# Patient Record
Sex: MALE | Race: WHITE | NOT HISPANIC OR LATINO | Employment: OTHER | URBAN - METROPOLITAN AREA
[De-identification: names, ages, dates, MRNs, and addresses within clinical notes are randomized per-mention and may not be internally consistent; named-entity substitution may affect disease eponyms.]

---

## 2022-07-24 PROBLEM — U07.1 COVID-19: Status: ACTIVE | Noted: 2022-07-24

## 2022-08-09 PROBLEM — C85.90 RECURRENT LYMPHOMA (HCC): Status: ACTIVE | Noted: 2022-08-09

## 2022-08-20 PROBLEM — I35.0 MILD AORTIC STENOSIS: Status: ACTIVE | Noted: 2017-07-26

## 2022-08-20 PROBLEM — D36.9 ADENOMATOUS POLYP: Status: ACTIVE | Noted: 2017-09-26

## 2022-08-20 PROBLEM — K58.0 IRRITABLE BOWEL SYNDROME WITH DIARRHEA: Status: ACTIVE | Noted: 2017-06-16

## 2022-08-20 PROBLEM — C82.00 FOLLICULAR LYMPHOMA GRADE I (HCC): Status: ACTIVE | Noted: 2022-06-08

## 2022-08-20 PROBLEM — C82.90 FOLLICULAR LYMPHOMA (HCC): Status: ACTIVE | Noted: 2018-12-14

## 2022-09-13 PROBLEM — N17.9 AKI (ACUTE KIDNEY INJURY) (HCC): Status: ACTIVE | Noted: 2022-09-13

## 2022-09-13 PROBLEM — J18.9 PNEUMONIA: Status: ACTIVE | Noted: 2022-09-13

## 2022-09-13 PROBLEM — R53.1 WEAKNESS: Status: ACTIVE | Noted: 2022-09-13

## 2022-09-15 PROBLEM — N17.9 AKI (ACUTE KIDNEY INJURY) (HCC): Status: RESOLVED | Noted: 2022-09-13 | Resolved: 2022-09-15

## 2022-09-15 PROBLEM — U07.1 LAB TEST POSITIVE FOR DETECTION OF COVID-19 VIRUS: Status: RESOLVED | Noted: 2022-07-24 | Resolved: 2022-09-15

## 2022-09-15 PROBLEM — J18.9 PNEUMONIA: Status: RESOLVED | Noted: 2022-09-13 | Resolved: 2022-09-15

## 2022-09-15 PROBLEM — R53.1 WEAKNESS: Status: RESOLVED | Noted: 2022-09-13 | Resolved: 2022-09-15

## 2022-09-21 ENCOUNTER — HOSPITAL ENCOUNTER (INPATIENT)
Facility: MEDICAL CENTER | Age: 72
LOS: 21 days | DRG: 177 | End: 2022-10-12
Attending: HOSPITALIST | Admitting: HOSPITALIST
Payer: MEDICARE

## 2022-09-21 ENCOUNTER — APPOINTMENT (OUTPATIENT)
Dept: RADIOLOGY | Facility: MEDICAL CENTER | Age: 72
DRG: 177 | End: 2022-09-21
Attending: STUDENT IN AN ORGANIZED HEALTH CARE EDUCATION/TRAINING PROGRAM
Payer: MEDICARE

## 2022-09-21 DIAGNOSIS — I47.10 SVT (SUPRAVENTRICULAR TACHYCARDIA) (HCC): ICD-10-CM

## 2022-09-21 DIAGNOSIS — U07.1 COVID: ICD-10-CM

## 2022-09-21 DIAGNOSIS — I35.0 AORTIC VALVE STENOSIS, ETIOLOGY OF CARDIAC VALVE DISEASE UNSPECIFIED: ICD-10-CM

## 2022-09-21 DIAGNOSIS — J96.01 ACUTE RESPIRATORY FAILURE WITH HYPOXIA (HCC): ICD-10-CM

## 2022-09-21 DIAGNOSIS — I50.20 HEART FAILURE WITH REDUCED EJECTION FRACTION (HCC): ICD-10-CM

## 2022-09-21 PROBLEM — D61.818 PANCYTOPENIA (HCC): Status: ACTIVE | Noted: 2022-09-21

## 2022-09-21 PROBLEM — R74.8 ELEVATED LIVER ENZYMES: Status: ACTIVE | Noted: 2022-09-21

## 2022-09-21 PROBLEM — I21.4 NSTEMI (NON-ST ELEVATED MYOCARDIAL INFARCTION) (HCC): Status: ACTIVE | Noted: 2022-09-21

## 2022-09-21 PROBLEM — N18.9 CKD (CHRONIC KIDNEY DISEASE): Status: ACTIVE | Noted: 2022-09-21

## 2022-09-21 PROBLEM — E87.8 LOW BICARBONATE: Status: ACTIVE | Noted: 2022-09-21

## 2022-09-21 PROBLEM — Z71.89 ADVANCE CARE PLANNING: Status: ACTIVE | Noted: 2022-09-21

## 2022-09-21 PROBLEM — E87.6 HYPOKALEMIA: Status: ACTIVE | Noted: 2022-09-21

## 2022-09-21 PROBLEM — R79.89 ELEVATED BRAIN NATRIURETIC PEPTIDE (BNP) LEVEL: Status: ACTIVE | Noted: 2022-09-21

## 2022-09-21 LAB
APTT PPP: 34.4 SEC (ref 24.7–36)
EKG IMPRESSION: NORMAL
INR PPP: 1.43 (ref 0.87–1.13)
LACTATE SERPL-SCNC: 1.2 MMOL/L (ref 0.5–2)
LACTATE SERPL-SCNC: 1.6 MMOL/L (ref 0.5–2)
PROCALCITONIN SERPL-MCNC: 0.43 NG/ML
PROTHROMBIN TIME: 17.2 SEC (ref 12–14.6)
TROPONIN T SERPL-MCNC: 579 NG/L (ref 6–19)
TROPONIN T SERPL-MCNC: 667 NG/L (ref 6–19)
UFH PPP CHRO-ACNC: <0.1 IU/ML

## 2022-09-21 PROCEDURE — 8E0ZXY6 ISOLATION: ICD-10-PCS | Performed by: STUDENT IN AN ORGANIZED HEALTH CARE EDUCATION/TRAINING PROGRAM

## 2022-09-21 PROCEDURE — 85730 THROMBOPLASTIN TIME PARTIAL: CPT

## 2022-09-21 PROCEDURE — 80074 ACUTE HEPATITIS PANEL: CPT

## 2022-09-21 PROCEDURE — 84484 ASSAY OF TROPONIN QUANT: CPT | Mod: 91

## 2022-09-21 PROCEDURE — 700102 HCHG RX REV CODE 250 W/ 637 OVERRIDE(OP): Performed by: STUDENT IN AN ORGANIZED HEALTH CARE EDUCATION/TRAINING PROGRAM

## 2022-09-21 PROCEDURE — 84145 PROCALCITONIN (PCT): CPT

## 2022-09-21 PROCEDURE — 93010 ELECTROCARDIOGRAM REPORT: CPT | Performed by: INTERNAL MEDICINE

## 2022-09-21 PROCEDURE — 83605 ASSAY OF LACTIC ACID: CPT | Mod: 91

## 2022-09-21 PROCEDURE — 99223 1ST HOSP IP/OBS HIGH 75: CPT | Mod: AI | Performed by: STUDENT IN AN ORGANIZED HEALTH CARE EDUCATION/TRAINING PROGRAM

## 2022-09-21 PROCEDURE — 85610 PROTHROMBIN TIME: CPT

## 2022-09-21 PROCEDURE — 93005 ELECTROCARDIOGRAM TRACING: CPT | Performed by: STUDENT IN AN ORGANIZED HEALTH CARE EDUCATION/TRAINING PROGRAM

## 2022-09-21 PROCEDURE — 770020 HCHG ROOM/CARE - TELE (206)

## 2022-09-21 PROCEDURE — A9270 NON-COVERED ITEM OR SERVICE: HCPCS | Performed by: STUDENT IN AN ORGANIZED HEALTH CARE EDUCATION/TRAINING PROGRAM

## 2022-09-21 PROCEDURE — 99223 1ST HOSP IP/OBS HIGH 75: CPT | Performed by: INTERNAL MEDICINE

## 2022-09-21 PROCEDURE — 85520 HEPARIN ASSAY: CPT

## 2022-09-21 RX ORDER — HEPARIN SODIUM 5000 [USP'U]/100ML
0-30 INJECTION, SOLUTION INTRAVENOUS CONTINUOUS
Status: DISCONTINUED | OUTPATIENT
Start: 2022-09-21 | End: 2022-09-21

## 2022-09-21 RX ORDER — ACETAMINOPHEN 325 MG/1
325 TABLET ORAL
COMMUNITY

## 2022-09-21 RX ORDER — ASPIRIN 300 MG/1
300 SUPPOSITORY RECTAL DAILY
Status: DISCONTINUED | OUTPATIENT
Start: 2022-09-22 | End: 2022-09-22

## 2022-09-21 RX ORDER — HEPARIN SODIUM 1000 [USP'U]/ML
80 INJECTION, SOLUTION INTRAVENOUS; SUBCUTANEOUS ONCE
Status: DISCONTINUED | OUTPATIENT
Start: 2022-09-21 | End: 2022-09-21

## 2022-09-21 RX ORDER — DEXAMETHASONE 6 MG/1
6 TABLET ORAL DAILY
Status: COMPLETED | OUTPATIENT
Start: 2022-09-22 | End: 2022-10-01

## 2022-09-21 RX ORDER — SODIUM BICARBONATE 650 MG/1
650 TABLET ORAL 2 TIMES DAILY
Status: COMPLETED | OUTPATIENT
Start: 2022-09-21 | End: 2022-09-23

## 2022-09-21 RX ORDER — HEPARIN SODIUM 1000 [USP'U]/ML
2000 INJECTION, SOLUTION INTRAVENOUS; SUBCUTANEOUS PRN
Status: DISCONTINUED | OUTPATIENT
Start: 2022-09-21 | End: 2022-09-21

## 2022-09-21 RX ORDER — HEPARIN SODIUM 1000 [USP'U]/ML
4000 INJECTION, SOLUTION INTRAVENOUS; SUBCUTANEOUS ONCE
Status: DISCONTINUED | OUTPATIENT
Start: 2022-09-21 | End: 2022-09-21

## 2022-09-21 RX ORDER — ASPIRIN 325 MG
325 TABLET ORAL DAILY
Status: DISCONTINUED | OUTPATIENT
Start: 2022-09-22 | End: 2022-09-22

## 2022-09-21 RX ORDER — DOXYCYCLINE 100 MG/1
100 TABLET ORAL EVERY 12 HOURS
Status: COMPLETED | OUTPATIENT
Start: 2022-09-21 | End: 2022-09-28

## 2022-09-21 RX ORDER — ASPIRIN 81 MG/1
324 TABLET, CHEWABLE ORAL DAILY
Status: DISCONTINUED | OUTPATIENT
Start: 2022-09-22 | End: 2022-09-22

## 2022-09-21 RX ORDER — HEPARIN SODIUM 1000 [USP'U]/ML
40 INJECTION, SOLUTION INTRAVENOUS; SUBCUTANEOUS PRN
Status: DISCONTINUED | OUTPATIENT
Start: 2022-09-21 | End: 2022-09-21

## 2022-09-21 RX ADMIN — DOXYCYCLINE 100 MG: 100 TABLET, FILM COATED ORAL at 18:39

## 2022-09-21 RX ADMIN — SODIUM BICARBONATE 650 MG: 650 TABLET ORAL at 18:41

## 2022-09-21 ASSESSMENT — COGNITIVE AND FUNCTIONAL STATUS - GENERAL
SUGGESTED CMS G CODE MODIFIER DAILY ACTIVITY: CH
DAILY ACTIVITIY SCORE: 24
SUGGESTED CMS G CODE MODIFIER MOBILITY: CH
MOBILITY SCORE: 24

## 2022-09-21 ASSESSMENT — FIBROSIS 4 INDEX: FIB4 SCORE: 18.77

## 2022-09-21 ASSESSMENT — ENCOUNTER SYMPTOMS
VOMITING: 0
NAUSEA: 0
COUGH: 1
BLURRED VISION: 0
SHORTNESS OF BREATH: 0
DIZZINESS: 0
MYALGIAS: 0
FEVER: 0
BRUISES/BLEEDS EASILY: 0

## 2022-09-21 ASSESSMENT — LIFESTYLE VARIABLES
HOW MANY TIMES IN THE PAST YEAR HAVE YOU HAD 5 OR MORE DRINKS IN A DAY: 0
HAVE PEOPLE ANNOYED YOU BY CRITICIZING YOUR DRINKING: NO
TOTAL SCORE: 0
EVER FELT BAD OR GUILTY ABOUT YOUR DRINKING: NO
TOTAL SCORE: 0
ON A TYPICAL DAY WHEN YOU DRINK ALCOHOL HOW MANY DRINKS DO YOU HAVE: 0
HAVE YOU EVER FELT YOU SHOULD CUT DOWN ON YOUR DRINKING: NO
TOTAL SCORE: 0
DOES PATIENT WANT TO STOP DRINKING: NO
AVERAGE NUMBER OF DAYS PER WEEK YOU HAVE A DRINK CONTAINING ALCOHOL: 0
EVER HAD A DRINK FIRST THING IN THE MORNING TO STEADY YOUR NERVES TO GET RID OF A HANGOVER: NO
ALCOHOL_USE: NO
CONSUMPTION TOTAL: NEGATIVE

## 2022-09-21 ASSESSMENT — PATIENT HEALTH QUESTIONNAIRE - PHQ9
1. LITTLE INTEREST OR PLEASURE IN DOING THINGS: NOT AT ALL
2. FEELING DOWN, DEPRESSED, IRRITABLE, OR HOPELESS: NOT AT ALL
SUM OF ALL RESPONSES TO PHQ9 QUESTIONS 1 AND 2: 0

## 2022-09-21 NOTE — PROGRESS NOTES
[facility] TRIAGE OFFICER DIRECT ADMISSION REPORT  Transferring facility: [place] [facility]    Chief complaint: weakness, anorexia. NO chest pain  Pertinent history & patient course: covid 19 dx one week ago  Pertinent imaging & lab results: elevated troponin in setting of covid 19    New t wave abnormalities inferior leads    Patient accepted for transfer: Yes    Admission status: Inpatient.   Floor requested: tele      Please inform the triage officer upon arrival of the patient to [facility] for assignment of a hospitalist to perform admission.     For any question or concerns regarding the care of this patient, please reach out to the assigned hospitalist.

## 2022-09-22 ENCOUNTER — APPOINTMENT (OUTPATIENT)
Dept: CARDIOLOGY | Facility: MEDICAL CENTER | Age: 72
DRG: 177 | End: 2022-09-22
Attending: PHYSICIAN ASSISTANT
Payer: MEDICARE

## 2022-09-22 ENCOUNTER — APPOINTMENT (OUTPATIENT)
Dept: RADIOLOGY | Facility: MEDICAL CENTER | Age: 72
DRG: 177 | End: 2022-09-22
Attending: STUDENT IN AN ORGANIZED HEALTH CARE EDUCATION/TRAINING PROGRAM
Payer: MEDICARE

## 2022-09-22 LAB
ANION GAP SERPL CALC-SCNC: 16 MMOL/L (ref 7–16)
BUN SERPL-MCNC: 43 MG/DL (ref 8–22)
CALCIUM SERPL-MCNC: 7.6 MG/DL (ref 8.5–10.5)
CHLORIDE SERPL-SCNC: 103 MMOL/L (ref 96–112)
CO2 SERPL-SCNC: 15 MMOL/L (ref 20–33)
CREAT SERPL-MCNC: 2.1 MG/DL (ref 0.5–1.4)
EKG IMPRESSION: NORMAL
ERYTHROCYTE [DISTWIDTH] IN BLOOD BY AUTOMATED COUNT: 47.1 FL (ref 35.9–50)
GFR SERPLBLD CREATININE-BSD FMLA CKD-EPI: 33 ML/MIN/1.73 M 2
GLUCOSE SERPL-MCNC: 166 MG/DL (ref 65–99)
HAV IGM SERPL QL IA: NORMAL
HBV CORE IGM SER QL: NORMAL
HBV SURFACE AG SER QL: NORMAL
HCT VFR BLD AUTO: 26.4 % (ref 42–52)
HCV AB SER QL: NORMAL
HGB BLD-MCNC: 9.5 G/DL (ref 14–18)
LACTATE SERPL-SCNC: 1.1 MMOL/L (ref 0.5–2)
LV EJECT FRACT  99904: 20
MCH RBC QN AUTO: 32.1 PG (ref 27–33)
MCHC RBC AUTO-ENTMCNC: 36 G/DL (ref 33.7–35.3)
MCV RBC AUTO: 89.2 FL (ref 81.4–97.8)
PLATELET # BLD AUTO: 93 K/UL (ref 164–446)
PMV BLD AUTO: 11.5 FL (ref 9–12.9)
POTASSIUM SERPL-SCNC: 3.7 MMOL/L (ref 3.6–5.5)
RBC # BLD AUTO: 2.96 M/UL (ref 4.7–6.1)
SODIUM SERPL-SCNC: 134 MMOL/L (ref 135–145)
WBC # BLD AUTO: 2.1 K/UL (ref 4.8–10.8)

## 2022-09-22 PROCEDURE — 700102 HCHG RX REV CODE 250 W/ 637 OVERRIDE(OP): Performed by: STUDENT IN AN ORGANIZED HEALTH CARE EDUCATION/TRAINING PROGRAM

## 2022-09-22 PROCEDURE — 770020 HCHG ROOM/CARE - TELE (206)

## 2022-09-22 PROCEDURE — 87040 BLOOD CULTURE FOR BACTERIA: CPT

## 2022-09-22 PROCEDURE — 36415 COLL VENOUS BLD VENIPUNCTURE: CPT

## 2022-09-22 PROCEDURE — 700102 HCHG RX REV CODE 250 W/ 637 OVERRIDE(OP): Performed by: INTERNAL MEDICINE

## 2022-09-22 PROCEDURE — 700111 HCHG RX REV CODE 636 W/ 250 OVERRIDE (IP): Performed by: INTERNAL MEDICINE

## 2022-09-22 PROCEDURE — 93306 TTE W/DOPPLER COMPLETE: CPT

## 2022-09-22 PROCEDURE — 93306 TTE W/DOPPLER COMPLETE: CPT | Mod: 26 | Performed by: INTERNAL MEDICINE

## 2022-09-22 PROCEDURE — A9270 NON-COVERED ITEM OR SERVICE: HCPCS | Performed by: INTERNAL MEDICINE

## 2022-09-22 PROCEDURE — 99232 SBSQ HOSP IP/OBS MODERATE 35: CPT | Performed by: INTERNAL MEDICINE

## 2022-09-22 PROCEDURE — 83605 ASSAY OF LACTIC ACID: CPT

## 2022-09-22 PROCEDURE — 85027 COMPLETE CBC AUTOMATED: CPT

## 2022-09-22 PROCEDURE — 76705 ECHO EXAM OF ABDOMEN: CPT

## 2022-09-22 PROCEDURE — 93010 ELECTROCARDIOGRAM REPORT: CPT | Performed by: INTERNAL MEDICINE

## 2022-09-22 PROCEDURE — 80048 BASIC METABOLIC PNL TOTAL CA: CPT

## 2022-09-22 PROCEDURE — 71045 X-RAY EXAM CHEST 1 VIEW: CPT

## 2022-09-22 PROCEDURE — A9270 NON-COVERED ITEM OR SERVICE: HCPCS | Performed by: STUDENT IN AN ORGANIZED HEALTH CARE EDUCATION/TRAINING PROGRAM

## 2022-09-22 RX ORDER — HEPARIN SODIUM 5000 [USP'U]/ML
5000 INJECTION, SOLUTION INTRAVENOUS; SUBCUTANEOUS EVERY 8 HOURS
Status: DISCONTINUED | OUTPATIENT
Start: 2022-09-22 | End: 2022-10-02

## 2022-09-22 RX ORDER — FUROSEMIDE 10 MG/ML
40 INJECTION INTRAMUSCULAR; INTRAVENOUS
Status: DISCONTINUED | OUTPATIENT
Start: 2022-09-22 | End: 2022-09-23

## 2022-09-22 RX ORDER — POTASSIUM CHLORIDE 20 MEQ/1
40 TABLET, EXTENDED RELEASE ORAL DAILY
Status: DISCONTINUED | OUTPATIENT
Start: 2022-09-23 | End: 2022-09-22

## 2022-09-22 RX ORDER — POTASSIUM CHLORIDE 20 MEQ/1
40 TABLET, EXTENDED RELEASE ORAL DAILY
Status: COMPLETED | OUTPATIENT
Start: 2022-09-22 | End: 2022-09-23

## 2022-09-22 RX ORDER — ENOXAPARIN SODIUM 100 MG/ML
30 INJECTION SUBCUTANEOUS DAILY
Status: DISCONTINUED | OUTPATIENT
Start: 2022-09-22 | End: 2022-09-22

## 2022-09-22 RX ADMIN — DOXYCYCLINE 100 MG: 100 TABLET, FILM COATED ORAL at 18:36

## 2022-09-22 RX ADMIN — FUROSEMIDE 40 MG: 10 INJECTION, SOLUTION INTRAMUSCULAR; INTRAVENOUS at 13:51

## 2022-09-22 RX ADMIN — DEXAMETHASONE 6 MG: 4 TABLET ORAL at 05:06

## 2022-09-22 RX ADMIN — HEPARIN SODIUM 5000 UNITS: 5000 INJECTION INTRAVENOUS; SUBCUTANEOUS at 13:51

## 2022-09-22 RX ADMIN — DOXYCYCLINE 100 MG: 100 TABLET, FILM COATED ORAL at 05:05

## 2022-09-22 RX ADMIN — SODIUM BICARBONATE 650 MG: 650 TABLET ORAL at 05:05

## 2022-09-22 RX ADMIN — POTASSIUM CHLORIDE 40 MEQ: 1500 TABLET, EXTENDED RELEASE ORAL at 13:51

## 2022-09-22 RX ADMIN — ASPIRIN 81 MG 324 MG: 81 TABLET ORAL at 05:05

## 2022-09-22 RX ADMIN — HEPARIN SODIUM 5000 UNITS: 5000 INJECTION INTRAVENOUS; SUBCUTANEOUS at 22:38

## 2022-09-22 RX ADMIN — SODIUM BICARBONATE 650 MG: 650 TABLET ORAL at 18:36

## 2022-09-22 ASSESSMENT — FIBROSIS 4 INDEX: FIB4 SCORE: 20.18

## 2022-09-22 NOTE — ASSESSMENT & PLAN NOTE
Echo showing ef of 20% probable severer Aortic stenosis  Cardiology evaluated him and recommended to reconsult for possible stress echo when he is more stable.  No symptoms of acute chest pain.

## 2022-09-22 NOTE — ASSESSMENT & PLAN NOTE
Renal function has been fluctuating.  Increased BUN and creatinine this morning.  Continue to monitor  Renally dose medications as appropriate

## 2022-09-22 NOTE — PROGRESS NOTES
Interval History:  Feels better than yesterday.  Shortness of breath is improving.  No chest pain.    Physical Exam   Blood pressure (!) 99/53, pulse 75, temperature 36.2 °C (97.1 °F), temperature source Temporal, resp. rate 18, weight 71 kg (156 lb 8.4 oz), SpO2 95 %.    Constitutional:  Appears well-developed.   HENT: Normocephalic and atraumatic. No scleral icterus.   Neck: No JVD present.   Cardiovascular: 2 x 6 systolic murmur aortic area   pulmonary/Chest: Basal crackles  Abdominal: S/NT/ND BS+   Musculoskeletal: Exhibits no edema. Pulses present.  Skin: Skin is warm and dry.     ROS: As HPI other reviewed and negative       Intake/Output Summary (Last 24 hours) at 9/22/2022 1334  Last data filed at 9/22/2022 0950  Gross per 24 hour   Intake 120 ml   Output --   Net 120 ml       Recent Labs     09/21/22  1130   WBC 2.5*   RBC 3.15*   HEMOGLOBIN 10.0*   HEMATOCRIT 28.6*   MCV 90.8   MCH 31.7   MCHC 35.0   RDW 14.6*   PLATELETCT 100*   MPV 10.6*     Recent Labs     09/21/22  1130   SODIUM 131*   POTASSIUM 3.3*   CHLORIDE 105   CO2 16*   GLUCOSE 94   BUN 32*   CREATININE 2.2*   CALCIUM 7.4*     Recent Labs     09/21/22  1830   APTT 34.4   INR 1.43*     Recent Labs     09/21/22  1130   BNPBTYPENAT 33067*     Recent Labs     09/21/22  1130 09/21/22  1304   TROPONINI 6.68* 5.49*   BNPBTYPENAT 95708*  --            No current facility-administered medications on file prior to encounter.     Current Outpatient Medications on File Prior to Encounter   Medication Sig Dispense Refill    acetaminophen (TYLENOL) 325 MG Tab Take 325 mg by mouth one time as needed.      Cyanocobalamin 2000 MCG Tab Take 1 Tablet by mouth every day.      acyclovir (ZOVIRAX) 400 MG tablet Take 400 mg by mouth every day.      allopurinol (ZYLOPRIM) 100 MG Tab Take 100 mg by mouth every day.      aspirin 81 MG EC tablet Take 81 mg by mouth every day.      vitamin D (VITAMIND D3) 1000 UNIT Tab Take 2,000 Units by mouth every day.      rosuvastatin  (CRESTOR) 20 MG Tab Take 20 mg by mouth every day.         Current Facility-Administered Medications   Medication Dose Frequency Provider Last Rate Last Admin    furosemide (LASIX) injection 40 mg  40 mg BID DIURETIC Vic Escobar M.D.        potassium chloride SA (Kdur) tablet 40 mEq  40 mEq DAILY Jennifer Villarreal M.D.        [START ON 9/23/2022] aspirin EC (ECOTRIN) tablet 81 mg  81 mg DAILY Jennifer Villarreal M.D.        heparin injection 5,000 Units  5,000 Units Q8HRS Jennifer Villarreal M.D.        sodium bicarbonate tablet 650 mg  650 mg BID Judah Chen M.D.   650 mg at 09/22/22 0505    dexamethasone (DECADRON) tablet 6 mg  6 mg DAILY Judah Chen M.D.   6 mg at 09/22/22 0506    doxycycline monohydrate (ADOXA) tablet 100 mg  100 mg Q12HRS Judah Chen M.D.   100 mg at 09/22/22 0505   Last reviewed on 9/21/2022  5:36 PM by Elle Bah   Medications reviewed    Imaging reviewed    Chest x-ray multifocal pneumonia versus pulmonary edema    Impressions:  72-year-old male patient with COVID pneumonia,?  Myocarditis with congestive heart failure    Recommendations:  Awaiting echocardiogram.   Not sure how much is heart failure versus multifocal pneumonia due to COVID.  Trial of diuretics today.  We will continue to follow.    Discussed with primary physician and bedside nursing.    Do not hesitate to call me with questions regarding the patient care.    Vic Escobar  Interventional cardiologist  Cell: [phone]

## 2022-09-22 NOTE — ASSESSMENT & PLAN NOTE
Likely in setting of COVID-19 infection  Euvolemic on exam  Echo showed reduced ejection fraction.

## 2022-09-22 NOTE — DIETARY
Nutrition services: Day 1 of admit.  Ceferino Fitch is a 72 y.o. male with admitting DX of elevated troponin, NSTEMI.    Pt seen for malnutrition screening tool score of 3, pt noted to have unsure wt loss with poor PO intake. RD unable to see pt at bedside due to COVID 19 isolation precautions. RD attempted to reach pt by bedside hospital phone and pt's cell phone, unable to reach pt at this time.       Assessment:  Weight: 71 kg (156 lb 8.4 oz)- per bedscale on 9/21.   Body mass index is 21.83 kg/m²., BMI classification: normal.   Diet/Intake: Cardiac, No decaf, no caffeine. Per ADL, pt ate ~ 25-50% of Breakfast this am.     Evaluation:   Pertinent Labs: Na+ 131, K+ 3.3, BUN 32, Creat 2.2, / .   Pertinent Meds: Decadron, Lasix, Kdur, Sodium Bicarbonate.   No pressure ulcers noted.   Per chart, pt with COVID, CKD and hx of follicular lymphoma grade I.   Per MD notes, there is question pt may have heart failure, getting diuresis. Wt trends will be difficult to assess due to fluid imbalances.   Wt on 9/4/2022 was 74.4 kg/ 164 lbs, 71 kg/156 lbs on 9/13/2022.   ON 7/21: pt with 1+ pitting edema to bilateral lower extremities.     Malnutrition Risk: unable to meet APSPEN criteria, limited information and RD unable to reach pt for interview.      Recommendations/Plan:  Will add Boost Plus with meals to help optimize PO intake.   Encourage PO and supplement intake and document as % taken in ADL's to provide interdisciplinary communication across all shifts.   Monitor weight.  Nutrition rep will continue to see patient for ongoing meal and snack preferences.     RD to monitor for adequate intake and follow for interview as feasible.

## 2022-09-22 NOTE — ASSESSMENT & PLAN NOTE
In setting of immunotherapy  White blood cell count has been fluctuating.  Continue to monitor  Platelet count hovering around 50 and has had epistaxis so off chemical DVT prophylaxis at present

## 2022-09-22 NOTE — ASSESSMENT & PLAN NOTE
Hepatitis panel came back negative.  right upper quadrant ultrasound did not show any acute abnormalities.  Could be secondary to remdesivir or congestive etiology  AST/ALT now in normal range

## 2022-09-22 NOTE — CONSULTS
crDate & Time note created:    9/21/2022   8:29 PM     Referring MD:  Dr. Chen    Patient ID:   Name:             Ceferino Fitch     YOB: 1950  Age:                 72 y.o.  male   MRN:               [id number]                                                             Reason for Consult:      Pos trop    History of Present Illness:    72-year-old male with a past medical history of chronic kidney disease with baseline creatinine about 1.5.  He was admitted to the hospital last week with acute on chronic CKD in the setting of COVID-pneumonia.  He was complaining of weakness and discharged.  He also has known mild aortic stenosis.  He had an echocardiogram completed 11/13/2019 which was normal.  He has not had 1 since then in our system.    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  He was transferred from the outside facility for an elevated troponin with T wave inversions.  He said he is been complaining mostly of fatigue and weakness but no chest pain pressure or shortness of breath.  His wife told him to come in for evaluation which is what led to the discovery of his high troponin.  His troponin at the outside hospital was 6.6 which reduced to 5.  His sodium was 130 with a potassium of 3.3.  His NT proBNP was elevated at 19,400.    Review of Systems:      Constitutional: Denies fevers, Denies weight changes  Eyes: Denies changes in vision, no eye pain  Ears/Nose/Throat/Mouth: Denies nasal congestion or sore throat   Cardiovascular: no chest pain, no palpitations   Respiratory: no shortness of breath , Denies cough  Gastrointestinal/Hepatic: Denies abdominal pain, nausea, vomiting, diarrhea, constipation or GI bleeding   Genitourinary: Denies dysuria or frequency  Musculoskeletal/Rheum: Denies  joint pain and swelling, no edema  Skin: Denies rash  Neurological: Denies headache, confusion, memory loss or focal weakness/parasthesias  Psychiatric: denies mood disorder  "  Endocrine: Emma thyroid problems  Heme/Oncology/Lymph Nodes: Denies enlarged lymph nodes, denies brusing or known bleeding disorder  All other systems were reviewed and are negative (AMA/CMS criteria)                Past Medical History:   Past Medical History:   Diagnosis Date    Cancer (HCC)     Follicular lymphoma (HCC)     Hypertension      Active Hospital Problems    Diagnosis     NSTEMI (non-ST elevated myocardial infarction) (HCC) [I21.4]     Acute respiratory failure with hypoxia (HCC) [J96.01]     COVID [U07.1]     Low bicarbonate [E87.8]     Hypokalemia [E87.6]     Pancytopenia (HCC) [D61.818]     CKD (chronic kidney disease) [N18.9]     Elevated brain natriuretic peptide (BNP) level [R79.89]     Elevated liver enzymes [R74.8]     Advance care planning [Z71.89]     Follicular lymphoma grade I (HCC) [C82.00]     Essential hypertension, benign [I10]        Past Surgical History:  No past surgical history on file.    Hospital Medications:  Current Facility-Administered Medications   Medication Dose    [START ON 9/22/2022] aspirin (ASA) tablet 325 mg  325 mg    Or    [START ON 9/22/2022] aspirin (ASA) chewable tab 324 mg  324 mg    Or    [START ON 9/22/2022] aspirin (ASA) suppository 300 mg  300 mg    sodium bicarbonate tablet 650 mg  650 mg    [START ON 9/22/2022] dexamethasone (DECADRON) tablet 6 mg  6 mg    doxycycline monohydrate (ADOXA) tablet 100 mg  100 mg    heparin infusion 25,000 units in 500 mL 0.45% NACL  0-30 Units/kg/hr    heparin injection 4,000 Units  4,000 Units    heparin injection 2,000 Units  2,000 Units         Current Outpatient Medications:  Medications Prior to Admission   Medication Sig Dispense Refill Last Dose    acetaminophen (TYLENOL) 325 MG Tab Take 325 mg by mouth one time as needed.   \"Few days ago\" at unk    Cyanocobalamin 2000 MCG Tab Take 1 Tablet by mouth every day.   9/20/2022 at 1600    acyclovir (ZOVIRAX) 400 MG tablet Take 400 mg by mouth every day.   9/21/2022 at " 0930    allopurinol (ZYLOPRIM) 100 MG Tab Take 100 mg by mouth every day.   9/21/2022 at 0930    aspirin 81 MG EC tablet Take 81 mg by mouth every day.   9/21/2022 at 0930    vitamin D (VITAMIND D3) 1000 UNIT Tab Take 2,000 Units by mouth every day.   9/20/2022 at 1600    rosuvastatin (CRESTOR) 20 MG Tab Take 20 mg by mouth every day.   9/20/2022 at 1600       Medication Allergy:  Allergies   Allergen Reactions    Penicillins Rash and Unspecified     Hoarseness         Family History:  No family history on file.    Social History:  Social History     Socioeconomic History    Marital status:      Spouse name: Not on file    Number of children: Not on file    Years of education: Not on file    Highest education level: Not on file   Occupational History    Not on file   Tobacco Use    Smoking status: Former     Passive exposure: Past    Smokeless tobacco: Never   Vaping Use    Vaping Use: Not on file   Substance and Sexual Activity    Alcohol use: Not Currently     Alcohol/week: 3.0 oz     Types: 5 Cans of beer per week    Drug use: Never    Sexual activity: Not on file   Other Topics Concern    Not on file   Social History Narrative    Not on file     Social Determinants of Health     Financial Resource Strain: Not on file   Food Insecurity: Not on file   Transportation Needs: Not on file   Physical Activity: Not on file   Stress: Not on file   Social Connections: Not on file   Intimate Partner Violence: Not on file   Housing Stability: Not on file         Physical Exam:  Vitals/ General Appearance:   Weight/BMI: Body mass index is 21.83 kg/m².  /47   Pulse 75   Temp (!) 35.7 °C (96.3 °F) (Tympanic)   Resp 17   Wt 71 kg (156 lb 8.4 oz)   SpO2 92%   Vitals:    09/21/22 1700 09/21/22 1939 09/21/22 1946 09/21/22 1958   BP: (!) 98/62 113/61  100/47   Pulse: 95 74  75   Resp: (!) 22 18  17   Temp: 36.5 °C (97.7 °F) (!) 35.7 °C (96.3 °F)     TempSrc: Temporal Tympanic     SpO2: 95% 92% 96% 92%   Weight:     71 kg (156 lb 8.4 oz)     Oxygen Therapy:  Pulse Oximetry: 92 %, O2 (LPM): 2, O2 Delivery Device: None - Room Air    Constitutional:   Well developed, Well nourished, No acute distress  HENMT:  Normocephalic, Atraumatic, Oropharynx moist mucous membranes, No oral exudates, Nose normal.  No thyromegaly.  Eyes:  EOMI, Conjunctiva normal, No discharge.  Neck:  Normal range of motion, No cervical tenderness,  no JVD.  Cardiovascular:  Normal heart rate, Normal rhythm, No murmurs, No rubs, No gallops.   Extremitites with intact distal pulses, no cyanosis, or edema.  Lungs:  Normal breath sounds, breath sounds clear to auscultation bilaterally,  no crackles, no wheezing.   Abdomen: Bowel sounds normal, Soft, No tenderness, No guarding, No rebound, No masses, No hepatosplenomegaly.  Skin: Warm, Dry, No erythema, No rash, no induration.  Neurologic: Alert & oriented x 3, No focal deficits noted, cranial nerves II through X are grossly intact.  Psychiatric: Affect normal, Judgment normal, Mood normal.      MDM (Data Review):     Records reviewed and summarized in current documentation    Lab Data Review:  Recent Results (from the past 24 hour(s))   CBC WITH DIFFERENTIAL    Collection Time: 09/21/22 11:30 AM   Result Value Ref Range    WBC 2.5 (L) 4.8 - 10.8 K/uL    RBC 3.15 (L) 4.70 - 6.10 M/uL    Hemoglobin 10.0 (L) 14.0 - 18.0 g/dL    Hematocrit 28.6 (L) 42.0 - 52.0 %    MCV 90.8 80.0 - 94.0 fL    MCH 31.7 28.7 - 33.1 pg    MCHC 35.0 33.0 - 37.0 g/dL    RDW 14.6 (H) 11.5 - 14.5 %    Platelet Count 100 (L) 130 - 400 K/uL    MPV 10.6 (H) 7.4 - 10.4 fL    Neutrophils Automated 91.6 (H) 39.0 - 70.0 %    Lymphocytes Automated 0.8 (L) 21.0 - 50.0 %    Monocytes Automated 7.6 1.7 - 10.0 %    Eosinophils Automated 0.0 0.0 - 5.0 %    Basophils Automated 0.0 0.0 - 3.0 %    Abs Neutrophils Automated 2.3 1.8 - 7.7 K/uL    Abs Lymph Automated 0.0 (L) 1.2 - 4.8 K/uL    Monos (Absolute) 0.2 0.2 - 0.9 K/uL   COMP METABOLIC PANEL     Collection Time: 09/21/22 11:30 AM   Result Value Ref Range    Sodium 131 (L) 137 - 145 mmol/L    Potassium 3.3 (L) 3.5 - 5.1 mmol/L    Chloride 105 98 - 107 mmol/L    Co2 16 (L) 22 - 30 mmol/L    Anion Gap 10 4 - 12 mmol/L    Glucose 94 70 - 100 mg/dL    Bun 32 (H) 9 - 20 mg/dL    Creatinine 2.2 (H) 0.7 - 1.3 mg/dL    Calcium 7.4 (L) 8.7 - 10.5 mg/dL    AST(SGOT) 284 (H) 15 - 46 U/L    ALT(SGPT) 122 (H) 13 - 69 U/L    Alkaline Phosphatase 84 38 - 126 U/L    Total Bilirubin 0.7 0.2 - 1.3 mg/dL    Albumin 2.7 (L) 3.5 - 5.0 g/dL    Total Protein 5.0 (L) 6.3 - 8.2 g/dL    A-G Ratio 1.1    TROPONIN    Collection Time: 09/21/22 11:30 AM   Result Value Ref Range    Troponin I 6.68 (HH) 0.00 - 0.04 ng/mL   LACTIC ACID    Collection Time: 09/21/22 11:30 AM   Result Value Ref Range    Lactic Acid 1.5 0.0 - 2.0 mmol/L   VENOUS BLOOD GAS    Collection Time: 09/21/22 11:30 AM   Result Value Ref Range    Venous Bg Ph 7.42 7.35 - 7.45    Venous Bg Pco2 28.1 (L) 36.0 - 48.0 mmHg    Venous Bg Po2 56.4 mmHg    Venous Bg Hco3 17.7 (L) 24.0 - 28.0 mmol/L    Venous Bg Base Excess -6.0 (L) -3.0 - 3.0 mmol/L   ESTIMATED GFR    Collection Time: 09/21/22 11:30 AM   Result Value Ref Range    GFR (CKD-EPI) 31 (A) >60 mL/min/1.73 m 2   BTYPE NATRIURETIC PEPTIDE    Collection Time: 09/21/22 11:30 AM   Result Value Ref Range    B Natriuretic Peptide 38496 (H) 0 - 125 pg/mL   RESPIRATORY PANEL BY PCR    Collection Time: 09/21/22 11:38 AM   Result Value Ref Range    Adenovirus, PCR Not Detected Not Detected    Coronavirus 229E, PCR Not Detected Not Detected    Coronavirus HKU1, PCR Not Detected Not Detected    Coronavirus NL63, PCR Not Detected Not Detected    Coronavirus OC43, PCR Not Detected Not Detected    SARS-CoV-2 (COVID-19) RNA by ROHIT Detected (A) Not Detected    Influenza virus A RNA Not Detected Not Detected    Influenza virus A H1 RNA Not Detected Not Detected    Influenza virus A H3 RNA Not Detected Not Detected    Influenza A 2009,  H1N1, PCR Not Detected Not Detected    Influenza virus B, PCR Not Detected Not Detected    Human Metapneumovirus, PCR Not Detected Not Detected    Rhinovirus / Enterovirus, PCR Not Detected Not Detected    Parainfluenza virus 1, PCR Not Detected Not Detected    Parainfluenza virus 2, PCR Not Detected Not Detected    Parainfluenza virus 3, PCR Not Detected Not Detected    Parainfluenza 4, PCR Not Detected Not Detected    B. pertussis, PCR Not Detected Not Detected    B. parapertussis, PCR Not Detected Not Detected    Chlamydia pneumoniae, PCR Not Detected Not Detected    Mycoplasma pneumoniae, PCR Not Detected Not Detected   URINALYSIS CULTURE, IF INDICATED    Collection Time: 22 11:38 AM    Specimen: Urine   Result Value Ref Range    Color YELLOW     Character CLEAR     Specific Gravity 1.020 <1.035    Ph 6.0 5.0 - 8.0    Glucose NEGATIVE Negative mg/dL    Ketones TRACE (A) Negative mg/dL    Protein 100 (A) Negative mg/dL    Bilirubin NEGATIVE Negative    Urobilinogen, Urine 0.2 Negative    Nitrite NEGATIVE Negative    Leukocyte Esterase NEGATIVE Negative    Occult Blood MODERATE (A) Negative    Culture Indicated Yes UA Culture   URINE MICROSCOPIC (W/UA)    Collection Time: 22 11:38 AM   Result Value Ref Range    WBC 3-5 0 - 6 /hpf    RBC Rare 0 - 3 /hpf    Bacteria 6-25 (A) None /hpf    Epithelial Cells None Seen /hpf    Amorphous Crystal 1+ /hpf    Urine Other See Below    TROPONIN    Collection Time: 22  1:04 PM   Result Value Ref Range    Troponin I 5.49 (HH) 0.00 - 0.04 ng/mL   EKG - STAT Once    Collection Time: 22  5:46 PM   Result Value Ref Range    Report       [facility]    Test Date:  2022  Pt Name:    SUSSY HAYNESWATER              Department: 171  MRN:        [id number]                      Room:       T728  Gender:     Male                         Technician: AD  :        1950                   Requested By:NIDA SILVER  Order #:    [id number]                     Reading MD: Joselito Michel MD    Measurements  Intervals                                Axis  Rate:       72                           P:          47  OK:         151                          QRS:        -28  QRSD:       102                          T:          -32  QT:         465  QTc:        509    Interpretive Statements  Sinus rhythm  Borderline left axis deviation  Nonspecific T abnormalities, diffuse leads  Prolonged QT interval  No previous ECG available for comparison  Electronically Signed On 9- 20:07:40 PDT by Joselito Michel MD     Troponin - STAT Once    Collection Time: 09/21/22  6:30 PM   Result Value Ref Range    Troponin T 667 (H) 6 - 19 ng/L   LACTIC ACID    Collection Time: 09/21/22  6:30 PM   Result Value Ref Range    Lactic Acid 1.6 0.5 - 2.0 mmol/L   aPTT    Collection Time: 09/21/22  6:30 PM   Result Value Ref Range    APTT 34.4 24.7 - 36.0 sec   Prothrombin Time    Collection Time: 09/21/22  6:30 PM   Result Value Ref Range    PT 17.2 (H) 12.0 - 14.6 sec    INR 1.43 (H) 0.87 - 1.13   Heparin Xa (Unfractionated)    Collection Time: 09/21/22  6:30 PM   Result Value Ref Range    Heparin Xa (UFH) <0.10 IU/mL   PROCALCITONIN    Collection Time: 09/21/22  6:30 PM   Result Value Ref Range    Procalcitonin 0.43 (H) <0.25 ng/mL       Imaging/Procedures Review:    Chest Xray:  Reviewed    EKG:   Scanned to media and personally viewed inter by myself dated 9/21/2022 personally viewed and interpreted myself showing normal sinus rhythm otherwise normal ECG.    ECHO:  Per HPI  MDM (Assessment and Plan):     Active Hospital Problems    Diagnosis     NSTEMI (non-ST elevated myocardial infarction) (HCC) [I21.4]     Acute respiratory failure with hypoxia ([facility]) [J96.01]     COVID [U07.1]     Low bicarbonate [E87.8]     Hypokalemia [E87.6]     Pancytopenia (HCC) [D61.818]     CKD (chronic kidney disease) [N18.9]     Elevated brain natriuretic peptide (BNP) level [R79.89]     Elevated liver enzymes  [R74.8]     Advance care planning [Z71.89]     Follicular lymphoma grade I (HCC) [C82.00]     Essential hypertension, benign [I10]      72-year-old male with a positive troponin in the setting of possibly a COVID myopericarditis versus new onset heart failure of unknown etiology.  We will get an echocardiogram.  I would not treat this is ACS as of yet.  Please get the echocardiogram urgently in the morning.  He is not volume overloaded on exam so I will start a diuretic.

## 2022-09-22 NOTE — ASSESSMENT & PLAN NOTE
Activate sepsis protocol  COVID Isolation  He was placed on oxygen nasal cannula but he developed hypoxia again and he was placed on high flow nasal cannula.  Completed Decadron last dose October 1, 2022  He completed course of remdesivir.  Repeat COVID testing ordered by pulmonologist came back positive.  Monitor oxygen saturation closely

## 2022-09-22 NOTE — ASSESSMENT & PLAN NOTE
Respiratory failure in setting of covid -19 vs ? HF   Incentive spirometry  Currently he is on high flow oxygen to maintain oxygen saturation.   Pulm following  Has been treated for COVID with decadron and Remdesivir  ?secondary infectious process: ID consulted and following  Completed 7 days of Pip/Tazo per ID recommendations  Acyclovir is for prophylaxis

## 2022-09-22 NOTE — PROGRESS NOTES
Paged admitting and Dr. Jasmine. MD is no longer admitting. Dr. Manning notified. Signed and held orders released. No needs at this time.

## 2022-09-22 NOTE — ASSESSMENT & PLAN NOTE
Discussed by prior treatment team.  had a prolonged discussion with the patient regarding goals of care, diagnoses, prognosis, and CODE STATUS. We discussed his   prognosis and comorbidities. I spent  16  minutes on advanced care planning in addition to the time spent managing the other medical problems.    He requested to remain full code

## 2022-09-22 NOTE — ASSESSMENT & PLAN NOTE
The patient has a history of follicular lymphoma (on immunotherapy).    last immunotherapy infusion to treat his cancer in [place] on 09/09/2022  Follow-up with outpatient

## 2022-09-22 NOTE — PROGRESS NOTES
Med Rec complete per patient  Allergies reviewed  No oral antibiotics in the last 30 days per patient

## 2022-09-22 NOTE — PROGRESS NOTES
Pt transferred to floor from [place]. Pt A&O4, VSS, on 2L. Pt updated on POC, all questions answered. Tele box on and monitor room notified. Pt oriented to room and educated to use call light for assistance.      4 Eyes Skin Assessment Completed by MARCOS Carver and MARCOS Simeon.    Head small scattered scabbing throughout    Ears Redness and Blanching  Nose WDL  Mouth WDL  Neck WDL  Breast/Chest WDL  Shoulder Blades WDL  Spine WDL  (R) Arm/Elbow/Hand Redness  (L) Arm/Elbow/Hand Redness  Abdomen WDL  Groin WDL  Scrotum/Coccyx/Buttocks Redness and Blanching  (R) Leg Swelling  (L) Leg Swelling  (R) Heel/Foot/Toe Redness and Blanching  (L) Heel/Foot/Toe Redness and Blanching          Devices In Places Tele Box, Blood Pressure Cuff, Pulse Ox, and Nasal Cannula      Interventions In Place Gray Ear Foams, Pillows, Q2 Turns, Heels Loaded W/Pillows, and Pressure Redistribution Mattress    Possible Skin Injury No    Pictures Uploaded Into Epic N/A  Wound Consult Placed N/A  RN Wound Prevention Protocol Ordered No

## 2022-09-22 NOTE — PROGRESS NOTES
Hospital Medicine Daily Progress Note    Date of Service  9/22/2022    Chief Complaint  Ceferino Fitch is a 72 y.o. male admitted 9/21/2022 with elevated troponin     Hospital Course  This is a a 72 y.o. 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    Patient initially presented to OSH and was transferred here for high level of care.  Cardiology has been consulted and patient admitted for further work up and treatment     Interval Problem Update  Patient seen and examined, afebrile, no overnight evens, still feeling SOB, denies any chest pain,. No nausea or vomiting.   Cardiology has been consulted, echo pending appreciate rec.   Concern for possible HF, on IV lasix  Regarding his covid infection cont on dexamethasone     I have discussed this patient's plan of care and discharge plan at IDT rounds today with Case Management, Nursing, Nursing leadership, and other members of the IDT team.    Consultants/Specialty  cardiology    Code Status  Full Code    Disposition  Patient is not medically cleared for discharge.   Anticipate discharge to to home with close outpatient follow-up.  I have placed the appropriate orders for post-discharge needs.    Review of Systems  ROS     Physical Exam  Temp:  [35.7 °C (96.3 °F)-37 °C (98.6 °F)] 35.7 °C (96.3 °F)  Pulse:  [69-97] 69  Resp:  [17-29] 17  BP: ()/(47-62) 103/53  SpO2:  [92 %-96 %] 93 %    Physical Exam    Fluids    Intake/Output Summary (Last 24 hours) at 9/22/2022 1315  Last data filed at 9/22/2022 0950  Gross per 24 hour   Intake 120 ml   Output --   Net 120 ml       Laboratory  Recent Labs     09/21/22  1130   WBC 2.5*   RBC 3.15*   HEMOGLOBIN 10.0*   HEMATOCRIT 28.6*   MCV 90.8   MCH 31.7   MCHC 35.0   RDW 14.6*   PLATELETCT 100*   MPV 10.6*     Recent Labs      09/21/22  1130   SODIUM 131*   POTASSIUM 3.3*   CHLORIDE 105   CO2 16*   GLUCOSE 94   BUN 32*   CREATININE 2.2*   CALCIUM 7.4*     Recent Labs     09/21/22  1830   APTT 34.4   INR 1.43*     Recent Labs     09/21/22  1130   BNPBTYPENAT 58951*           Imaging  DX-CHEST-LIMITED (1 VIEW)   Final Result      1.  Multifocal pneumonia. Pulmonary edema could have a similar appearance.      US-RUQ   Final Result      Unremarkable RIGHT upper quadrant ultrasound      EC-ECHOCARDIOGRAM COMPLETE W/ CONT    (Results Pending)        Assessment/Plan  * NSTEMI (non-ST elevated myocardial infarction) (HCC)- (present on admission)  Assessment & Plan    Admit to telemetry under  Echo pending   Cardiology consulted appreciate rec.   O2 2L per nasal cannula to keep saturation more than 92%  aspirin 81 mg daily  Lipitor 40 mg HS  Also on lasix as per surgery rec.       Advance care planning  Assessment & Plan  I had a prolonged discussion with the patient regarding goals of care, diagnoses, prognosis, and CODE STATUS. We discussed his   prognosis and comorbidities. I spent  16  minutes on advanced care planning in addition to the time spent managing the other medical problems.    He requested to remain full code      Elevated liver enzymes  Assessment & Plan  Check hepatitis panel  Continue monitor  Get right upper quadrant ultrasound      Elevated brain natriuretic peptide (BNP) level  Assessment & Plan  Likely in setting of COVID-19 infection  Euvolemic on exam  Echo ordered    CKD (chronic kidney disease)  Assessment & Plan  Renal function remained stable compared to his labs  Continue to monitor  Avoid nephrotoxin    Pancytopenia (HCC)  Assessment & Plan  In setting of immunotherapy    Hypokalemia  Assessment & Plan  Started on oral kcl   Check magnesium  Monitor BMP closely     Low bicarbonate  Assessment & Plan  In setting of CKD  Started on oral sodium bicarbonate    COVID  Assessment & Plan    Activate sepsis protocol  COVID  Isolation  NC 2 LPM keep O2 Sat >92%currently on nasal cannula  Continue on Decadron  Monitor oxygen saturation closely      Acute respiratory failure with hypoxia (HCC)  Assessment & Plan  Respiratory failure in setting of covid -19 vs ? 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    last immunotherapy infusion to treat his cancer in [place] on 09/09/2022  Follow-up with outpatient       VTE prophylaxis: heparin ppx    I have performed a physical exam and reviewed and updated ROS and Plan today (9/22/2022). In review of yesterday's note (9/21/2022), there are no changes except as documented above.

## 2022-09-22 NOTE — H&P
Hospital Medicine History & Physical Note    Date of Service  9/21/2022    Primary Care Physician  Pcp Pt States None    Consultants  cardiology    Specialist Names: DR Michel     Code Status  Full Code    Chief Complaint  No chief complaint on file.      History of Presenting Illness  Ceferino Fitch is a 72 y.o. 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        Patient seen and examined at bedside.  He is AOx3.  Not in acute distress.  Reports of cough.  Denies fever, chest pain, shortness of breath, syncope.  Denies any weakness/numbness.  He tested positive for COVID-19 infection 1 week ago.  Last immunotherapy was 2 weeks ago.  Patient reports of family history of CAD.  He denies smoking/drinking alcohol.  Denies any previous history of MI.  He do have history of aortic stenosis.    Currently on 2 L oxygen saturating over 90.  Blood pressure is stable.  Labs noted with pancytopenia, sodium 130, potassium 3.3, elevated BUN/creatinine which is his baseline in setting of CKD, significantly elevated troponin of 6.6 trended down to 5.49.    Cardiology Dr. Michel was consulted for the evaluation elevated troponin    I discussed the plan of care with patient.    Review of Systems  Review of Systems   Constitutional:  Positive for malaise/fatigue. Negative for fever.   HENT:  Negative for hearing loss.    Eyes:  Negative for blurred vision.   Respiratory:  Positive for cough. Negative for shortness of breath.    Cardiovascular:  Negative for chest pain.   Gastrointestinal:  Negative for nausea and vomiting.   Genitourinary:  Negative for dysuria.   Musculoskeletal:  Negative for myalgias.   Skin:  Negative for rash.   Neurological:  Negative for dizziness.   Endo/Heme/Allergies:  Does not bruise/bleed easily.     Past  Medical History   has a past medical history of Cancer (HCC), Follicular lymphoma (HCC), and Hypertension.    Surgical History  Reviewed.  No surgical history    Family history    Family history reviewed with patient. There is no family history that is pertinent to the chief complaint.   Dad has history of CAD    Social History   reports that he has quit smoking. He has been exposed to tobacco smoke. He has never used smokeless tobacco. He reports that he does not currently use alcohol after a past usage of about 3.0 oz per week. He reports that he does not use drugs.    Allergies  Allergies   Allergen Reactions    Penicillins Rash and Unspecified     Hoarseness         Medications  Prior to Admission Medications   Prescriptions Last Dose Informant Patient Reported? Taking?   Cyanocobalamin 2000 MCG Tab 9/20/2022 at 1600  Yes No   Sig: Take 1 Tablet by mouth every day.   acyclovir (ZOVIRAX) 400 MG tablet 9/21/2022 at 800  Yes No   Sig: Take 400 mg by mouth every day.   allopurinol (ZYLOPRIM) 100 MG Tab 9/21/2022 at 0800 Patient's Home Pharmacy Yes No   Sig: Take 100 mg by mouth every day.   aspirin 81 MG EC tablet 9/21/2022 at 800  Yes No   Sig: Take 81 mg by mouth every day.   ipratropium-albuterol (COMBIVENT RESPIMAT)  MCG/ACT Aero Soln   No No   Sig: Inhale 1 Puff 4 times a day.   ondansetron (ZOFRAN) 4 MG Tab tablet   Yes No   Sig: Take 4 mg by mouth every 8 hours as needed.   rosuvastatin (CRESTOR) 20 MG Tab 9/20/2022 at 1600  Yes No   Sig: Take 20 mg by mouth every day.   vitamin D (VITAMIND D3) 1000 UNIT Tab 9/20/2022 at 1600  Yes No   Sig: Take 2,000 Units by mouth every day.      Facility-Administered Medications: None       Physical Exam  Temp:  [36.5 °C (97.7 °F)-37.6 °C (99.7 °F)] 36.5 °C (97.7 °F)  Pulse:  [] 95  Resp:  [19-32] 22  BP: ()/(50-62) 98/62  SpO2:  [84 %-96 %] 95 %  Blood Pressure : (!) 98/62   Temperature: 36.5 °C (97.7 °F)   Pulse: 95   Respiration: (!) 22   Pulse  Oximetry: 95 %       Physical Exam  Constitutional:       General: He is not in acute distress.  HENT:      Head: Normocephalic and atraumatic.      Right Ear: Tympanic membrane normal.      Left Ear: Tympanic membrane normal.      Nose: Nose normal.      Mouth/Throat:      Mouth: Mucous membranes are dry.   Eyes:      Extraocular Movements: Extraocular movements intact.      Pupils: Pupils are equal, round, and reactive to light.   Cardiovascular:      Rate and Rhythm: Normal rate and regular rhythm.      Pulses: Normal pulses.   Pulmonary:      Effort: Pulmonary effort is normal. No respiratory distress.   Abdominal:      General: Abdomen is flat. There is no distension.   Musculoskeletal:      Cervical back: Normal range of motion.      Right lower leg: No edema.      Left lower leg: No edema.   Skin:     General: Skin is warm.   Neurological:      General: No focal deficit present.      Mental Status: He is alert and oriented to person, place, and time. Mental status is at baseline.   Psychiatric:         Mood and Affect: Mood normal.       Laboratory:  Recent Labs     09/21/22  1130   WBC 2.5*   RBC 3.15*   HEMOGLOBIN 10.0*   HEMATOCRIT 28.6*   MCV 90.8   MCH 31.7   MCHC 35.0   RDW 14.6*   PLATELETCT 100*   MPV 10.6*     Recent Labs     09/21/22  1130   SODIUM 131*   POTASSIUM 3.3*   CHLORIDE 105   CO2 16*   GLUCOSE 94   BUN 32*   CREATININE 2.2*   CALCIUM 7.4*     Recent Labs     09/21/22  1130   ALTSGPT 122*   ASTSGOT 284*   ALKPHOSPHAT 84   TBILIRUBIN 0.7   GLUCOSE 94         No results for input(s): NTPROBNP in the last 72 hours.      No results for input(s): TROPONINT in the last 72 hours.    Imaging:  No orders to display       X-Ray:  I have personally reviewed the images and compared with prior images.  EKG:  I have personally reviewed the images and compared with prior images.    Assessment/Plan:  Justification for Admission Status  I anticipate this patient will require at least two midnights for  appropriate medical management, necessitating inpatient admission because NSTEMI        * NSTEMI (non-ST elevated myocardial infarction) (HCC)- (present on admission)  Assessment & Plan    Admit to telemetry under  Telemetry monitoring  Follow serial troponin/CK-MB/EKG  Transthoracic echocardiograph  O2 2L per nasal cannula to keep saturation more than 92%  aspirin 81 mg daily  Lipitor 40 mg HS  Cardiology consulted  Check lipid panel  CBC/BMP AM      Elevated liver enzymes  Assessment & Plan  Check hepatitis panel  Continue monitor  Get right upper quadrant ultrasound      Elevated brain natriuretic peptide (BNP) level  Assessment & Plan  Likely in setting of COVID-19 infection  Euvolemic on exam  Echo ordered    CKD (chronic kidney disease)  Assessment & Plan  Renal function remained stable compared to his labs  Continue to monitor  Avoid nephrotoxin    Pancytopenia (HCC)  Assessment & Plan  In setting of immunotherapy    Hypokalemia  Assessment & Plan  Started on oral kcl   Check magnesium  Monitor BMP closely     Low bicarbonate  Assessment & Plan  In setting of CKD  Started on oral sodium bicarbonate    COVID  Assessment & Plan    Activate sepsis protocol  COVID Isolation  NC 2 LPM keep O2 Sat >92%currently on nasal cannula  Continue on Decadron  Monitor oxygen saturation closely      Acute respiratory failure with hypoxia (HCC)  Assessment & Plan  Respiratory failure in setting of covid -19   Continue to  monitor oxygen saturation closely  Incentive spirometry  Continue  Steroid   Ambulatory O2 evaluation in a.m.      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    last immunotherapy infusion to treat his cancer in [place] on 09/09/2022  Follow-up with outpatient      VTE prophylaxis: SCDs/TEDs  and therapeutic anticoagulation with heparin

## 2022-09-23 ENCOUNTER — APPOINTMENT (OUTPATIENT)
Dept: RADIOLOGY | Facility: MEDICAL CENTER | Age: 72
DRG: 177 | End: 2022-09-23
Attending: PHYSICIAN ASSISTANT
Payer: MEDICARE

## 2022-09-23 ENCOUNTER — APPOINTMENT (OUTPATIENT)
Dept: RADIOLOGY | Facility: MEDICAL CENTER | Age: 72
DRG: 177 | End: 2022-09-23
Payer: MEDICARE

## 2022-09-23 LAB
ANION GAP SERPL CALC-SCNC: 12 MMOL/L (ref 7–16)
BUN SERPL-MCNC: 49 MG/DL (ref 8–22)
CALCIUM SERPL-MCNC: 7.3 MG/DL (ref 8.5–10.5)
CHLORIDE SERPL-SCNC: 105 MMOL/L (ref 96–112)
CO2 SERPL-SCNC: 15 MMOL/L (ref 20–33)
CREAT SERPL-MCNC: 2.34 MG/DL (ref 0.5–1.4)
ERYTHROCYTE [DISTWIDTH] IN BLOOD BY AUTOMATED COUNT: 46.5 FL (ref 35.9–50)
GFR SERPLBLD CREATININE-BSD FMLA CKD-EPI: 29 ML/MIN/1.73 M 2
GLUCOSE SERPL-MCNC: 197 MG/DL (ref 65–99)
HCT VFR BLD AUTO: 25.4 % (ref 42–52)
HGB BLD-MCNC: 9 G/DL (ref 14–18)
MCH RBC QN AUTO: 31.9 PG (ref 27–33)
MCHC RBC AUTO-ENTMCNC: 35.4 G/DL (ref 33.7–35.3)
MCV RBC AUTO: 90.1 FL (ref 81.4–97.8)
PLATELET # BLD AUTO: 89 K/UL (ref 164–446)
PMV BLD AUTO: 11.5 FL (ref 9–12.9)
POTASSIUM SERPL-SCNC: 3.8 MMOL/L (ref 3.6–5.5)
RBC # BLD AUTO: 2.82 M/UL (ref 4.7–6.1)
SODIUM SERPL-SCNC: 132 MMOL/L (ref 135–145)
WBC # BLD AUTO: 3.2 K/UL (ref 4.8–10.8)

## 2022-09-23 PROCEDURE — 99233 SBSQ HOSP IP/OBS HIGH 50: CPT | Mod: GC | Performed by: INTERNAL MEDICINE

## 2022-09-23 PROCEDURE — 71045 X-RAY EXAM CHEST 1 VIEW: CPT

## 2022-09-23 PROCEDURE — 770020 HCHG ROOM/CARE - TELE (206)

## 2022-09-23 PROCEDURE — 85027 COMPLETE CBC AUTOMATED: CPT

## 2022-09-23 PROCEDURE — 700111 HCHG RX REV CODE 636 W/ 250 OVERRIDE (IP): Performed by: INTERNAL MEDICINE

## 2022-09-23 PROCEDURE — 99232 SBSQ HOSP IP/OBS MODERATE 35: CPT | Performed by: INTERNAL MEDICINE

## 2022-09-23 PROCEDURE — 74174 CTA ABD&PLVS W/CONTRAST: CPT

## 2022-09-23 PROCEDURE — 700102 HCHG RX REV CODE 250 W/ 637 OVERRIDE(OP): Performed by: STUDENT IN AN ORGANIZED HEALTH CARE EDUCATION/TRAINING PROGRAM

## 2022-09-23 PROCEDURE — A9270 NON-COVERED ITEM OR SERVICE: HCPCS | Performed by: INTERNAL MEDICINE

## 2022-09-23 PROCEDURE — 74176 CT ABD & PELVIS W/O CONTRAST: CPT

## 2022-09-23 PROCEDURE — 36415 COLL VENOUS BLD VENIPUNCTURE: CPT

## 2022-09-23 PROCEDURE — 700102 HCHG RX REV CODE 250 W/ 637 OVERRIDE(OP): Performed by: INTERNAL MEDICINE

## 2022-09-23 PROCEDURE — 80048 BASIC METABOLIC PNL TOTAL CA: CPT

## 2022-09-23 PROCEDURE — A9270 NON-COVERED ITEM OR SERVICE: HCPCS | Performed by: STUDENT IN AN ORGANIZED HEALTH CARE EDUCATION/TRAINING PROGRAM

## 2022-09-23 RX ORDER — FUROSEMIDE 10 MG/ML
60 INJECTION INTRAMUSCULAR; INTRAVENOUS
Status: DISCONTINUED | OUTPATIENT
Start: 2022-09-23 | End: 2022-09-23

## 2022-09-23 RX ORDER — FUROSEMIDE 10 MG/ML
40 INJECTION INTRAMUSCULAR; INTRAVENOUS
Status: DISCONTINUED | OUTPATIENT
Start: 2022-09-23 | End: 2022-09-24

## 2022-09-23 RX ADMIN — FUROSEMIDE 40 MG: 10 INJECTION, SOLUTION INTRAMUSCULAR; INTRAVENOUS at 06:41

## 2022-09-23 RX ADMIN — DOXYCYCLINE 100 MG: 100 TABLET, FILM COATED ORAL at 18:38

## 2022-09-23 RX ADMIN — DEXAMETHASONE 6 MG: 4 TABLET ORAL at 06:31

## 2022-09-23 RX ADMIN — SODIUM BICARBONATE 650 MG: 650 TABLET ORAL at 06:31

## 2022-09-23 RX ADMIN — HEPARIN SODIUM 5000 UNITS: 5000 INJECTION INTRAVENOUS; SUBCUTANEOUS at 06:30

## 2022-09-23 RX ADMIN — ASPIRIN 81 MG: 81 TABLET, COATED ORAL at 06:31

## 2022-09-23 RX ADMIN — DOXYCYCLINE 100 MG: 100 TABLET, FILM COATED ORAL at 06:31

## 2022-09-23 RX ADMIN — HEPARIN SODIUM 5000 UNITS: 5000 INJECTION INTRAVENOUS; SUBCUTANEOUS at 23:03

## 2022-09-23 RX ADMIN — POTASSIUM CHLORIDE 40 MEQ: 1500 TABLET, EXTENDED RELEASE ORAL at 06:30

## 2022-09-23 RX ADMIN — HEPARIN SODIUM 5000 UNITS: 5000 INJECTION INTRAVENOUS; SUBCUTANEOUS at 14:00

## 2022-09-23 ASSESSMENT — ENCOUNTER SYMPTOMS
DIZZINESS: 0
CARDIOVASCULAR NEGATIVE: 1
GASTROINTESTINAL NEGATIVE: 1
EYES NEGATIVE: 1
BLURRED VISION: 0
BRUISES/BLEEDS EASILY: 0
MUSCULOSKELETAL NEGATIVE: 1
COUGH: 1
NEUROLOGICAL NEGATIVE: 1
FEVER: 0
SHORTNESS OF BREATH: 0
PSYCHIATRIC NEGATIVE: 1
MYALGIAS: 0
NAUSEA: 0
VOMITING: 0
SHORTNESS OF BREATH: 1

## 2022-09-23 NOTE — PROGRESS NOTES
[facility] Cardiology Follow-up Note    Date of Service:    9/23/2022      Name:   Ceferino Fitch     YOB: 1950  Age:   72 y.o.  male   MRN:   [id number]      Reason for cardiology consult: Elevated troponins    Attending Provider: Dr. Escobar    Primary Cardiologist: Previously in [place], [place]    HPI:  72-year-old male with a past medical history of chronic kidney disease with baseline creatinine about 1.5.  He was admitted to the hospital last week with acute on chronic CKD in the setting of COVID-pneumonia.  He was complaining of weakness and discharged.  He also has known mild aortic stenosis.  He had an echocardiogram completed 11/13/2019 which was normal.  He has not had 1 since then in our system.     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  He was transferred from the outside facility for an elevated troponin with T wave inversions.  He said he is been complaining mostly of fatigue and weakness but no chest pain pressure or shortness of breath.  His wife told him to come in for evaluation which is what led to the discovery of his high troponin.  His troponin at the outside hospital was 6.6 which reduced to 5.  His sodium was 130 with a potassium of 3.3.  His NT proBNP was elevated at 19,400.    Interim Events:  Pt still on oxygen overnight. Feeling weak. Not 100%.     ROS  Constitutional:  + fatigue.  Respiratory:  Denies shortness of breath, no cough.  Cardiovascular:  No chest pain.  No lower extremity edema.  Denies orthopnea or PND.  : No polyuria, no dysuria.  GI:  Denies nausea/vomiting.  No abdominal distention.  Neuro:  Denies dizziness, syncope.  Hem/lymph: Denies easy bleeding/bruising.      All other review of systems reviewed and negative.    Past medical, surgical, social, and family history reviewed and unchanged from admission except as noted in HPI.    Medications: Reviewed in MAR  Current Facility-Administered Medications    Medication Dose Frequency Provider Last Rate Last Admin    furosemide (LASIX) injection 60 mg  60 mg BID DIURETIC Carline Bledsoe P.A.-C.        aspirin EC (ECOTRIN) tablet 81 mg  81 mg DAILY Jennifer Villarreal M.D.   81 mg at 09/23/22 0631    heparin injection 5,000 Units  5,000 Units Q8HRS Jennifer Villarreal M.D.   5,000 Units at 09/23/22 0630    dexamethasone (DECADRON) tablet 6 mg  6 mg DAILY Judah Chen M.D.   6 mg at 09/23/22 0631    doxycycline monohydrate (ADOXA) tablet 100 mg  100 mg Q12HRS Judah Chen M.D.   100 mg at 09/23/22 0631   Last reviewed on 9/21/2022  5:36 PM by Elle Bah   Allergies   Allergen Reactions    Penicillins Rash and Unspecified     Hoarseness         Physical Exam  Body mass index is 21.83 kg/m². BP (!) 91/43   Pulse 71   Temp 35.9 °C (96.6 °F) (Temporal)   Resp 18   Wt 71 kg (156 lb 8.4 oz)   SpO2 91%    Vitals:    09/23/22 0639 09/23/22 0645 09/23/22 0730 09/23/22 1140   BP: 114/56 111/55 103/39 (!) 91/43   Pulse:   69 71   Resp:   18 18   Temp:   36.3 °C (97.4 °F) 35.9 °C (96.6 °F)   TempSrc:   Temporal Temporal   SpO2:   94% 91%   Weight:        Oxygen Therapy:  Pulse Oximetry: 91 %, O2 (LPM): 2, O2 Delivery Device: Silicone Nasal Cannula    General: no acute distress, ill appearing  Neck: No JVD, no bruits  Lungs: CTAB, normal effort. no wheezing, rales, or rhonchi  Heart: RRR, normal S1 /S2, + murmur, no rub  EXT: No lower extremity edema, 2+ radial pulses. 2+ pedal pulses.   Abdomen: soft, non tender, non distended  Neurological: No focal deficits, no facial asymmetry.  Normal speech  Psychiatric: Appropriate affect, alert and oriented x 3  Skin: Warm and dry extremities, no rashes    Labs (personally reviewed):     Lab Results   Component Value Date/Time    SODIUM 132 (L) 09/23/2022 03:29 AM    POTASSIUM 3.8 09/23/2022 03:29 AM    CHLORIDE 105 09/23/2022 03:29 AM    CO2 15 (L) 09/23/2022 03:29 AM    GLUCOSE 197 (H) 09/23/2022 03:29 AM    BUN 49 (H) 09/23/2022 03:29 AM     CREATININE 2.34 (H) 09/23/2022 03:29 AM     Lab Results   Component Value Date/Time    ALKPHOSPHAT 84 09/21/2022 11:30 AM    ASTSGOT 284 (H) 09/21/2022 11:30 AM    ALTSGPT 122 (H) 09/21/2022 11:30 AM    TBILIRUBIN 0.7 09/21/2022 11:30 AM      Lab Results   Component Value Date/Time    CHOLSTRLTOT 144 03/11/2022 08:50 AM    LDL 57 03/11/2022 08:50 AM    HDL 73 (H) 03/11/2022 08:50 AM    TRIGLYCERIDE 112 03/11/2022 08:50 AM       Cardiac Imaging and Procedures Review:      EKG:   Sinus rhythm, at 69      Echo: 9/22/22    CONCLUSIONS  No prior study is available for comparison.   Reduced left ventricular systolic function. The left ventricular   ejection fraction is visually estimated to be 20%. Hypokinesis of the   inferior and inferolateral segments.  Cannot rule out low flow low gradient severe aortic stenosis.  Mild eccentric aortic insufficiency.        Assessment and Medical Decision Making:  Pt is a 71 yo male with and BRADLEY on CKD, Likely severe aortic stenosis, covid-19 infection, and ADHFrEF. Cardiology is consulted for elevated troponins and concern for NSTEMI.     Impressions:  1.  Low flow, low velocity gradient - likely severe aortic stenosis  2.  HFrEF with EF of 20%  3.  BRADLEY on CKD. Cr of 2.5  4.  Acute hypoxemic respiratory failure  5.  Elevated troponins        Recommendations:  Given the low flow low gradient and significant EF of 20% in combination with his hx of moderate aortic stenosis back in 2019 it is most likely that his aortic stenosis has progressed to severe aortic stenosis.  However, we are unable to make that formal diagnosis without further testing.     1.  The pt appears volume overloaded. Recommending continuing diuresis with 60 mg IV lasix BID. Chest x-ray from today pending. Continue monitoring kidney function.  2.  CT scan chest. Pre-TAVR assessment with valve calcium scoring to better assess severity of aortic stenosis. Without contrast given Cr and BRADLEY. Study ordered and discussed  with CT tech.  3.  Supplemental O2 as needed.  4.  We will continue to follow this patient.    This note was generated prior to assessment by Dr. Escobar  Please see Dr. Escobar's attestation for additions and further recommendations.    Thank you for this interesting consult. Please reach out if you have any questions.    Enrique Johnson MD, PGY2  [facility]  9/23/2022

## 2022-09-23 NOTE — CONSULTS
REFERRING PHYSICIAN: Vic Escobar MD.     CONSULTING PHYSICIAN: Derrell Vaughan DO     CHIEF COMPLAINT: Shortness of breath     HISTORY OF PRESENT ILLNESS: The patient is a 72 y.o. 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 Today he states he had Covid two weeks ago, he had increasing shortness of breath even at rest over the last two weeks. He has been diagnosed with covid pneumonia. An echocardiogram also showed low flow low gradient aortic stenosis. He denies chest pain, lower extremity edema, dizziness, syncope, orthopnea, or PND.      PAST MEDICAL HISTORY:   Active Ambulatory Problems     Diagnosis Date Noted    Recurrent lymphoma (HCC) 08/09/2022    Mild aortic stenosis 07/26/2017    Irritable bowel syndrome with diarrhea 06/16/2017    Follicular lymphoma grade I (HCC) 06/08/2022    Follicular lymphoma (HCC) 12/14/2018    Essential hypertension, benign 09/22/2015    Adenomatous polyp 09/26/2017     Resolved Ambulatory Problems     Diagnosis Date Noted    Lab test positive for detection of COVID-19 virus 07/24/2022    BRADLEY (acute kidney injury) (HCC) 09/13/2022    Weakness 09/13/2022    Pneumonia 09/13/2022     Past Medical History:   Diagnosis Date    Cancer (HCC)     Hypertension        PAST SURGICAL HISTORY: No past surgical history on file.     ALLERGIES:   Allergies   Allergen Reactions    Penicillins Rash and Unspecified     Hoarseness          CURRENT MEDICATIONS:   Current Facility-Administered Medications:     furosemide (LASIX) injection 60 mg, 60 mg, Intravenous, BID DIURETIC, Carline Bledsoe P.A.-C.    aspirin EC (ECOTRIN) tablet 81 mg, 81 mg, Oral, DAILY, Jennifer Villarreal M.D., 81 mg at 09/23/22 0631    heparin injection 5,000 Units, 5,000 Units, Subcutaneous, Q8HRS, Jennifer Villarreal M.D., 5,000 Units at 09/23/22 0630    dexamethasone (DECADRON) tablet 6 mg, 6 mg, Oral, DAILY, Judah WESTON  VASYL Chen, 6 mg at 09/23/22 0631    doxycycline monohydrate (ADOXA) tablet 100 mg, 100 mg, Oral, Q12HRS, Judah WESTON VASYL Chen, 100 mg at 09/23/22 0631    FAMILY HISTORY: No family history on file.     SOCIAL HISTORY:   Social History     Socioeconomic History    Marital status:      Spouse name: Not on file    Number of children: Not on file    Years of education: Not on file    Highest education level: Not on file   Occupational History    Not on file   Tobacco Use    Smoking status: Former     Passive exposure: Past    Smokeless tobacco: Never   Vaping Use    Vaping Use: Not on file   Substance and Sexual Activity    Alcohol use: Not Currently     Alcohol/week: 3.0 oz     Types: 5 Cans of beer per week    Drug use: Never    Sexual activity: Not on file   Other Topics Concern    Not on file   Social History Narrative    Not on file     Social Determinants of Health     Financial Resource Strain: Not on file   Food Insecurity: Not on file   Transportation Needs: Not on file   Physical Activity: Not on file   Stress: Not on file   Social Connections: Not on file   Intimate Partner Violence: Not on file   Housing Stability: Not on file     REVIEW OF SYSTEMS:  Review of Systems   Constitutional:  Positive for malaise/fatigue.   HENT: Negative.     Eyes: Negative.    Respiratory:  Positive for shortness of breath.    Cardiovascular: Negative.    Gastrointestinal: Negative.    Genitourinary: Negative.    Musculoskeletal: Negative.    Skin: Negative.    Neurological: Negative.    Endo/Heme/Allergies: Negative.    Psychiatric/Behavioral: Negative.       PHYSICAL EXAMINATION:    BP (!) (P) 91/43 Comment: Rn notified   Pulse (P) 71   Temp (P) 35.9 °C (96.6 °F) (Temporal)   Resp (P) 18   Wt 71 kg (156 lb 8.4 oz)   SpO2 94%   BMI 21.83 kg/m²     Physical Exam  Constitutional:       General: He is not in acute distress.  HENT:      Head: Normocephalic and atraumatic.      Right Ear: Hearing normal.      Left Ear:  Hearing normal.      Nose: Nose normal.      Mouth/Throat:      Dentition: Normal dentition.      Pharynx: Uvula midline.   Eyes:      Conjunctiva/sclera: Conjunctivae normal.      Pupils: Pupils are equal, round, and reactive to light.   Neck:      Thyroid: No thyromegaly.      Vascular: No JVD.      Trachea: Trachea normal.   Cardiovascular:      Rate and Rhythm: Normal rate and regular rhythm.      Heart sounds: Murmur heard.   Systolic murmur is present with a grade of 3/6.   Pulmonary:      Effort: Pulmonary effort is normal.      Breath sounds: Normal breath sounds.   Abdominal:      General: Bowel sounds are normal.      Palpations: Abdomen is soft.      Tenderness: There is no abdominal tenderness.   Musculoskeletal:         General: Normal range of motion.   Skin:     General: Skin is warm and dry.   Neurological:      Mental Status: He is alert and oriented to person, place, and time.      Sensory: Sensation is intact.      Motor: Motor function is intact.   Psychiatric:         Mood and Affect: Mood and affect normal.         Cognition and Memory: Memory normal.         Judgment: Judgment normal.     LABS REVIEWED:  Lab Results   Component Value Date/Time    SODIUM 132 (L) 09/23/2022 03:29 AM    POTASSIUM 3.8 09/23/2022 03:29 AM    CHLORIDE 105 09/23/2022 03:29 AM    CO2 15 (L) 09/23/2022 03:29 AM    GLUCOSE 197 (H) 09/23/2022 03:29 AM    BUN 49 (H) 09/23/2022 03:29 AM    CREATININE 2.34 (H) 09/23/2022 03:29 AM      Lab Results   Component Value Date/Time    PROTHROMBTM 17.2 (H) 09/21/2022 06:30 PM    INR 1.43 (H) 09/21/2022 06:30 PM      Lab Results   Component Value Date/Time    WBC 3.2 (L) 09/23/2022 03:29 AM    RBC 2.82 (L) 09/23/2022 03:29 AM    HEMOGLOBIN 9.0 (L) 09/23/2022 03:29 AM    HEMATOCRIT 25.4 (L) 09/23/2022 03:29 AM    MCV 90.1 09/23/2022 03:29 AM    MCH 31.9 09/23/2022 03:29 AM    MCHC 35.4 (H) 09/23/2022 03:29 AM    MPV 11.5 09/23/2022 03:29 AM    ANISOCYTOSIS 1+ 07/23/2022 02:00 PM         IMAGING REVIEWED AND INTERPRETED:    ECHOCARDIOGRAM [facility] 9/22/2022:  The left ventricular   ejection fraction is visually estimated to be 20%. Hypokinesis of the inferior and inferolateral segments.  Cannot rule out low flow low gradient severe aortic stenosis.  Transvalvular gradients are - Peak: 28 mmHg, Mean: 18 mmHg. Vmax is 2.63 m/s. Aortic valve area calculated from the continuity equation is 1 cm2.   Mild eccentric aortic insufficiency.    ANGIOGRAM: none      IMPRESSION:  Symptomatic aortic stenosis, NSTEMI, lymphoma, covid PNA      PLAN:  I recommend TAVR. He would be high risk for Surgical valve replacement, and the patient does not want and open procedure. Final determination of candidacy to be made on full work up is complete.     The procedure, its risks, benefits, potential complications and alternative treatments were discussed with the patient in detail including the risks should he decide not to undergo my recommended treatment. All of his questions were answered to his satisfaction and he is willing to proceed with the operation. The risks include death, stroke, infection: to include a rare bacterial infection related to the use of the heart/lung machine, shelley-operative myocardial infarction, dysrhythmias, diaphragmatic paralysis, chest wall paresthesia, tracheostomy, kidney or other organ failure, possible return to the operating room for bleeding, bleeding requiring transfusion with its attendant risks including AIDS or hepatitis, dehiscence of surgical incisions, respiratory complications including the need for prolonged ventilator support, Protamine or other drug reaction, peripheral neuropathy, loss of limb, and miscount of surgical items. The operative mortality risk is approximately 8%. The STS mortality risk score is 6.4% and the morbidity and mortality risk score is 36%. The scores were discussed with patient.    Thank you for this very challenging consultation and participation in the  patient’s care.  I will keep you apprised of all future developments.          Sincerely,     Derrell Vaughan DO    I,  Derrell Vaughan DO performed a substantial portion of the EM visit face-to-face with the same patient on the same date of service with KRYSTAL Astudillo. I was personally involved in reviewing and interpreting the films and conducted elements of the history and physical exam. I performed all of the medical decision making for the patient.

## 2022-09-23 NOTE — PROGRESS NOTES
Hospital Medicine Daily Progress Note    Date of Service  9/23/2022    Chief Complaint  Ceferino Fitch is a 72 y.o. male admitted 9/21/2022 with elevated troponin     Hospital Course  This is a a 72 y.o. 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    Patient initially presented to OSH and was transferred here for high level of care.  Cardiology has been consulted and patient admitted for further work up and treatment     Interval Problem Update  Patient seen and examined, afebrile,  still feeling SOB, denies any chest pain,. No nausea or vomiting.   Echo showing EF of 20 % and also severe aortic stenosis  Cardiology following, cont diuresis appreciate rec. Also CTS has been consulted   Regarding his covid infection cont on dexamethasone     I have discussed this patient's plan of care and discharge plan at IDT rounds today with Case Management, Nursing, Nursing leadership, and other members of the IDT team.    Consultants/Specialty  cardiology    Code Status  Full Code    Disposition  Patient is not medically cleared for discharge.   Anticipate discharge to to home with close outpatient follow-up.  I have placed the appropriate orders for post-discharge needs.    Review of Systems  Review of Systems   Constitutional:  Positive for malaise/fatigue. Negative for fever.   HENT:  Negative for hearing loss.    Eyes:  Negative for blurred vision.   Respiratory:  Positive for cough. Negative for shortness of breath.    Cardiovascular:  Negative for chest pain.   Gastrointestinal:  Negative for nausea and vomiting.   Genitourinary:  Negative for dysuria.   Musculoskeletal:  Negative for myalgias.   Skin:  Negative for rash.   Neurological:  Negative for dizziness.   Endo/Heme/Allergies:  Does not bruise/bleed easily.       Physical Exam  Temp:  [35.9 °C (96.6 °F)-36.7 °C (98 °F)] 35.9 °C (96.6 °F)  Pulse:  [69-82] 71  Resp:  [18] 18  BP: ()/(39-56) 91/43  SpO2:  [88 %-99 %] 91 %    Physical Exam  Constitutional:       General: He is not in acute distress.  HENT:      Head: Normocephalic and atraumatic.      Right Ear: Tympanic membrane normal.      Left Ear: Tympanic membrane normal.      Nose: Nose normal.      Mouth/Throat:      Mouth: Mucous membranes are dry.   Eyes:      Extraocular Movements: Extraocular movements intact.      Pupils: Pupils are equal, round, and reactive to light.   Cardiovascular:      Rate and Rhythm: Normal rate and regular rhythm.      Pulses: Normal pulses.   Pulmonary:      Effort: Pulmonary effort is normal. No respiratory distress.   Abdominal:      General: Abdomen is flat. There is no distension.      Tenderness: There is no abdominal tenderness. There is no guarding or rebound.   Musculoskeletal:      Cervical back: Normal range of motion.      Right lower leg: No edema.      Left lower leg: No edema.   Skin:     General: Skin is warm.   Neurological:      General: No focal deficit present.      Mental Status: He is alert and oriented to person, place, and time. Mental status is at baseline.   Psychiatric:         Mood and Affect: Mood normal.       Fluids    Intake/Output Summary (Last 24 hours) at 9/23/2022 1422  Last data filed at 9/23/2022 1140  Gross per 24 hour   Intake --   Output 1150 ml   Net -1150 ml       Laboratory  Recent Labs     09/21/22  1130 09/22/22  1400 09/23/22  0329   WBC 2.5* 2.1* 3.2*   RBC 3.15* 2.96* 2.82*   HEMOGLOBIN 10.0* 9.5* 9.0*   HEMATOCRIT 28.6* 26.4* 25.4*   MCV 90.8 89.2 90.1   MCH 31.7 32.1 31.9   MCHC 35.0 36.0* 35.4*   RDW 14.6* 47.1 46.5   PLATELETCT 100* 93* 89*   MPV 10.6* 11.5 11.5     Recent Labs     09/21/22  1130 09/22/22  1400 09/23/22  0329   SODIUM 131* 134* 132*   POTASSIUM 3.3* 3.7 3.8   CHLORIDE 105 103 105   CO2 16* 15* 15*   GLUCOSE 94  166* 197*   BUN 32* 43* 49*   CREATININE 2.2* 2.10* 2.34*   CALCIUM 7.4* 7.6* 7.3*     Recent Labs     09/21/22  1830   APTT 34.4   INR 1.43*     Recent Labs     09/21/22  1130   BNPBTYPENAT 31390*           Imaging  EC-ECHOCARDIOGRAM COMPLETE W/O CONT   Final Result      DX-CHEST-LIMITED (1 VIEW)   Final Result      1.  Multifocal pneumonia. Pulmonary edema could have a similar appearance.      US-RUQ   Final Result      Unremarkable RIGHT upper quadrant ultrasound      CT-TAVR CHEST-ABD-PELVIS W/WO POST PROCESS    (Results Pending)   DX-CHEST-PORTABLE (1 VIEW)    (Results Pending)        Assessment/Plan  * NSTEMI (non-ST elevated myocardial infarction) (HCC)- (present on admission)  Assessment & Plan    Echo showing ef of 20% also severer Aortic stenosis  Cardiology following and working him up for possible TAVR   Ion the mean time cont with IV lasix for diuresis   Also CTS has been consulted     Advance care planning  Assessment & Plan  I had a prolonged discussion with the patient regarding goals of care, diagnoses, prognosis, and CODE STATUS. We discussed his   prognosis and comorbidities. I spent  16  minutes on advanced care planning in addition to the time spent managing the other medical problems.    He requested to remain full code      Elevated liver enzymes  Assessment & Plan  Check hepatitis panel  Continue monitor  Get right upper quadrant ultrasound      Elevated brain natriuretic peptide (BNP) level  Assessment & Plan  Likely in setting of COVID-19 infection  Euvolemic on exam  Echo ordered    CKD (chronic kidney disease)  Assessment & Plan  Renal function remained stable compared to his labs  Continue to monitor  Avoid nephrotoxin    Pancytopenia (HCC)  Assessment & Plan  In setting of immunotherapy    Hypokalemia  Assessment & Plan  Started on oral kcl   Check magnesium  Monitor BMP closely     Low bicarbonate  Assessment & Plan  In setting of CKD  Started on oral sodium  bicarbonate    COVID  Assessment & Plan    Activate sepsis protocol  COVID Isolation  NC 2 LPM keep O2 Sat >92%currently on nasal cannula  Continue on Decadron  Monitor oxygen saturation closely      Acute respiratory failure with hypoxia (HCC)  Assessment & Plan  Respiratory failure in setting of covid -19 vs ? 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    last immunotherapy infusion to treat his cancer in [place] on 09/09/2022  Follow-up with outpatient       VTE prophylaxis: heparin ppx    I have performed a physical exam and reviewed and updated ROS and Plan today (9/23/2022). In review of yesterday's note (9/22/2022), there are no changes except as documented above.

## 2022-09-24 ENCOUNTER — APPOINTMENT (OUTPATIENT)
Dept: RADIOLOGY | Facility: MEDICAL CENTER | Age: 72
DRG: 177 | End: 2022-09-24
Attending: INTERNAL MEDICINE
Payer: MEDICARE

## 2022-09-24 LAB
ANION GAP SERPL CALC-SCNC: 12 MMOL/L (ref 7–16)
BASE EXCESS BLDA CALC-SCNC: -7 MMOL/L (ref -4–3)
BODY TEMPERATURE: 36.4 CENTIGRADE
BUN SERPL-MCNC: 56 MG/DL (ref 8–22)
CALCIUM SERPL-MCNC: 7.5 MG/DL (ref 8.5–10.5)
CHLORIDE SERPL-SCNC: 108 MMOL/L (ref 96–112)
CO2 SERPL-SCNC: 17 MMOL/L (ref 20–33)
CREAT SERPL-MCNC: 2.19 MG/DL (ref 0.5–1.4)
D DIMER PPP IA.FEU-MCNC: 3.89 UG/ML (FEU) (ref 0–0.5)
ERYTHROCYTE [DISTWIDTH] IN BLOOD BY AUTOMATED COUNT: 46.6 FL (ref 35.9–50)
GFR SERPLBLD CREATININE-BSD FMLA CKD-EPI: 31 ML/MIN/1.73 M 2
GLUCOSE SERPL-MCNC: 119 MG/DL (ref 65–99)
HCO3 BLDA-SCNC: 16 MMOL/L (ref 17–25)
HCT VFR BLD AUTO: 22.5 % (ref 42–52)
HGB BLD-MCNC: 7.9 G/DL (ref 14–18)
MCH RBC QN AUTO: 31.5 PG (ref 27–33)
MCHC RBC AUTO-ENTMCNC: 35.1 G/DL (ref 33.7–35.3)
MCV RBC AUTO: 89.6 FL (ref 81.4–97.8)
O2/TOTAL GAS SETTING VFR VENT: 96 % (ref 30–60)
PCO2 BLDA: 23.8 MMHG (ref 26–37)
PCO2 TEMP ADJ BLDA: 23.2 MMHG (ref 26–37)
PH BLDA: 7.44 [PH] (ref 7.4–7.5)
PH TEMP ADJ BLDA: 7.45 [PH] (ref 7.4–7.5)
PLATELET # BLD AUTO: 72 K/UL (ref 164–446)
PMV BLD AUTO: 10.3 FL (ref 9–12.9)
PO2 BLDA: 69.9 MMHG (ref 64–87)
PO2 TEMP ADJ BLDA: 67.1 MMHG (ref 64–87)
POTASSIUM SERPL-SCNC: 4.2 MMOL/L (ref 3.6–5.5)
RBC # BLD AUTO: 2.51 M/UL (ref 4.7–6.1)
SAO2 % BLDA: 91.9 % (ref 93–99)
SODIUM SERPL-SCNC: 137 MMOL/L (ref 135–145)
WBC # BLD AUTO: 3.2 K/UL (ref 4.8–10.8)

## 2022-09-24 PROCEDURE — 700102 HCHG RX REV CODE 250 W/ 637 OVERRIDE(OP): Performed by: INTERNAL MEDICINE

## 2022-09-24 PROCEDURE — 85379 FIBRIN DEGRADATION QUANT: CPT

## 2022-09-24 PROCEDURE — 700102 HCHG RX REV CODE 250 W/ 637 OVERRIDE(OP): Performed by: STUDENT IN AN ORGANIZED HEALTH CARE EDUCATION/TRAINING PROGRAM

## 2022-09-24 PROCEDURE — 71045 X-RAY EXAM CHEST 1 VIEW: CPT

## 2022-09-24 PROCEDURE — 99233 SBSQ HOSP IP/OBS HIGH 50: CPT | Performed by: INTERNAL MEDICINE

## 2022-09-24 PROCEDURE — 770020 HCHG ROOM/CARE - TELE (206)

## 2022-09-24 PROCEDURE — A9270 NON-COVERED ITEM OR SERVICE: HCPCS | Performed by: STUDENT IN AN ORGANIZED HEALTH CARE EDUCATION/TRAINING PROGRAM

## 2022-09-24 PROCEDURE — 700105 HCHG RX REV CODE 258: Performed by: INTERNAL MEDICINE

## 2022-09-24 PROCEDURE — 80048 BASIC METABOLIC PNL TOTAL CA: CPT

## 2022-09-24 PROCEDURE — A9270 NON-COVERED ITEM OR SERVICE: HCPCS | Performed by: INTERNAL MEDICINE

## 2022-09-24 PROCEDURE — 700111 HCHG RX REV CODE 636 W/ 250 OVERRIDE (IP): Performed by: INTERNAL MEDICINE

## 2022-09-24 PROCEDURE — 82803 BLOOD GASES ANY COMBINATION: CPT

## 2022-09-24 PROCEDURE — 94640 AIRWAY INHALATION TREATMENT: CPT

## 2022-09-24 PROCEDURE — 94760 N-INVAS EAR/PLS OXIMETRY 1: CPT

## 2022-09-24 PROCEDURE — 85027 COMPLETE CBC AUTOMATED: CPT

## 2022-09-24 PROCEDURE — XW033E5 INTRODUCTION OF REMDESIVIR ANTI-INFECTIVE INTO PERIPHERAL VEIN, PERCUTANEOUS APPROACH, NEW TECHNOLOGY GROUP 5: ICD-10-PCS | Performed by: INTERNAL MEDICINE

## 2022-09-24 PROCEDURE — 700111 HCHG RX REV CODE 636 W/ 250 OVERRIDE (IP): Mod: TB | Performed by: INTERNAL MEDICINE

## 2022-09-24 RX ORDER — METOLAZONE 5 MG/1
2.5 TABLET ORAL
Status: DISCONTINUED | OUTPATIENT
Start: 2022-09-24 | End: 2022-09-25

## 2022-09-24 RX ORDER — FUROSEMIDE 10 MG/ML
60 INJECTION INTRAMUSCULAR; INTRAVENOUS
Status: DISCONTINUED | OUTPATIENT
Start: 2022-09-24 | End: 2022-09-25

## 2022-09-24 RX ORDER — FUROSEMIDE 10 MG/ML
20 INJECTION INTRAMUSCULAR; INTRAVENOUS ONCE
Status: COMPLETED | OUTPATIENT
Start: 2022-09-24 | End: 2022-09-24

## 2022-09-24 RX ADMIN — FUROSEMIDE 60 MG: 10 INJECTION, SOLUTION INTRAMUSCULAR; INTRAVENOUS at 17:16

## 2022-09-24 RX ADMIN — HEPARIN SODIUM 5000 UNITS: 5000 INJECTION INTRAVENOUS; SUBCUTANEOUS at 14:44

## 2022-09-24 RX ADMIN — DEXAMETHASONE 6 MG: 4 TABLET ORAL at 05:33

## 2022-09-24 RX ADMIN — HEPARIN SODIUM 5000 UNITS: 5000 INJECTION INTRAVENOUS; SUBCUTANEOUS at 05:33

## 2022-09-24 RX ADMIN — HEPARIN SODIUM 5000 UNITS: 5000 INJECTION INTRAVENOUS; SUBCUTANEOUS at 22:02

## 2022-09-24 RX ADMIN — DOXYCYCLINE 100 MG: 100 TABLET, FILM COATED ORAL at 05:33

## 2022-09-24 RX ADMIN — FUROSEMIDE 20 MG: 10 INJECTION, SOLUTION INTRAMUSCULAR; INTRAVENOUS at 09:33

## 2022-09-24 RX ADMIN — DOXYCYCLINE 100 MG: 100 TABLET, FILM COATED ORAL at 17:17

## 2022-09-24 RX ADMIN — FUROSEMIDE 40 MG: 10 INJECTION, SOLUTION INTRAMUSCULAR; INTRAVENOUS at 05:32

## 2022-09-24 RX ADMIN — ASPIRIN 81 MG: 81 TABLET, COATED ORAL at 05:33

## 2022-09-24 RX ADMIN — REMDESIVIR 200 MG: 100 INJECTION, POWDER, LYOPHILIZED, FOR SOLUTION INTRAVENOUS at 11:37

## 2022-09-24 ASSESSMENT — ENCOUNTER SYMPTOMS
BLURRED VISION: 0
MYALGIAS: 0
BRUISES/BLEEDS EASILY: 0
COUGH: 1
VOMITING: 0
NAUSEA: 0
DIZZINESS: 0
FEVER: 0
SHORTNESS OF BREATH: 0

## 2022-09-24 ASSESSMENT — FIBROSIS 4 INDEX: FIB4 SCORE: 26.07

## 2022-09-24 NOTE — PROGRESS NOTES
Interval History:  Oxygen requirement increased this morning on 15 L of nonrebreather.    Physical Exam   Blood pressure 117/58, pulse 75, temperature 36.4 °C (97.5 °F), temperature source Temporal, resp. rate 18, weight 71 kg (156 lb 8.4 oz), SpO2 96 %.    Constitutional: Mild distress  HENT: Normocephalic and atraumatic. No scleral icterus.   Neck: No JVD present.   Cardiovascular: Normal rate.  3 x 6 systolic murmur aortic area pulmonary/Chest: Coarse breath sounds abdominal: S/NT/ND BS+   Musculoskeletal: Exhibits no edema. Pulses present.  Skin: Skin is warm and dry.     ROS: As HPI other reviewed and negative       Intake/Output Summary (Last 24 hours) at 9/24/2022 1339  Last data filed at 9/24/2022 1000  Gross per 24 hour   Intake 360 ml   Output 500 ml   Net -140 ml       Recent Labs     09/22/22  1400 09/23/22  0329 09/24/22  0514   WBC 2.1* 3.2* 3.2*   RBC 2.96* 2.82* 2.51*   HEMOGLOBIN 9.5* 9.0* 7.9*   HEMATOCRIT 26.4* 25.4* 22.5*   MCV 89.2 90.1 89.6   MCH 32.1 31.9 31.5   MCHC 36.0* 35.4* 35.1   RDW 47.1 46.5 46.6   PLATELETCT 93* 89* 72*   MPV 11.5 11.5 10.3     Recent Labs     09/22/22  1400 09/23/22  0329 09/24/22  0514   SODIUM 134* 132* 137   POTASSIUM 3.7 3.8 4.2   CHLORIDE 103 105 108   CO2 15* 15* 17*   GLUCOSE 166* 197* 119*   BUN 43* 49* 56*   CREATININE 2.10* 2.34* 2.19*   CALCIUM 7.6* 7.3* 7.5*     Recent Labs     09/21/22  1830   APTT 34.4   INR 1.43*                       No current facility-administered medications on file prior to encounter.     Current Outpatient Medications on File Prior to Encounter   Medication Sig Dispense Refill    acetaminophen (TYLENOL) 325 MG Tab Take 325 mg by mouth one time as needed.      Cyanocobalamin 2000 MCG Tab Take 1 Tablet by mouth every day.      acyclovir (ZOVIRAX) 400 MG tablet Take 400 mg by mouth every day.      allopurinol (ZYLOPRIM) 100 MG Tab Take 100 mg by mouth every day.      aspirin 81 MG EC tablet Take 81 mg by mouth every day.       vitamin D (VITAMIND D3) 1000 UNIT Tab Take 2,000 Units by mouth every day.      rosuvastatin (CRESTOR) 20 MG Tab Take 20 mg by mouth every day.         Current Facility-Administered Medications   Medication Dose Frequency Provider Last Rate Last Admin    [START ON 9/25/2022] remdesivir (Veklury) 100 mg in  mL IVPB  100 mg DAILY AT 1800 Krystal Butler M.D.        furosemide (LASIX) injection 60 mg  60 mg BID DIURETIC Jennifer Villarreal M.D.        aspirin EC (ECOTRIN) tablet 81 mg  81 mg DAILY Jennifer Villarreal M.D.   81 mg at 09/24/22 0533    heparin injection 5,000 Units  5,000 Units Q8HRS Jennifer Villarreal M.D.   5,000 Units at 09/24/22 0533    dexamethasone (DECADRON) tablet 6 mg  6 mg DAILY Judah Chen M.D.   6 mg at 09/24/22 0533    doxycycline monohydrate (ADOXA) tablet 100 mg  100 mg Q12HRS Judah Chen M.D.   100 mg at 09/24/22 0533   Last reviewed on 9/21/2022  5:36 PM by Elle Bah   Medications reviewed    Imaging reviewed    Chest x-ray multifocal pneumonia versus pulmonary edema    Impressions:  72-year-old male patient with COVID pneumonia, severe low-flow low gradient aortic stenosis, cardiomyopathy  Recommendations:  His respiratory status is not improving, unsure if this is all heart failure versus COVID-pneumonia.  Lasix dose was increased yesterday, creatinine came down.  He might need balloon aortic valvuloplasty Monday if fail to improve.  Obtain proBNP.  We will add metolazone.  If respiratory status continues to worsen, will likely need a right heart catheterization for better assessment of hemodynamics.    Discussed with primary physician and bedside nursing.    Do not hesitate to call me with questions regarding the patient care.    Vic Escobar  Interventional cardiologist  Cell: [phone]

## 2022-09-24 NOTE — PROGRESS NOTES
Hospital Medicine Daily Progress Note    Date of Service  9/24/2022    Chief Complaint  Ceferino Fitch is a 72 y.o. male admitted 9/21/2022 with elevated troponin     Hospital Course  This is a a 72 y.o. 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    Patient initially presented to OSH and was transferred here for high level of care.  Cardiology has been consulted and patient admitted for further work up and treatment     Interval Problem Update  9/23: Patient seen and examined, afebrile,  still feeling SOB, denies any chest pain,. No nausea or vomiting.   Echo showing EF of 20 % and also severe aortic stenosis  Cardiology following, cont diuresis appreciate rec. Also CTS has been consulted   Regarding his covid infection cont on dexamethasone   9/24: Patient resting in bed, his oxygen requriment have trended up and is now on norebreather and will probably transition to high flow . Due to a combination of covid and also his HF and severe aortic stenosis. Cont on diuresis   Cardiology following appreciate rec.  Regarding his covid cont on dexamethasone . Case discussed with infection disease and he has been started on remdesivir     I have discussed this patient's plan of care and discharge plan at IDT rounds today with Case Management, Nursing, Nursing leadership, and other members of the IDT team.    Consultants/Specialty  cardiology    Code Status  Full Code    Disposition  Patient is not medically cleared for discharge.   Anticipate discharge to to home with close outpatient follow-up.  I have placed the appropriate orders for post-discharge needs.    Review of Systems  Review of Systems   Constitutional:  Positive for malaise/fatigue. Negative for fever.   HENT:  Negative for hearing loss.    Eyes:  Negative for  blurred vision.   Respiratory:  Positive for cough. Negative for shortness of breath.    Cardiovascular:  Negative for chest pain.   Gastrointestinal:  Negative for nausea and vomiting.   Genitourinary:  Negative for dysuria.   Musculoskeletal:  Negative for myalgias.   Skin:  Negative for rash.   Neurological:  Negative for dizziness.   Endo/Heme/Allergies:  Does not bruise/bleed easily.      Physical Exam  Temp:  [35.9 °C (96.6 °F)-36.4 °C (97.5 °F)] 36.4 °C (97.5 °F)  Pulse:  [74-83] 75  Resp:  [17-20] 18  BP: ()/(42-58) 117/58  SpO2:  [88 %-96 %] 96 %    Physical Exam  Constitutional:       General: He is not in acute distress.  HENT:      Head: Normocephalic and atraumatic.      Right Ear: Tympanic membrane normal.      Left Ear: Tympanic membrane normal.      Nose: Nose normal.      Mouth/Throat:      Mouth: Mucous membranes are dry.   Eyes:      Extraocular Movements: Extraocular movements intact.      Pupils: Pupils are equal, round, and reactive to light.   Cardiovascular:      Rate and Rhythm: Normal rate and regular rhythm.      Pulses: Normal pulses.   Pulmonary:      Effort: Pulmonary effort is normal. No respiratory distress.   Abdominal:      General: Abdomen is flat. There is no distension.      Tenderness: There is no abdominal tenderness. There is no guarding or rebound.   Musculoskeletal:      Cervical back: Normal range of motion.      Right lower leg: No edema.      Left lower leg: No edema.   Skin:     General: Skin is warm.   Neurological:      General: No focal deficit present.      Mental Status: He is alert and oriented to person, place, and time. Mental status is at baseline.   Psychiatric:         Mood and Affect: Mood normal.       Fluids    Intake/Output Summary (Last 24 hours) at 9/24/2022 1446  Last data filed at 9/24/2022 1400  Gross per 24 hour   Intake 360 ml   Output 1300 ml   Net -940 ml       Laboratory  Recent Labs     09/22/22  1400 09/23/22  0329 09/24/22  0514   WBC  2.1* 3.2* 3.2*   RBC 2.96* 2.82* 2.51*   HEMOGLOBIN 9.5* 9.0* 7.9*   HEMATOCRIT 26.4* 25.4* 22.5*   MCV 89.2 90.1 89.6   MCH 32.1 31.9 31.5   MCHC 36.0* 35.4* 35.1   RDW 47.1 46.5 46.6   PLATELETCT 93* 89* 72*   MPV 11.5 11.5 10.3     Recent Labs     09/22/22  1400 09/23/22  0329 09/24/22  0514   SODIUM 134* 132* 137   POTASSIUM 3.7 3.8 4.2   CHLORIDE 103 105 108   CO2 15* 15* 17*   GLUCOSE 166* 197* 119*   BUN 43* 49* 56*   CREATININE 2.10* 2.34* 2.19*   CALCIUM 7.6* 7.3* 7.5*     Recent Labs     09/21/22  1830   APTT 34.4   INR 1.43*                 Imaging  DX-CHEST-PORTABLE (1 VIEW)   Final Result      1.  Right-sided PICC line unchanged in position.      2.  Increased interstitial markings throughout the lung fields especially in the perihilar regions consistent with pulmonary edema and/or pneumonitis.      DX-CHEST-PORTABLE (1 VIEW)   Final Result      Unchanged BILATERAL pneumonia      EC-ECHOCARDIOGRAM COMPLETE W/O CONT   Final Result      DX-CHEST-LIMITED (1 VIEW)   Final Result      1.  Multifocal pneumonia. Pulmonary edema could have a similar appearance.      US-RUQ   Final Result      Unremarkable RIGHT upper quadrant ultrasound      CT-TAVR CHEST-ABD-PELVIS W/WO POST PROCESS    (Results Pending)        Assessment/Plan  * NSTEMI (non-ST elevated myocardial infarction) (HCC)- (present on admission)  Assessment & Plan    Echo showing ef of 20% also severer Aortic stenosis  Cardiology following and working him up for possible TAVR   Ion the mean time cont with IV lasix for diuresis   Also CTS has been consulted     Advance care planning  Assessment & Plan  I had a prolonged discussion with the patient regarding goals of care, diagnoses, prognosis, and CODE STATUS. We discussed his   prognosis and comorbidities. I spent  16  minutes on advanced care planning in addition to the time spent managing the other medical problems.    He requested to remain full code      Elevated liver enzymes  Assessment &  Plan  Check hepatitis panel  Continue monitor  Get right upper quadrant ultrasound      Elevated brain natriuretic peptide (BNP) level  Assessment & Plan  Likely in setting of COVID-19 infection  Euvolemic on exam  Echo ordered    CKD (chronic kidney disease)  Assessment & Plan  Renal function remained stable compared to his labs  Continue to monitor  Avoid nephrotoxin    Pancytopenia (HCC)  Assessment & Plan  In setting of immunotherapy    Hypokalemia  Assessment & Plan  Started on oral kcl   Check magnesium  Monitor BMP closely     Low bicarbonate  Assessment & Plan  In setting of CKD  Started on oral sodium bicarbonate    COVID  Assessment & Plan    Activate sepsis protocol  COVID Isolation  NC 2 LPM keep O2 Sat >92%currently on nasal cannula  Continue on Decadron  Monitor oxygen saturation closely      Acute respiratory failure with hypoxia (HCC)  Assessment & Plan  Respiratory failure in setting of covid -19 vs ? 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    last immunotherapy infusion to treat his cancer in [place] on 09/09/2022  Follow-up with outpatient       VTE prophylaxis: heparin ppx    I have performed a physical exam and reviewed and updated ROS and Plan today (9/24/2022). In review of yesterday's note (9/23/2022), there are no changes except as documented above.

## 2022-09-24 NOTE — CARE PLAN
The patient is Unstable - High likelihood or risk of patient condition declining or worsening    Shift Goals  Clinical Goals: monitor oxygen saturations, monitor VS  Patient Goals: rest  Family Goals: patient saftey, adequate oxygenation    Progress made toward(s) clinical / shift goals:    Problem: Knowledge Deficit - Standard  Goal: Patient and family/care givers will demonstrate understanding of plan of care, disease process/condition, diagnostic tests and medications  Outcome: Progressing     Problem: Hemodynamics  Goal: Patient's hemodynamics, fluid balance and neurologic status will be stable or improve  Outcome: Progressing     Problem: Fluid Volume  Goal: Fluid volume balance will be maintained  Outcome: Progressing     Problem: Self Care  Goal: Patient will have the ability to perform ADLs independently or with assistance (bathe, groom, dress, toilet and feed)  Outcome: Progressing     Problem: Fall Risk  Goal: Patient will remain free from falls  Outcome: Progressing     Problem: Infection - Standard  Goal: Patient will remain free from infection  Outcome: Not Progressing       Patient is not progressing towards the following goals:      Problem: Infection - Standard  Goal: Patient will remain free from infection  Outcome: Not Progressing

## 2022-09-24 NOTE — PROGRESS NOTES
Assumed care of PT A&O 4. Pt resting in bed with no signs of labored breathing. Tele monitor in place, cardiac rhythm being monitored. Call light within reach, bed in lowest position, upper bed rails up. Pt was updated on plan of care for the day.

## 2022-09-24 NOTE — PROGRESS NOTES
Brief ID note:    -Remdesivir approval requested by hospitalist, Dr. Villarreal  -Chart reviewed.  Patient admitted with COVID-19 pneumonia with progressive oxygen requirements, currently on 15 L nasal cannula  -Patient is already on dexamethasone, prophylactic heparin  -Remdesivir approved. Will initiate a 5-day course of IV remdesivir  -Monitor daily CMP. Discontinue remdesivir if ALT >500 or if creatinine clearance <30 or if oxygen requirements greater than 15 L, if immunocompetent.  -Continue management per hospitalist teams. Please call us back if formal consult requested

## 2022-09-24 NOTE — CARE PLAN
Problem: Hemodynamics  Goal: Patient's hemodynamics, fluid balance and neurologic status will be stable or improve  Outcome: Progressing     Problem: Fluid Volume  Goal: Fluid volume balance will be maintained  Outcome: Progressing     Problem: Self Care  Goal: Patient will have the ability to perform ADLs independently or with assistance (bathe, groom, dress, toilet and feed)  Outcome: Progressing   The patient is Stable - Low risk of patient condition declining or worsening    Shift Goals  Clinical Goals: maintain O2 saturation, monitor VS  Patient Goals: rest  Family Goals: KARL    Progress made toward(s) clinical / shift goals:  Pt educated on POC, all questions answered in regards to disease process, treatment and diet. Pt verbalized understanding and voice no further questions at this time.

## 2022-09-24 NOTE — CARE PLAN
The patient is Stable - Low risk of patient condition declining or worsening    Shift Goals  Clinical Goals: Diagnostic imaging, maintain resp stability, comfort, safety  Patient Goals: hemodynamically stable  Family Goals: KARL    Progress made toward(s) clinical / shift goals:    Problem: Knowledge Deficit - Standard  Goal: Patient and family/care givers will demonstrate understanding of plan of care, disease process/condition, diagnostic tests and medications  Outcome: Progressing       Patient is not progressing towards the following goals:

## 2022-09-24 NOTE — PROGRESS NOTES
Report received from day shift RN, assumed care of pt. Pt A&Ox4. Plan of care discussed with pt, labs and chart reviewed. All needs met at this time. Tele box on. On 3.5L O2 via nasal canula. Call light within reach, bed locked and in lowest position. All fall precautions and hourly rounding in place.

## 2022-09-25 ENCOUNTER — APPOINTMENT (OUTPATIENT)
Dept: RADIOLOGY | Facility: MEDICAL CENTER | Age: 72
DRG: 177 | End: 2022-09-25
Attending: INTERNAL MEDICINE
Payer: MEDICARE

## 2022-09-25 LAB
ALBUMIN SERPL BCP-MCNC: 2.8 G/DL (ref 3.2–4.9)
ALBUMIN/GLOB SERPL: 1.3 G/DL
ALP SERPL-CCNC: 104 U/L (ref 30–99)
ALT SERPL-CCNC: 105 U/L (ref 2–50)
ANION GAP SERPL CALC-SCNC: 17 MMOL/L (ref 7–16)
AST SERPL-CCNC: 98 U/L (ref 12–45)
BILIRUB SERPL-MCNC: 0.6 MG/DL (ref 0.1–1.5)
BUN SERPL-MCNC: 63 MG/DL (ref 8–22)
CALCIUM SERPL-MCNC: 8.2 MG/DL (ref 8.5–10.5)
CHLORIDE SERPL-SCNC: 102 MMOL/L (ref 96–112)
CO2 SERPL-SCNC: 19 MMOL/L (ref 20–33)
CREAT SERPL-MCNC: 2.09 MG/DL (ref 0.5–1.4)
ERYTHROCYTE [DISTWIDTH] IN BLOOD BY AUTOMATED COUNT: 47.3 FL (ref 35.9–50)
GFR SERPLBLD CREATININE-BSD FMLA CKD-EPI: 33 ML/MIN/1.73 M 2
GLOBULIN SER CALC-MCNC: 2.2 G/DL (ref 1.9–3.5)
GLUCOSE SERPL-MCNC: 107 MG/DL (ref 65–99)
HCT VFR BLD AUTO: 26.2 % (ref 42–52)
HGB BLD-MCNC: 9.2 G/DL (ref 14–18)
MCH RBC QN AUTO: 31.7 PG (ref 27–33)
MCHC RBC AUTO-ENTMCNC: 35.1 G/DL (ref 33.7–35.3)
MCV RBC AUTO: 90.3 FL (ref 81.4–97.8)
PLATELET # BLD AUTO: 99 K/UL (ref 164–446)
PMV BLD AUTO: 11 FL (ref 9–12.9)
POTASSIUM SERPL-SCNC: 3.5 MMOL/L (ref 3.6–5.5)
PROT SERPL-MCNC: 5 G/DL (ref 6–8.2)
RBC # BLD AUTO: 2.9 M/UL (ref 4.7–6.1)
SODIUM SERPL-SCNC: 138 MMOL/L (ref 135–145)
WBC # BLD AUTO: 5.3 K/UL (ref 4.8–10.8)

## 2022-09-25 PROCEDURE — 93503 INSERT/PLACE HEART CATHETER: CPT

## 2022-09-25 PROCEDURE — 700111 HCHG RX REV CODE 636 W/ 250 OVERRIDE (IP): Mod: TB | Performed by: INTERNAL MEDICINE

## 2022-09-25 PROCEDURE — A9270 NON-COVERED ITEM OR SERVICE: HCPCS | Performed by: INTERNAL MEDICINE

## 2022-09-25 PROCEDURE — A9270 NON-COVERED ITEM OR SERVICE: HCPCS | Performed by: STUDENT IN AN ORGANIZED HEALTH CARE EDUCATION/TRAINING PROGRAM

## 2022-09-25 PROCEDURE — 93503 INSERT/PLACE HEART CATHETER: CPT | Performed by: INTERNAL MEDICINE

## 2022-09-25 PROCEDURE — 94640 AIRWAY INHALATION TREATMENT: CPT

## 2022-09-25 PROCEDURE — C1894 INTRO/SHEATH, NON-LASER: HCPCS

## 2022-09-25 PROCEDURE — 700102 HCHG RX REV CODE 250 W/ 637 OVERRIDE(OP): Performed by: INTERNAL MEDICINE

## 2022-09-25 PROCEDURE — 700105 HCHG RX REV CODE 258: Performed by: INTERNAL MEDICINE

## 2022-09-25 PROCEDURE — 36556 INSERT NON-TUNNEL CV CATH: CPT

## 2022-09-25 PROCEDURE — 02HV33Z INSERTION OF INFUSION DEVICE INTO SUPERIOR VENA CAVA, PERCUTANEOUS APPROACH: ICD-10-PCS | Performed by: INTERNAL MEDICINE

## 2022-09-25 PROCEDURE — 85027 COMPLETE CBC AUTOMATED: CPT

## 2022-09-25 PROCEDURE — 80053 COMPREHEN METABOLIC PANEL: CPT

## 2022-09-25 PROCEDURE — 93451 RIGHT HEART CATH: CPT

## 2022-09-25 PROCEDURE — 99221 1ST HOSP IP/OBS SF/LOW 40: CPT | Performed by: THORACIC SURGERY (CARDIOTHORACIC VASCULAR SURGERY)

## 2022-09-25 PROCEDURE — 4A023N6 MEASUREMENT OF CARDIAC SAMPLING AND PRESSURE, RIGHT HEART, PERCUTANEOUS APPROACH: ICD-10-PCS | Performed by: INTERNAL MEDICINE

## 2022-09-25 PROCEDURE — 99292 CRITICAL CARE ADDL 30 MIN: CPT | Mod: 25 | Performed by: INTERNAL MEDICINE

## 2022-09-25 PROCEDURE — 71045 X-RAY EXAM CHEST 1 VIEW: CPT

## 2022-09-25 PROCEDURE — 770022 HCHG ROOM/CARE - ICU (200)

## 2022-09-25 PROCEDURE — 99291 CRITICAL CARE FIRST HOUR: CPT | Mod: 25 | Performed by: INTERNAL MEDICINE

## 2022-09-25 PROCEDURE — 99291 CRITICAL CARE FIRST HOUR: CPT | Performed by: INTERNAL MEDICINE

## 2022-09-25 PROCEDURE — 02HQ32Z INSERTION OF MONITORING DEVICE INTO RIGHT PULMONARY ARTERY, PERCUTANEOUS APPROACH: ICD-10-PCS | Performed by: INTERNAL MEDICINE

## 2022-09-25 PROCEDURE — 700111 HCHG RX REV CODE 636 W/ 250 OVERRIDE (IP): Performed by: INTERNAL MEDICINE

## 2022-09-25 PROCEDURE — 700102 HCHG RX REV CODE 250 W/ 637 OVERRIDE(OP): Performed by: STUDENT IN AN ORGANIZED HEALTH CARE EDUCATION/TRAINING PROGRAM

## 2022-09-25 PROCEDURE — 99233 SBSQ HOSP IP/OBS HIGH 50: CPT | Performed by: INTERNAL MEDICINE

## 2022-09-25 RX ORDER — FUROSEMIDE 10 MG/ML
40 INJECTION INTRAMUSCULAR; INTRAVENOUS
Status: DISCONTINUED | OUTPATIENT
Start: 2022-09-26 | End: 2022-09-26

## 2022-09-25 RX ADMIN — DEXAMETHASONE 6 MG: 4 TABLET ORAL at 06:28

## 2022-09-25 RX ADMIN — HEPARIN SODIUM 5000 UNITS: 5000 INJECTION INTRAVENOUS; SUBCUTANEOUS at 06:27

## 2022-09-25 RX ADMIN — DOXYCYCLINE 100 MG: 100 TABLET, FILM COATED ORAL at 18:00

## 2022-09-25 RX ADMIN — METOLAZONE 2.5 MG: 5 TABLET ORAL at 06:27

## 2022-09-25 RX ADMIN — ASPIRIN 81 MG: 81 TABLET, COATED ORAL at 06:27

## 2022-09-25 RX ADMIN — DOXYCYCLINE 100 MG: 100 TABLET, FILM COATED ORAL at 06:27

## 2022-09-25 RX ADMIN — FUROSEMIDE 60 MG: 10 INJECTION, SOLUTION INTRAMUSCULAR; INTRAVENOUS at 06:28

## 2022-09-25 RX ADMIN — REMDESIVIR 100 MG: 100 INJECTION, POWDER, LYOPHILIZED, FOR SOLUTION INTRAVENOUS at 19:33

## 2022-09-25 RX ADMIN — HEPARIN SODIUM 5000 UNITS: 5000 INJECTION INTRAVENOUS; SUBCUTANEOUS at 21:15

## 2022-09-25 RX ADMIN — HEPARIN SODIUM 5000 UNITS: 5000 INJECTION INTRAVENOUS; SUBCUTANEOUS at 13:48

## 2022-09-25 ASSESSMENT — ENCOUNTER SYMPTOMS
BRUISES/BLEEDS EASILY: 0
DIARRHEA: 1
ABDOMINAL PAIN: 0
PALPITATIONS: 0
BLURRED VISION: 0
SHORTNESS OF BREATH: 0
HEMOPTYSIS: 0
BACK PAIN: 0
EYES NEGATIVE: 1
VOMITING: 0
CHILLS: 0
FOCAL WEAKNESS: 0
SPUTUM PRODUCTION: 0
HEADACHES: 0
PND: 0
SINUS PAIN: 0
NERVOUS/ANXIOUS: 0
SORE THROAT: 0
MYALGIAS: 0
FEVER: 0
DIZZINESS: 0
COUGH: 1
NAUSEA: 0

## 2022-09-25 ASSESSMENT — FIBROSIS 4 INDEX: FIB4 SCORE: 7.05

## 2022-09-25 NOTE — CARE PLAN
Problem: Hemodynamics  Goal: Patient's hemodynamics, fluid balance and neurologic status will be stable or improve  Outcome: Progressing     Problem: Fluid Volume  Goal: Fluid volume balance will be maintained  Outcome: Progressing     Problem: Infection - Standard  Goal: Patient will remain free from infection  Outcome: Progressing   The patient is Stable - Low risk of patient condition declining or worsening    Shift Goals  Clinical Goals: monitor oxygen saturation, monitor VS  Patient Goals: rest  Family Goals: patient saftey, adequate oxygenation    Progress made toward(s) clinical / shift goals:  Pt on isolation precautions, signs outside door and PPE available. Pt states understanding of isolation precautions.

## 2022-09-25 NOTE — CONSULTS
Critical Care Consultation    Date of consult: 9/25/2022    Referring Physician  Rigo Foreman M.D.    Reason for Consultation  Respiratory failure, eval for BiPAP    History of Presenting Illness  72 y.o. male w/PMHx COVID 7/2022, recurrent NHL with last therapy 9/9/22 ([place] - alkylating agent and Obinutuzumab - anti CD20, CKD, HTN, COVID again Dx initially with Ag test & PCR 9/13 who presented 9/21/2022 with NSTEMI and worsening COVID symptoms. ECHO 9/20 revealed EF 20% with inf & inferolateral hypokinesis.  Severe AS suspected by imaging as well and cardiology has been evaluating along with CVS.  He was moved today by Cardiology for diagnostic PA catheter placement.  He as been treated with decadron, doxycycline, lasix and ID eval added remdesevir.  All cultures and f/u viral studies have been negative.  Moved to [facility] for R heart cath, eval of hemodynamics.  [facility]S evaluating for TAVR.  Patient seen in [facility] and feels better today.  Oxygenation worsened since admit an on 100%&/60L HFNC but sats now % and O2 weaning.  Still has NP cough and loss of sense of taste, Speaking in full sentences and no issues with WOB.  Afebrile with normalizing WBC, had been low.  Initial PA cath hemodynamics:  PCWP 6, CO 3.9 and CI 2.12.  CXR today with improvement in bilateral alveolar opacities.    Code Status  Full Code    Review of Systems  Review of Systems   Constitutional:  Positive for malaise/fatigue (better). Negative for chills and fever.   HENT:  Negative for congestion, sinus pain and sore throat.         Lost sense of taste   Eyes: Negative.    Respiratory:  Positive for cough. Negative for hemoptysis, sputum production and shortness of breath.    Cardiovascular:  Negative for chest pain, palpitations, leg swelling and PND.   Gastrointestinal:  Positive for diarrhea (< once/day). Negative for abdominal pain, nausea and vomiting.   Genitourinary:  Negative for dysuria, frequency and hematuria.   Musculoskeletal:   "Negative for back pain and myalgias.   Neurological:  Negative for focal weakness and headaches.   Endo/Heme/Allergies:  Does not bruise/bleed easily.   Psychiatric/Behavioral:  The patient is not nervous/anxious.      Past Medical History   has a past medical history of Cancer (HCC), Follicular lymphoma (HCC), and Hypertension.    Surgical History   has no past surgical history on file.    Family History  family history is not on file.    Social History   reports that he has quit smoking. He has been exposed to tobacco smoke. He has never used smokeless tobacco. He reports that he does not currently use alcohol after a past usage of about 3.0 oz per week. He reports that he does not use drugs.    Medications  Home Medications       Reviewed by Elle Bah (Pharmacy Tech) on 09/21/22 at 1736  Med List Status: Complete     Medication Last Dose Status   acetaminophen (TYLENOL) 325 MG Tab \"Few days ago\" Active   acyclovir (ZOVIRAX) 400 MG tablet 9/21/2022 Active   allopurinol (ZYLOPRIM) 100 MG Tab 9/21/2022 Active   aspirin 81 MG EC tablet 9/21/2022 Active   Cyanocobalamin 2000 MCG Tab 9/20/2022 Active   rosuvastatin (CRESTOR) 20 MG Tab 9/20/2022 Active   vitamin D (VITAMIND D3) 1000 UNIT Tab 9/20/2022 Active                  Current Facility-Administered Medications   Medication Dose Route Frequency Provider Last Rate Last Admin    [START ON 9/26/2022] furosemide (LASIX) injection 40 mg  40 mg Intravenous Q DAY Vic Escobar M.D.        remdesivir (Veklury) 100 mg in  mL IVPB  100 mg Intravenous DAILY AT 1800 Krystal Butler M.D.        aspirin EC (ECOTRIN) tablet 81 mg  81 mg Oral DAILY Jennifer Villarreal M.D.   81 mg at 09/25/22 0627    heparin injection 5,000 Units  5,000 Units Subcutaneous Q8HRS Jennifer Villarreal M.D.   5,000 Units at 09/25/22 0627    dexamethasone (DECADRON) tablet 6 mg  6 mg Oral DAILY Judah Chen M.D.   6 mg at 09/25/22 0628    doxycycline monohydrate (ADOXA) tablet 100 mg  100 mg " Oral Q12HRS Judah Chen M.D.   100 mg at 09/25/22 0627       Allergies  Allergies   Allergen Reactions    Penicillins Rash and Unspecified     Hoarseness         Vital Signs last 24 hours  Temp:  [35.7 °C (96.3 °F)-36.8 °C (98.2 °F)] 35.7 °C (96.3 °F)  Pulse:  [65-85] 85  Resp:  [17-24] 23  BP: ()/(41-70) 157/70  SpO2:  [84 %-100 %] 96 %    Physical Exam  Physical Exam  Vitals reviewed.   Constitutional:       Appearance: He is normal weight. He is not ill-appearing, toxic-appearing or diaphoretic.      Comments: HFNC   HENT:      Head: Normocephalic and atraumatic.      Mouth/Throat:      Mouth: Mucous membranes are dry.   Eyes:      General: No scleral icterus.     Extraocular Movements: Extraocular movements intact.      Pupils: Pupils are equal, round, and reactive to light.   Neck:      Vascular: No JVD.      Comments: R IJ MAC cordis with PA catheter  Cardiovascular:      Rate and Rhythm: Normal rate and regular rhythm.      Pulses: Normal pulses.      Heart sounds: Murmur heard.     No gallop.      Comments: SR  Pulmonary:      Effort: Pulmonary effort is normal.      Breath sounds: Rales present. No wheezing or rhonchi.   Abdominal:      General: Abdomen is flat. Bowel sounds are normal. There is no distension.      Palpations: Abdomen is soft. There is no fluid wave, hepatomegaly or mass.      Tenderness: There is no abdominal tenderness. There is no right CVA tenderness, left CVA tenderness or guarding.   Musculoskeletal:      Cervical back: Neck supple. No rigidity.      Right lower leg: No edema.      Left lower leg: No edema.   Lymphadenopathy:      Cervical: No cervical adenopathy.   Skin:     General: Skin is warm and dry.      Capillary Refill: Capillary refill takes less than 2 seconds.   Neurological:      General: No focal deficit present.      Mental Status: He is alert and oriented to person, place, and time. Mental status is at baseline.   Psychiatric:         Mood and Affect: Mood  normal.         Behavior: Behavior normal. Behavior is cooperative.         Thought Content: Thought content normal.       Fluids    Intake/Output Summary (Last 24 hours) at 9/25/2022 1340  Last data filed at 9/25/2022 1200  Gross per 24 hour   Intake 0 ml   Output 3258 ml   Net -3258 ml       Laboratory  Recent Results (from the past 48 hour(s))   CBC WITHOUT DIFFERENTIAL    Collection Time: 09/24/22  5:14 AM   Result Value Ref Range    WBC 3.2 (L) 4.8 - 10.8 K/uL    RBC 2.51 (L) 4.70 - 6.10 M/uL    Hemoglobin 7.9 (L) 14.0 - 18.0 g/dL    Hematocrit 22.5 (L) 42.0 - 52.0 %    MCV 89.6 81.4 - 97.8 fL    MCH 31.5 27.0 - 33.0 pg    MCHC 35.1 33.7 - 35.3 g/dL    RDW 46.6 35.9 - 50.0 fL    Platelet Count 72 (L) 164 - 446 K/uL    MPV 10.3 9.0 - 12.9 fL   Basic Metabolic Panel    Collection Time: 09/24/22  5:14 AM   Result Value Ref Range    Sodium 137 135 - 145 mmol/L    Potassium 4.2 3.6 - 5.5 mmol/L    Chloride 108 96 - 112 mmol/L    Co2 17 (L) 20 - 33 mmol/L    Glucose 119 (H) 65 - 99 mg/dL    Bun 56 (H) 8 - 22 mg/dL    Creatinine 2.19 (H) 0.50 - 1.40 mg/dL    Calcium 7.5 (L) 8.5 - 10.5 mg/dL    Anion Gap 12.0 7.0 - 16.0   ESTIMATED GFR    Collection Time: 09/24/22  5:14 AM   Result Value Ref Range    GFR (CKD-EPI) 31 (A) >60 mL/min/1.73 m 2   D-DIMER    Collection Time: 09/24/22  9:49 AM   Result Value Ref Range    D-Dimer Screen 3.89 (H) 0.00 - 0.50 ug/mL (FEU)   ABG - LAB    Collection Time: 09/24/22  1:36 PM   Result Value Ref Range    Ph 7.44 7.40 - 7.50    Pco2 23.8 (L) 26.0 - 37.0 mmHg    Po2 69.9 64.0 - 87.0 mmHg    O2 Saturation 91.9 (L) 93.0 - 99.0 %    Hco3 16 (L) 17 - 25 mmol/L    Base Excess -7 (L) -4 - 3 mmol/L    Body Temp 36.4 Centigrade    FIO2 96 (H) 30 - 60 %    Ph -TC 7.45 7.40 - 7.50    Pco2 -TC 23.2 (L) 26.0 - 37.0 mmHg    Po2 -TC 67.1 64.0 - 87.0 mmHg   CBC WITHOUT DIFFERENTIAL    Collection Time: 09/25/22  9:08 AM   Result Value Ref Range    WBC 5.3 4.8 - 10.8 K/uL    RBC 2.90 (L) 4.70 - 6.10  M/uL    Hemoglobin 9.2 (L) 14.0 - 18.0 g/dL    Hematocrit 26.2 (L) 42.0 - 52.0 %    MCV 90.3 81.4 - 97.8 fL    MCH 31.7 27.0 - 33.0 pg    MCHC 35.1 33.7 - 35.3 g/dL    RDW 47.3 35.9 - 50.0 fL    Platelet Count 99 (L) 164 - 446 K/uL    MPV 11.0 9.0 - 12.9 fL   Comp Metabolic Panel    Collection Time: 09/25/22  9:08 AM   Result Value Ref Range    Sodium 138 135 - 145 mmol/L    Potassium 3.5 (L) 3.6 - 5.5 mmol/L    Chloride 102 96 - 112 mmol/L    Co2 19 (L) 20 - 33 mmol/L    Anion Gap 17.0 (H) 7.0 - 16.0    Glucose 107 (H) 65 - 99 mg/dL    Bun 63 (H) 8 - 22 mg/dL    Creatinine 2.09 (H) 0.50 - 1.40 mg/dL    Calcium 8.2 (L) 8.5 - 10.5 mg/dL    AST(SGOT) 98 (H) 12 - 45 U/L    ALT(SGPT) 105 (H) 2 - 50 U/L    Alkaline Phosphatase 104 (H) 30 - 99 U/L    Total Bilirubin 0.6 0.1 - 1.5 mg/dL    Albumin 2.8 (L) 3.2 - 4.9 g/dL    Total Protein 5.0 (L) 6.0 - 8.2 g/dL    Globulin 2.2 1.9 - 3.5 g/dL    A-G Ratio 1.3 g/dL   ESTIMATED GFR    Collection Time: 09/25/22  9:08 AM   Result Value Ref Range    GFR (CKD-EPI) 33 (A) >60 mL/min/1.73 m 2       Imaging  DX-CHEST-PORTABLE (1 VIEW)   Final Result      1.  Right-sided [place]-Frannie catheter tip projects in the distal right main pulmonary artery region. No pneumothorax.   2.  Probable slight improvement in hazy bilateral pulmonary opacities.      DX-CHEST-PORTABLE (1 VIEW)   Final Result      1.  Right-sided PICC line unchanged in position.      2.  Increased interstitial markings throughout the lung fields especially in the perihilar regions consistent with pulmonary edema and/or pneumonitis.      DX-CHEST-PORTABLE (1 VIEW)   Final Result      Unchanged BILATERAL pneumonia      EC-ECHOCARDIOGRAM COMPLETE W/O CONT   Final Result      DX-CHEST-LIMITED (1 VIEW)   Final Result      1.  Multifocal pneumonia. Pulmonary edema could have a similar appearance.      US-RUQ   Final Result      Unremarkable RIGHT upper quadrant ultrasound      CT-TAVR CHEST-ABD-PELVIS W/WO POST PROCESS    (Results  Pending)       Assessment/Plan  * Acute respiratory failure with hypoxia (HCC)  Assessment & Plan  Secondary to COVID  RT protocols  HFNC O2 wean as tolerated  Clinically better per patient - WOB ZERO  CXR better bilat opacites today  Dry on exam and by PA cath, Cards reducing lasix  Decadron/RDV for Covid  Doxycycline empiriaclly for elevated ProCal  IS/Mobilize  Self prone  If develops secondary infection may need BAL    COVID  Assessment & Plan  Recurrent problem  1st diagnosis + PCR 7/23/22, clinically improved -> chemo 9/9/22  Recurrent S/s + AG & PCR 9/13/22    Triple isolation  Decadron  RDV per ID  Empiric Doxycycline  Recheck D-dimer, CRP, BNP and Procal in AM    NSTEMI (non-ST elevated myocardial infarction) (HCC)- (present on admission)  Assessment & Plan  Ongoing eval by cardiology  Focal wall motion abn on ECHO - EF 20%  Hemodynamics ok today by PA cath  Related to COVID?  Not a great operative candidate  Statin  SQ heparin  ASA  BB/ARB as per Cards    AS?  TAVR per [facility] if eval shows need, no surgery per patient    CKD (chronic kidney disease)  Assessment & Plan  Mildly above basline 1.6-1.9 range  Improved overnight  PA cath suggest reduce diuresis, may improve with lower dose lasix ordered by Cards  Avoid nephrotoxins  Renal U/s ok, no hydronephrosis  Renal dose meds    Pancytopenia (HCC)  Assessment & Plan  Imporved  H/o recurrent lymphoma  Recent [place] - alkylating agent - infusion 9/9/22  Recent OBINUTUZUMAB - anti CD20 - infusion 9/9/22    Follicular lymphoma grade I (HCC)- (present on admission)  Assessment & Plan  Recurrent lymphoma - IMMUNOCOMPROMISED  Rx [facility] in [place]  Last chemo/immunotherapy 9/9  [place] (Alkylating agent) and Obinutuzumab (anti - CD20)  Pancytopenia on admit - improving  Pulmonary process likely COVID and not secondary to chemo/MAB directly    Advance care planning  Assessment & Plan  Full code at this time    Elevated liver enzymes  Assessment & Plan  Elevated  transaminases - improved  Avoid hepato-toxins  Serial CMP  ABD U/s ok    Hypokalemia  Assessment & Plan  replete      Discussed patient condition and risk of morbidity and/or mortality with Hospitalist, Family, RN, RT, Pharmacy, Patient, and cardiology.    The patient remains critically ill.  Critical care time = 55 minutes in directly providing and coordinating critical care and extensive data review.  No time overlap and excludes procedures.

## 2022-09-25 NOTE — PROCEDURES
Central line placement, right heart catheterization.    Due to cardiomyopathy, respiratory failure, pulmonary edema right heart catheterization was indicated to evaluate hemodynamic pressures.  Patient was transferred to ICU, right side of the neck prepped with chlorhexidine, alcohol pad.  Patient was sterilely draped.  Timeout was performed.  Under ultrasound guidance right internal jugular vein access was obtained, right internal jugular vein is small, with significant respiratory collapse.  A 9 Fr sheath with central line access was placed in right internal jugular vein.  This was secured with a suture, Biopatch was placed.  A 7.5 [place]-Frannie catheter was floated through the sheath into pulmonary artery, cardiac output, pulmonary pressure, wedge pressure, central venous pressure was obtained.    Cardiac output 4 L/min  Cardiac index 2.1 L/min/m²  Central venous pressure 1 mmHg  Pulmonary artery mean pressure 15 mmHg  Pulmonary capillary wedge pressure 6 mmHg  Systemic vascular resistance 1762 dynes*s/cm5  Pulmonary vascular resistance 184 dynes*s/cm5    [place]-Frannie catheter was secured.  A Tegaderm was placed to secure the sheath.    Patient tolerated the procedure well.  No complications.  Chest x-ray showed no pneumothorax, PA catheter in appropriate position.

## 2022-09-25 NOTE — ASSESSMENT & PLAN NOTE
Elevated transaminases - improved  Likely related to acute heart failure  Avoid hepato-toxins  Serial CMP  ABD U/s ok

## 2022-09-25 NOTE — ASSESSMENT & PLAN NOTE
Ongoing eval by cardiology  Focal wall motion abn on ECHO - EF 20%  Hemodynamics ok today by PA cath  Related to COVID?  Not a great operative candidate  Statin  SQ heparin  ASA  BB/ARB as per Cards    AS?  TAVR per [facility] if eval shows need, no surgery per patient

## 2022-09-25 NOTE — ASSESSMENT & PLAN NOTE
Recurrent problem  1st diagnosis + PCR 7/23/22, clinically improved -> chemo 9/9/22  Recurrent S/s + AG & PCR 9/13/22    Triple isolation  Decadron  RDV per ID  Empiric Doxycycline  Recheck D-dimer, CRP, BNP and Procal in AM

## 2022-09-25 NOTE — PROGRESS NOTES
Interval History:   Oxygen requirement significantly improved, chest x-ray looked worse yesterday with mild pulmonary edema.  Having good diuresis despite respiratory status worsening.    Physical Exam   Blood pressure (!) 157/70, pulse 85, temperature (!) 35.7 °C (96.3 °F), temperature source Temporal, resp. rate (!) 23, weight 66 kg (145 lb 8.1 oz), SpO2 96 %.    Constitutional: Mild distress  HENT: Normocephalic and atraumatic. No scleral icterus.   Neck: No JVD present.   Cardiovascular: Normal rate.  3 x 6 systolic murmur aortic area   pulmonary/Chest: Coarse breath sounds   abdominal: S/NT/ND BS+   Musculoskeletal: Exhibits no edema. Pulses present.  Skin: Skin is warm and dry.     ROS: As HPI other reviewed and negative       Intake/Output Summary (Last 24 hours) at 9/25/2022 1258  Last data filed at 9/25/2022 1200  Gross per 24 hour   Intake 0 ml   Output 3258 ml   Net -3258 ml       Recent Labs     09/23/22 0329 09/24/22  0514 09/25/22  0908   WBC 3.2* 3.2* 5.3   RBC 2.82* 2.51* 2.90*   HEMOGLOBIN 9.0* 7.9* 9.2*   HEMATOCRIT 25.4* 22.5* 26.2*   MCV 90.1 89.6 90.3   MCH 31.9 31.5 31.7   MCHC 35.4* 35.1 35.1   RDW 46.5 46.6 47.3   PLATELETCT 89* 72* 99*   MPV 11.5 10.3 11.0     Recent Labs     09/23/22 0329 09/24/22  0514 09/25/22  0908   SODIUM 132* 137 138   POTASSIUM 3.8 4.2 3.5*   CHLORIDE 105 108 102   CO2 15* 17* 19*   GLUCOSE 197* 119* 107*   BUN 49* 56* 63*   CREATININE 2.34* 2.19* 2.09*   CALCIUM 7.3* 7.5* 8.2*                             No current facility-administered medications on file prior to encounter.     Current Outpatient Medications on File Prior to Encounter   Medication Sig Dispense Refill    acetaminophen (TYLENOL) 325 MG Tab Take 325 mg by mouth one time as needed.      Cyanocobalamin 2000 MCG Tab Take 1 Tablet by mouth every day.      acyclovir (ZOVIRAX) 400 MG tablet Take 400 mg by mouth every day.      allopurinol (ZYLOPRIM) 100 MG Tab Take 100 mg by mouth every day.      aspirin  81 MG EC tablet Take 81 mg by mouth every day.      vitamin D (VITAMIND D3) 1000 UNIT Tab Take 2,000 Units by mouth every day.      rosuvastatin (CRESTOR) 20 MG Tab Take 20 mg by mouth every day.         Current Facility-Administered Medications   Medication Dose Frequency Provider Last Rate Last Admin    [START ON 9/26/2022] furosemide (LASIX) injection 40 mg  40 mg Q DAY Vic Escobar M.D.        remdesivir (Veklury) 100 mg in  mL IVPB  100 mg DAILY AT 1800 Krystal Butler M.D.        aspirin EC (ECOTRIN) tablet 81 mg  81 mg DAILY Jennifer Villarreal M.D.   81 mg at 09/25/22 0627    heparin injection 5,000 Units  5,000 Units Q8HRS Jennifer Villarreal M.D.   5,000 Units at 09/25/22 0627    dexamethasone (DECADRON) tablet 6 mg  6 mg DAILY Judah Chen M.D.   6 mg at 09/25/22 0628    doxycycline monohydrate (ADOXA) tablet 100 mg  100 mg Q12HRS Judah Chen M.D.   100 mg at 09/25/22 0627   Last reviewed on 9/21/2022  5:36 PM by Elle Bah   Medications reviewed    Imaging reviewed    Chest x-ray multifocal pneumonia versus pulmonary edema    Impressions:  72-year-old male patient with COVID pneumonia, severe low-flow low gradient aortic stenosis, cardiomyopathy    Recommendations:  Transferred to ICU for right heart catheterization.  See separate report.  Filling pressures are normal, cardiac output is low normal.  Aortic stenosis cardiomyopathy unlikely contributing to degree of pulmonary edema at this time.  Recommend infectious disease consultation, continue broad-spectrum antibiotics, consider atypical infections given immunosuppressive state, recent chemotherapy with lymphoma.  We will not be doing anything for his aortic valve during hospitalization due to normal filling pressures, he will need transcatheter aortic valve evaluation once he improves.    Patient is critically ill with respiratory failure, a total of 80 minutes of critical care time was spent not including procedures, no time  overlap.      Discussed with primary physician and bedside nursing.    Do not hesitate to call me with questions regarding the patient care.    Vic Escobar  Interventional cardiologist  Cell: [phone]

## 2022-09-25 NOTE — PROGRESS NOTES
Report received from day shift RN, assumed care of pt. Pt A&Ox4. Plan of care discussed with pt, labs and chart reviewed. All needs met at this time. Tele box on. On 30L at 50% O2 via high flow. Call light within reach, bed locked and in lowest position. All fall precautions and hourly rounding in place.

## 2022-09-25 NOTE — PROGRESS NOTES
Hospital Medicine Daily Progress Note    Date of Service  9/25/2022    Chief Complaint  Ceferino Fitch is a 72 y.o. male admitted 9/21/2022 with elevated troponin     Hospital Course  This is a a 72 y.o. 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    Patient initially presented to OSH and was transferred here for high level of care.  Cardiology has been consulted and patient admitted for further work up and treatment     Interval Problem Update  9/23: Patient seen and examined, afebrile,  still feeling SOB, denies any chest pain,. No nausea or vomiting.   Echo showing EF of 20 % and also severe aortic stenosis  Cardiology following, cont diuresis appreciate rec. Also CTS has been consulted   Regarding his covid infection cont on dexamethasone   9/24: Patient resting in bed, his oxygen requriment have trended up and is now on norebreather and will probably transition to high flow . Due to a combination of covid and also his HF and severe aortic stenosis. Cont on diuresis   Cardiology following appreciate rec.  Regarding his covid cont on dexamethasone . Case discussed with infection disease and he has been started on remdesivir   9/25: Patient oxygen requirement have gone from 15 L norebreathe now on high flow 50 L.   He is on redemsivir and dexamethasone for his covid.   Case discussed with cardiology and also critical care physician. Patient will need a swan cath as per cardiology rec.    Cardiology following regarding his severe AS and also HF   Patient will be transferred to ICU where a swan cath will be placed and if need ed can be started on BiPAP   Appreciate cardiology and critical care rec.   I have discussed this patient's plan of care and discharge plan at IDT rounds today with Case Management, Nursing,  Nursing leadership, and other members of the IDT team.    Consultants/Specialty  cardiology  Critical care   Code Status  Full Code    Disposition  Patient is not medically cleared for discharge.   Anticipate discharge to to home with close outpatient follow-up.  I have placed the appropriate orders for post-discharge needs.    Review of Systems  Review of Systems   Constitutional:  Positive for malaise/fatigue. Negative for fever.   HENT:  Negative for hearing loss.    Eyes:  Negative for blurred vision.   Respiratory:  Positive for cough. Negative for shortness of breath.    Cardiovascular:  Negative for chest pain.   Gastrointestinal:  Negative for nausea and vomiting.   Genitourinary:  Negative for dysuria.   Musculoskeletal:  Negative for myalgias.   Skin:  Negative for rash.   Neurological:  Negative for dizziness.   Endo/Heme/Allergies:  Does not bruise/bleed easily.      Physical Exam  Temp:  [35.7 °C (96.3 °F)-36.8 °C (98.2 °F)] 35.7 °C (96.3 °F)  Pulse:  [65-84] 84  Resp:  [17-19] 18  BP: ()/(41-64) 116/57  SpO2:  [84 %-96 %] 90 %    Physical Exam  Constitutional:       General: He is not in acute distress.  HENT:      Head: Normocephalic and atraumatic.      Right Ear: Tympanic membrane normal.      Left Ear: Tympanic membrane normal.      Nose: Nose normal.      Mouth/Throat:      Mouth: Mucous membranes are dry.   Eyes:      Extraocular Movements: Extraocular movements intact.      Pupils: Pupils are equal, round, and reactive to light.   Cardiovascular:      Rate and Rhythm: Normal rate and regular rhythm.      Pulses: Normal pulses.   Pulmonary:      Effort: Pulmonary effort is normal. No respiratory distress.   Abdominal:      General: Abdomen is flat. There is no distension.      Tenderness: There is no abdominal tenderness. There is no guarding or rebound.   Musculoskeletal:      Cervical back: Normal range of motion.      Right lower leg: No edema.      Left lower leg: No edema.   Skin:      General: Skin is warm.   Neurological:      General: No focal deficit present.      Mental Status: He is alert and oriented to person, place, and time. Mental status is at baseline.   Psychiatric:         Mood and Affect: Mood normal.       Fluids    Intake/Output Summary (Last 24 hours) at 9/25/2022 1118  Last data filed at 9/25/2022 0830  Gross per 24 hour   Intake --   Output 3208 ml   Net -3208 ml       Laboratory  Recent Labs     09/23/22 0329 09/24/22  0514 09/25/22  0908   WBC 3.2* 3.2* 5.3   RBC 2.82* 2.51* 2.90*   HEMOGLOBIN 9.0* 7.9* 9.2*   HEMATOCRIT 25.4* 22.5* 26.2*   MCV 90.1 89.6 90.3   MCH 31.9 31.5 31.7   MCHC 35.4* 35.1 35.1   RDW 46.5 46.6 47.3   PLATELETCT 89* 72* 99*   MPV 11.5 10.3 11.0     Recent Labs     09/23/22 0329 09/24/22  0514 09/25/22  0908   SODIUM 132* 137 138   POTASSIUM 3.8 4.2 3.5*   CHLORIDE 105 108 102   CO2 15* 17* 19*   GLUCOSE 197* 119* 107*   BUN 49* 56* 63*   CREATININE 2.34* 2.19* 2.09*   CALCIUM 7.3* 7.5* 8.2*                       Imaging  DX-CHEST-PORTABLE (1 VIEW)   Final Result      1.  Right-sided PICC line unchanged in position.      2.  Increased interstitial markings throughout the lung fields especially in the perihilar regions consistent with pulmonary edema and/or pneumonitis.      DX-CHEST-PORTABLE (1 VIEW)   Final Result      Unchanged BILATERAL pneumonia      EC-ECHOCARDIOGRAM COMPLETE W/O CONT   Final Result      DX-CHEST-LIMITED (1 VIEW)   Final Result      1.  Multifocal pneumonia. Pulmonary edema could have a similar appearance.      US-RUQ   Final Result      Unremarkable RIGHT upper quadrant ultrasound      CT-TAVR CHEST-ABD-PELVIS W/WO POST PROCESS    (Results Pending)        Assessment/Plan  * NSTEMI (non-ST elevated myocardial infarction) (HCC)- (present on admission)  Assessment & Plan    Echo showing ef of 20% also severer Aortic stenosis  Cardiology following and working him up for possible TAVR   Ion the mean time cont with IV lasix for  diuresis   Also CTS has been consulted     Advance care planning  Assessment & Plan  I had a prolonged discussion with the patient regarding goals of care, diagnoses, prognosis, and CODE STATUS. We discussed his   prognosis and comorbidities. I spent  16  minutes on advanced care planning in addition to the time spent managing the other medical problems.    He requested to remain full code      Elevated liver enzymes  Assessment & Plan  Check hepatitis panel  Continue monitor  Get right upper quadrant ultrasound      Elevated brain natriuretic peptide (BNP) level  Assessment & Plan  Likely in setting of COVID-19 infection  Euvolemic on exam  Echo ordered    CKD (chronic kidney disease)  Assessment & Plan  Renal function remained stable compared to his labs  Continue to monitor  Avoid nephrotoxin    Pancytopenia (HCC)  Assessment & Plan  In setting of immunotherapy    Hypokalemia  Assessment & Plan  Started on oral kcl   Check magnesium  Monitor BMP closely     Low bicarbonate  Assessment & Plan  In setting of CKD  Started on oral sodium bicarbonate    COVID  Assessment & Plan    Activate sepsis protocol  COVID Isolation  NC 2 LPM keep O2 Sat >92%currently on nasal cannula  Continue on Decadron  Monitor oxygen saturation closely      Acute respiratory failure with hypoxia (HCC)  Assessment & Plan  Respiratory failure in setting of covid -19 vs ? 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    last immunotherapy infusion to treat his  cancer in [place] on 09/09/2022  Follow-up with outpatient       VTE prophylaxis: heparin ppx    I have performed a physical exam and reviewed and updated ROS and Plan today (9/25/2022). In review of yesterday's note (9/24/2022), there are no changes except as documented above.

## 2022-09-25 NOTE — ASSESSMENT & PLAN NOTE
Mildly above basline 1.6-1.9 range  Continue to improve  PA cath suggest reduce diuresis, may improve with lower dose lasix ordered by Cards  Avoid nephrotoxins  Renal U/s ok, no hydronephrosis  Renal dose meds

## 2022-09-25 NOTE — ASSESSMENT & PLAN NOTE
Secondary to COVID  RT protocols  HFNC O2 wean as tolerated  Clinically better per patient - WOB ZERO  CXR better bilat opacites today  Dry on exam and by PA cath, Cards reducing lasix  Decadron/RDV for Covid  Doxycycline empiriaclly for elevated ProCal  IS/Mobilize  Self prone  If develops secondary infection may need BAL

## 2022-09-25 NOTE — ASSESSMENT & PLAN NOTE
Recurrent lymphoma - IMMUNOCOMPROMISED  Rx Dwight Melissa in [facility]  Last chemo/immunotherapy 9/9  [place] (Alkylating agent) and Obinutuzumab (anti - CD20)  Pancytopenia on admit - improving  Pulmonary process likely COVID and not secondary to chemo/MAB directly

## 2022-09-25 NOTE — PROGRESS NOTES
4 Eyes Skin Assessment Completed by MARCOS Lopez and Manish RN.    Head Scab, Scratch, Redness, and Scar  Ears Redness and Blanching  Nose Scab  Mouth WDL  Neck WDL  Breast/Chest WDL  Shoulder Blades WDL  Spine WDL  (R) Arm/Elbow/Hand Redness and Blanching  (L) Arm/Elbow/Hand Redness and Blanching  Abdomen WDL  Groin WDL  Scrotum/Coccyx/Buttocks Redness and Blanching  (R) Leg WDL  (L) Leg WDL  (R) Heel/Foot/Toe Redness and Blanching  (L) Heel/Foot/Toe Redness and Blanching          Devices In Places ECG, Blood Pressure Cuff, and Pulse Ox      Interventions In Place Pillows, Q2 Turns, and Pressure Redistribution Mattress    Possible Skin Injury No    Pictures Uploaded Into Epic N/A  Wound Consult Placed N/A  RN Wound Prevention Protocol Ordered No

## 2022-09-25 NOTE — ASSESSMENT & PLAN NOTE
Improved  H/o recurrent lymphoma  Recent T[place] - alkylating agent - infusion 9/9/22  Recent OBINUTUZUMAB - anti CD20 - infusion 9/9/22

## 2022-09-25 NOTE — PROGRESS NOTES
Patient's oxygenation saturation was 84-85% on high flow at 30L and 50% oxygen. The patient was not short of breath and was asymptomatic. This RN tried rasing the head of the bed, having the patient breath more through his mouth, and take deep breaths, but nothing increased the oxygenation saturation of the patient. RT was notified and came to bedside. The high flow was adjusted to 50L and 80% oxygenation and the patient's saturation came up to 94%. We then proned the patient increasing his oxygen saturation to 95%. I will continue to monitor the patient.

## 2022-09-26 ENCOUNTER — APPOINTMENT (OUTPATIENT)
Dept: RADIOLOGY | Facility: MEDICAL CENTER | Age: 72
DRG: 177 | End: 2022-09-26
Payer: MEDICARE

## 2022-09-26 ENCOUNTER — TELEPHONE (OUTPATIENT)
Dept: CARDIOLOGY | Facility: MEDICAL CENTER | Age: 72
End: 2022-09-26
Payer: MEDICARE

## 2022-09-26 PROBLEM — I50.20 HEART FAILURE WITH REDUCED EJECTION FRACTION (HCC): Status: ACTIVE | Noted: 2022-09-26

## 2022-09-26 LAB
ALBUMIN SERPL BCP-MCNC: 2.5 G/DL (ref 3.2–4.9)
ALBUMIN/GLOB SERPL: 1.3 G/DL
ALP SERPL-CCNC: 91 U/L (ref 30–99)
ALT SERPL-CCNC: 82 U/L (ref 2–50)
ANION GAP SERPL CALC-SCNC: 15 MMOL/L (ref 7–16)
AST SERPL-CCNC: 58 U/L (ref 12–45)
BASOPHILS # BLD AUTO: 0 % (ref 0–1.8)
BASOPHILS # BLD: 0 K/UL (ref 0–0.12)
BILIRUB SERPL-MCNC: 0.5 MG/DL (ref 0.1–1.5)
BUN SERPL-MCNC: 71 MG/DL (ref 8–22)
CALCIUM SERPL-MCNC: 8 MG/DL (ref 8.5–10.5)
CHLORIDE SERPL-SCNC: 104 MMOL/L (ref 96–112)
CO2 SERPL-SCNC: 21 MMOL/L (ref 20–33)
CREAT SERPL-MCNC: 2.03 MG/DL (ref 0.5–1.4)
EOSINOPHIL # BLD AUTO: 0 K/UL (ref 0–0.51)
EOSINOPHIL NFR BLD: 0 % (ref 0–6.9)
ERYTHROCYTE [DISTWIDTH] IN BLOOD BY AUTOMATED COUNT: 48.1 FL (ref 35.9–50)
GFR SERPLBLD CREATININE-BSD FMLA CKD-EPI: 34 ML/MIN/1.73 M 2
GLOBULIN SER CALC-MCNC: 2 G/DL (ref 1.9–3.5)
GLUCOSE SERPL-MCNC: 104 MG/DL (ref 65–99)
HCT VFR BLD AUTO: 24.4 % (ref 42–52)
HGB BLD-MCNC: 8.5 G/DL (ref 14–18)
IMM GRANULOCYTES # BLD AUTO: 0.02 K/UL (ref 0–0.11)
IMM GRANULOCYTES NFR BLD AUTO: 0.7 % (ref 0–0.9)
LYMPHOCYTES # BLD AUTO: 0.05 K/UL (ref 1–4.8)
LYMPHOCYTES NFR BLD: 1.6 % (ref 22–41)
MCH RBC QN AUTO: 31.5 PG (ref 27–33)
MCHC RBC AUTO-ENTMCNC: 34.8 G/DL (ref 33.7–35.3)
MCV RBC AUTO: 90.4 FL (ref 81.4–97.8)
MONOCYTES # BLD AUTO: 0.14 K/UL (ref 0–0.85)
MONOCYTES NFR BLD AUTO: 4.6 % (ref 0–13.4)
NEUTROPHILS # BLD AUTO: 2.85 K/UL (ref 1.82–7.42)
NEUTROPHILS NFR BLD: 93.1 % (ref 44–72)
NRBC # BLD AUTO: 0 K/UL
NRBC BLD-RTO: 0 /100 WBC
PLATELET # BLD AUTO: 70 K/UL (ref 164–446)
PMV BLD AUTO: 11.1 FL (ref 9–12.9)
POTASSIUM SERPL-SCNC: 3.5 MMOL/L (ref 3.6–5.5)
PROT SERPL-MCNC: 4.5 G/DL (ref 6–8.2)
RBC # BLD AUTO: 2.7 M/UL (ref 4.7–6.1)
SODIUM SERPL-SCNC: 140 MMOL/L (ref 135–145)
WBC # BLD AUTO: 3.1 K/UL (ref 4.8–10.8)

## 2022-09-26 PROCEDURE — 99291 CRITICAL CARE FIRST HOUR: CPT | Performed by: INTERNAL MEDICINE

## 2022-09-26 PROCEDURE — 700102 HCHG RX REV CODE 250 W/ 637 OVERRIDE(OP): Performed by: INTERNAL MEDICINE

## 2022-09-26 PROCEDURE — A9270 NON-COVERED ITEM OR SERVICE: HCPCS | Performed by: STUDENT IN AN ORGANIZED HEALTH CARE EDUCATION/TRAINING PROGRAM

## 2022-09-26 PROCEDURE — 94640 AIRWAY INHALATION TREATMENT: CPT

## 2022-09-26 PROCEDURE — 700102 HCHG RX REV CODE 250 W/ 637 OVERRIDE(OP): Performed by: STUDENT IN AN ORGANIZED HEALTH CARE EDUCATION/TRAINING PROGRAM

## 2022-09-26 PROCEDURE — 700111 HCHG RX REV CODE 636 W/ 250 OVERRIDE (IP): Mod: TB | Performed by: INTERNAL MEDICINE

## 2022-09-26 PROCEDURE — 85025 COMPLETE CBC W/AUTO DIFF WBC: CPT

## 2022-09-26 PROCEDURE — 99233 SBSQ HOSP IP/OBS HIGH 50: CPT | Mod: GC | Performed by: INTERNAL MEDICINE

## 2022-09-26 PROCEDURE — 80053 COMPREHEN METABOLIC PANEL: CPT

## 2022-09-26 PROCEDURE — 71045 X-RAY EXAM CHEST 1 VIEW: CPT

## 2022-09-26 PROCEDURE — 770022 HCHG ROOM/CARE - ICU (200)

## 2022-09-26 PROCEDURE — 700111 HCHG RX REV CODE 636 W/ 250 OVERRIDE (IP): Performed by: STUDENT IN AN ORGANIZED HEALTH CARE EDUCATION/TRAINING PROGRAM

## 2022-09-26 PROCEDURE — A9270 NON-COVERED ITEM OR SERVICE: HCPCS | Performed by: INTERNAL MEDICINE

## 2022-09-26 PROCEDURE — 700111 HCHG RX REV CODE 636 W/ 250 OVERRIDE (IP): Performed by: INTERNAL MEDICINE

## 2022-09-26 PROCEDURE — 700105 HCHG RX REV CODE 258: Performed by: INTERNAL MEDICINE

## 2022-09-26 RX ORDER — BISACODYL 10 MG
10 SUPPOSITORY, RECTAL RECTAL
Status: DISCONTINUED | OUTPATIENT
Start: 2022-09-26 | End: 2022-10-12 | Stop reason: HOSPADM

## 2022-09-26 RX ORDER — ACYCLOVIR 400 MG/1
400 TABLET ORAL DAILY
Status: DISCONTINUED | OUTPATIENT
Start: 2022-09-26 | End: 2022-10-12 | Stop reason: HOSPADM

## 2022-09-26 RX ORDER — POTASSIUM CHLORIDE 7.45 MG/ML
10 INJECTION INTRAVENOUS
Status: COMPLETED | OUTPATIENT
Start: 2022-09-26 | End: 2022-09-26

## 2022-09-26 RX ORDER — POLYETHYLENE GLYCOL 3350 17 G/17G
1 POWDER, FOR SOLUTION ORAL
Status: DISCONTINUED | OUTPATIENT
Start: 2022-09-26 | End: 2022-10-12 | Stop reason: HOSPADM

## 2022-09-26 RX ORDER — AMOXICILLIN 250 MG
2 CAPSULE ORAL 2 TIMES DAILY
Status: DISCONTINUED | OUTPATIENT
Start: 2022-09-26 | End: 2022-10-12 | Stop reason: HOSPADM

## 2022-09-26 RX ADMIN — REMDESIVIR 100 MG: 100 INJECTION, POWDER, LYOPHILIZED, FOR SOLUTION INTRAVENOUS at 16:57

## 2022-09-26 RX ADMIN — POTASSIUM CHLORIDE 10 MEQ: 7.46 INJECTION, SOLUTION INTRAVENOUS at 08:27

## 2022-09-26 RX ADMIN — POTASSIUM CHLORIDE 10 MEQ: 7.46 INJECTION, SOLUTION INTRAVENOUS at 09:00

## 2022-09-26 RX ADMIN — ASPIRIN 81 MG: 81 TABLET, COATED ORAL at 05:46

## 2022-09-26 RX ADMIN — DOXYCYCLINE 100 MG: 100 TABLET, FILM COATED ORAL at 05:46

## 2022-09-26 RX ADMIN — HEPARIN SODIUM 5000 UNITS: 5000 INJECTION INTRAVENOUS; SUBCUTANEOUS at 05:45

## 2022-09-26 RX ADMIN — HEPARIN SODIUM 5000 UNITS: 5000 INJECTION INTRAVENOUS; SUBCUTANEOUS at 22:29

## 2022-09-26 RX ADMIN — DEXAMETHASONE 6 MG: 4 TABLET ORAL at 05:46

## 2022-09-26 RX ADMIN — DOXYCYCLINE 100 MG: 100 TABLET, FILM COATED ORAL at 16:57

## 2022-09-26 RX ADMIN — HEPARIN SODIUM 5000 UNITS: 5000 INJECTION INTRAVENOUS; SUBCUTANEOUS at 14:19

## 2022-09-26 RX ADMIN — ACYCLOVIR 400 MG: 400 TABLET ORAL at 14:19

## 2022-09-26 RX ADMIN — FUROSEMIDE 40 MG: 10 INJECTION, SOLUTION INTRAMUSCULAR; INTRAVENOUS at 05:45

## 2022-09-26 ASSESSMENT — ENCOUNTER SYMPTOMS
DIARRHEA: 1
ABDOMINAL PAIN: 0
EYE PAIN: 0
SHORTNESS OF BREATH: 0
EYE DISCHARGE: 0
HEMOPTYSIS: 0
NECK PAIN: 0
EYE REDNESS: 0
SEIZURES: 0
BRUISES/BLEEDS EASILY: 0
FEVER: 0
SINUS PAIN: 0
SHORTNESS OF BREATH: 1
HEADACHES: 0
PND: 0
FOCAL WEAKNESS: 0
SPUTUM PRODUCTION: 0
PHOTOPHOBIA: 0
PALPITATIONS: 0
CHILLS: 0
MYALGIAS: 0
NERVOUS/ANXIOUS: 0
BACK PAIN: 0
HALLUCINATIONS: 0
STRIDOR: 0
SORE THROAT: 0
NAUSEA: 0
VOMITING: 0
COUGH: 1

## 2022-09-26 ASSESSMENT — PAIN DESCRIPTION - PAIN TYPE
TYPE: ACUTE PAIN

## 2022-09-26 ASSESSMENT — FIBROSIS 4 INDEX: FIB4 SCORE: 7.05

## 2022-09-26 NOTE — CARE PLAN
Problem: Knowledge Deficit - Standard  Goal: Patient and family/care givers will demonstrate understanding of plan of care, disease process/condition, diagnostic tests and medications  Outcome: Progressing     Problem: Hemodynamics  Goal: Patient's hemodynamics, fluid balance and neurologic status will be stable or improve  Outcome: Progressing     Problem: Self Care  Goal: Patient will have the ability to perform ADLs independently or with assistance (bathe, groom, dress, toilet and feed)  Outcome: Progressing     The patient is Watcher - Medium risk of patient condition declining or worsening

## 2022-09-26 NOTE — PROGRESS NOTES
PROGRESS NOTE    Reason for Consult:  Asked by Dr Ceferino Rodriguez, to see this patient acute respiratory failure, HFrEF with EF of 20% and NSTEMI.     CC:  Loss of appetite, fatigue and weakness    HPI:  Mr. Ceferino Fitch is a 72 year old male who was transferred from an outside facility on 9/21 for elevated troponin and new T wave abnormalities. Patient had a recent hospitalization from 9/13-9/15 for [facility] and COVID-19 infection. He has a past medical history including, hypertension, hyperlipidemia, aortic stenosis and follicular lymphoma on immunotherapy.  Patient developed respiratory failure in the setting COVID-19 and heart failure. Patient was started on IV lasix, but continued to have worsening respiratory functioning. ECHO showed 20% EF and concern for worsening aortic stenosis. CTS was consulted. Dexamethasone was continued and remdesivir was started for his COVID-19 infection.   Patient began to have worsening hypoxic respiratory failure and was started on high flow nasal cannula. A Helmetta catheter was placed on 9/25 to better assess heart failure vs. COVID-19 cause of pulmonary edema and hypoxic respiratory failure.   Right heart catheter showed CO of 4 L/min, CI: 2.1 L/min/m2 , CVP 1 mm Hg, pulmonary arterial mean pressure of 15 mmHg and pulmonary capillary pressure of 6 mmHg.   Patient has been feeling alright overall. Has had increased coughing. Denies any chest pain, or pressure. No palpitations or lightheadness or dizziness. No swelling noted in legs.           Medications / Drug list prior to admission:  No current facility-administered medications on file prior to encounter.     Current Outpatient Medications on File Prior to Encounter   Medication Sig Dispense Refill    acetaminophen (TYLENOL) 325 MG Tab Take 325 mg by mouth one time as needed.      Cyanocobalamin 2000 MCG Tab Take 1 Tablet by mouth every day.      acyclovir (ZOVIRAX) 400 MG tablet Take 400 mg by mouth every day.       allopurinol (ZYLOPRIM) 100 MG Tab Take 100 mg by mouth every day.      aspirin 81 MG EC tablet Take 81 mg by mouth every day.      vitamin D (VITAMIND D3) 1000 UNIT Tab Take 2,000 Units by mouth every day.      rosuvastatin (CRESTOR) 20 MG Tab Take 20 mg by mouth every day.         Current list of administered Medications:    Current Facility-Administered Medications:     furosemide (LASIX) injection 40 mg, 40 mg, Intravenous, Q DAY, Vic Escobar M.D., 40 mg at 09/26/22 0545    remdesivir (Veklury) 100 mg in  mL IVPB, 100 mg, Intravenous, DAILY AT 1800, Krystal Butler M.D., Stopped at 09/25/22 2033    aspirin EC (ECOTRIN) tablet 81 mg, 81 mg, Oral, DAILY, Jennifer Villarreal M.D., 81 mg at 09/26/22 0546    heparin injection 5,000 Units, 5,000 Units, Subcutaneous, Q8HRS, Jennifer Villarreal M.D., 5,000 Units at 09/26/22 0545    dexamethasone (DECADRON) tablet 6 mg, 6 mg, Oral, DAILY, Judah Chen M.D., 6 mg at 09/26/22 0546    doxycycline monohydrate (ADOXA) tablet 100 mg, 100 mg, Oral, Q12HRS, Judah Chen M.D., 100 mg at 09/26/22 0546    Past Medical History:   Diagnosis Date    Cancer (HCC)     Follicular lymphoma (HCC)     Hypertension        No past surgical history on file.    No family history on file.  Patient family history was personally reviewed, no pertinent family history to current presentation    Social History     Socioeconomic History    Marital status:      Spouse name: Not on file    Number of children: Not on file    Years of education: Not on file    Highest education level: Not on file   Occupational History    Not on file   Tobacco Use    Smoking status: Former     Passive exposure: Past    Smokeless tobacco: Never   Vaping Use    Vaping Use: Not on file   Substance and Sexual Activity    Alcohol use: Not Currently     Alcohol/week: 3.0 oz     Types: 5 Cans of beer per week    Drug use: Never    Sexual activity: Not on file   Other Topics Concern    Not on file   Social History Narrative     Not on file     Social Determinants of Health     Financial Resource Strain: Not on file   Food Insecurity: Not on file   Transportation Needs: Not on file   Physical Activity: Not on file   Stress: Not on file   Social Connections: Not on file   Intimate Partner Violence: Not on file   Housing Stability: Not on file       ALLERGIES:  Allergies   Allergen Reactions    Penicillins Rash and Unspecified     Hoarseness         Review of systems:  A complete review of symptoms was reviewed with patient.  This is reviewed in H&P and PMH. ALL OTHERS reviewed and negative    Physical exam:  Patient Vitals for the past 24 hrs:   BP Temp Temp src Pulse Resp SpO2 Weight   09/26/22 0646 -- -- -- 70 (!) 24 95 % --   09/26/22 0600 (!) 96/53 -- -- 72 20 91 % --   09/26/22 0500 127/63 -- -- (!) 58 18 94 % --   09/26/22 0400 110/58 36.2 °C (97.2 °F) Temporal 70 18 97 % --   09/26/22 0300 110/54 -- -- 64 20 93 % 66.3 kg (146 lb 2.6 oz)   09/26/22 0214 -- -- -- 72 (!) 21 97 % --   09/26/22 0200 104/56 -- -- 62 20 -- --   09/26/22 0100 106/57 -- -- 67 20 93 % --   09/26/22 0000 118/59 36.2 °C (97.1 °F) Temporal 61 17 96 % --   09/25/22 2300 128/62 -- -- 72 20 -- --   09/25/22 2200 125/58 -- -- 61 20 95 % --   09/25/22 2118 -- -- -- 69 (!) 22 93 % --   09/25/22 2100 126/60 -- -- 60 18 -- --   09/25/22 2000 117/58 36 °C (96.8 °F) Temporal 69 19 95 % --   09/25/22 1900 119/57 -- -- 68 19 92 % --   09/25/22 1811 -- -- -- 68 (!) 27 96 % --   09/25/22 1800 115/56 -- -- 68 17 96 % --   09/25/22 1700 -- 36 °C (96.8 °F) Temporal 72 20 97 % --   09/25/22 1600 (!) 83/60 -- -- 77 (!) 26 92 % --   09/25/22 1500 115/54 -- -- 68 18 98 % --   09/25/22 1441 -- -- -- 73 (!) 22 99 % --   09/25/22 1400 110/53 -- -- 69 19 99 % --   09/25/22 1300 111/53 -- -- 66 (!) 21 99 % --   09/25/22 1200 (!) 157/70 -- -- 85 (!) 23 96 % --   09/25/22 1134 -- -- -- 80 (!) 24 100 % --   09/25/22 1100 126/58 -- -- 67 17 100 % --   09/25/22 1047 -- (!) 35.7 °C (96.3 °F)  Temporal -- -- -- 66 kg (145 lb 8.1 oz)   09/25/22 0830 -- -- -- -- -- 90 % --   09/25/22 0801 116/57 36.6 °C (97.8 °F) Temporal 84 18 95 % --     General: No acute distress, sitting in hospital bed with high flow nasal cannula (40 L, 60-70% O2)  EYES: no jaundice  HEENT: OP clear   Neck: No JVD.   CVS:  RRR. S1 + S2. Unable to note aortic murmur  Resp: CTAB. Crackles/ rhonchi bilaterally, no edema present  Abdomen: Soft, NT, ND,  Skin: Grossly nothing acute no obvious rashes  Neurological: Alert, Moves all extremities, no cranial nerve defects on limited exam  Extremities: Pulse 2+ in b/l LE. No cyanosis.     Data:  Laboratory studies personally reviewed by me:  Recent Results (from the past 24 hour(s))   CBC WITHOUT DIFFERENTIAL    Collection Time: 09/25/22  9:08 AM   Result Value Ref Range    WBC 5.3 4.8 - 10.8 K/uL    RBC 2.90 (L) 4.70 - 6.10 M/uL    Hemoglobin 9.2 (L) 14.0 - 18.0 g/dL    Hematocrit 26.2 (L) 42.0 - 52.0 %    MCV 90.3 81.4 - 97.8 fL    MCH 31.7 27.0 - 33.0 pg    MCHC 35.1 33.7 - 35.3 g/dL    RDW 47.3 35.9 - 50.0 fL    Platelet Count 99 (L) 164 - 446 K/uL    MPV 11.0 9.0 - 12.9 fL   Comp Metabolic Panel    Collection Time: 09/25/22  9:08 AM   Result Value Ref Range    Sodium 138 135 - 145 mmol/L    Potassium 3.5 (L) 3.6 - 5.5 mmol/L    Chloride 102 96 - 112 mmol/L    Co2 19 (L) 20 - 33 mmol/L    Anion Gap 17.0 (H) 7.0 - 16.0    Glucose 107 (H) 65 - 99 mg/dL    Bun 63 (H) 8 - 22 mg/dL    Creatinine 2.09 (H) 0.50 - 1.40 mg/dL    Calcium 8.2 (L) 8.5 - 10.5 mg/dL    AST(SGOT) 98 (H) 12 - 45 U/L    ALT(SGPT) 105 (H) 2 - 50 U/L    Alkaline Phosphatase 104 (H) 30 - 99 U/L    Total Bilirubin 0.6 0.1 - 1.5 mg/dL    Albumin 2.8 (L) 3.2 - 4.9 g/dL    Total Protein 5.0 (L) 6.0 - 8.2 g/dL    Globulin 2.2 1.9 - 3.5 g/dL    A-G Ratio 1.3 g/dL   ESTIMATED GFR    Collection Time: 09/25/22  9:08 AM   Result Value Ref Range    GFR (CKD-EPI) 33 (A) >60 mL/min/1.73 m 2   Comp Metabolic Panel    Collection Time:  09/26/22  3:21 AM   Result Value Ref Range    Sodium 140 135 - 145 mmol/L    Potassium 3.5 (L) 3.6 - 5.5 mmol/L    Chloride 104 96 - 112 mmol/L    Co2 21 20 - 33 mmol/L    Anion Gap 15.0 7.0 - 16.0    Glucose 104 (H) 65 - 99 mg/dL    Bun 71 (H) 8 - 22 mg/dL    Creatinine 2.03 (H) 0.50 - 1.40 mg/dL    Calcium 8.0 (L) 8.5 - 10.5 mg/dL    AST(SGOT) 58 (H) 12 - 45 U/L    ALT(SGPT) 82 (H) 2 - 50 U/L    Alkaline Phosphatase 91 30 - 99 U/L    Total Bilirubin 0.5 0.1 - 1.5 mg/dL    Albumin 2.5 (L) 3.2 - 4.9 g/dL    Total Protein 4.5 (L) 6.0 - 8.2 g/dL    Globulin 2.0 1.9 - 3.5 g/dL    A-G Ratio 1.3 g/dL   ESTIMATED GFR    Collection Time: 09/26/22  3:21 AM   Result Value Ref Range    GFR (CKD-EPI) 34 (A) >60 mL/min/1.73 m 2   CBC WITH DIFFERENTIAL    Collection Time: 09/26/22  4:16 AM   Result Value Ref Range    WBC 3.1 (L) 4.8 - 10.8 K/uL    RBC 2.70 (L) 4.70 - 6.10 M/uL    Hemoglobin 8.5 (L) 14.0 - 18.0 g/dL    Hematocrit 24.4 (L) 42.0 - 52.0 %    MCV 90.4 81.4 - 97.8 fL    MCH 31.5 27.0 - 33.0 pg    MCHC 34.8 33.7 - 35.3 g/dL    RDW 48.1 35.9 - 50.0 fL    Platelet Count 70 (L) 164 - 446 K/uL    MPV 11.1 9.0 - 12.9 fL    Neutrophils-Polys 93.10 (H) 44.00 - 72.00 %    Lymphocytes 1.60 (L) 22.00 - 41.00 %    Monocytes 4.60 0.00 - 13.40 %    Eosinophils 0.00 0.00 - 6.90 %    Basophils 0.00 0.00 - 1.80 %    Immature Granulocytes 0.70 0.00 - 0.90 %    Nucleated RBC 0.00 /100 WBC    Neutrophils (Absolute) 2.85 1.82 - 7.42 K/uL    Lymphs (Absolute) 0.05 (L) 1.00 - 4.80 K/uL    Monos (Absolute) 0.14 0.00 - 0.85 K/uL    Eos (Absolute) 0.00 0.00 - 0.51 K/uL    Baso (Absolute) 0.00 0.00 - 0.12 K/uL    Immature Granulocytes (abs) 0.02 0.00 - 0.11 K/uL    NRBC (Absolute) 0.00 K/uL         All pertinent features of laboratory and imaging reviewed including primary images where applicable      Principal Problem:    Acute respiratory failure with hypoxia (HCC) POA: Unknown  Active Problems:    Follicular lymphoma grade I (HCC) POA: Yes     Essential hypertension, benign POA: Yes      Overview: Last Assessment & Plan:       Formatting of this note might be different from the original.      Known white-coat syndrome, BP at home 120's/80's    NSTEMI (non-ST elevated myocardial infarction) (HCC) POA: Yes    COVID POA: Unknown    Low bicarbonate POA: Unknown    Hypokalemia POA: Unknown    Pancytopenia (HCC) POA: Unknown    CKD (chronic kidney disease) POA: Unknown    Elevated brain natriuretic peptide (BNP) level POA: Unknown    Elevated liver enzymes POA: Unknown    Advance care planning POA: Unknown  Resolved Problems:    * No resolved hospital problems. *      Assessment / Plan:   Mr. Ceferino Fitch is a 72 year old male who presented with shortness of breath and fatigue. He is COVID-19 positive and has had worsening hypoxic respiratory failure. There has been concern that his pulmonary edema is related to his heart failure, but patient has been euvolemic and continues to have increased oxygen need. Thayer catheter indicated pulmonary involvement vs. Heart failure. CVP 1 mm Hg, Pulmonary artery mean pressure 15 mmHg and pulmonary wedge pressure of 6 mmHg points to pulmonary cause, likely COVID-19.   Concern for secondary infections, given immunocompromised state.   Aortic stenosis is low volume and low flow, so unable to determine severity.     Plan:  -Remove Thayer catheter  -Dobutamine stress echo in future, to determine   -Continue lasix IV   -TAVR replacement after improvement of infection        I personally discussed his case with Dr. Marcos Ball    It is my pleasure to participate in the care of Mr. Fitch.  Please do not hesitate to contact me with questions or concerns.    Fabiana Isabel MD, PGY2  Cardiologist [facility]

## 2022-09-26 NOTE — CARE PLAN
The patient is Watcher - Medium risk of patient condition declining or worsening    Shift Goals  Clinical Goals: Hemodynamic stability, monitor o2  Patient Goals: comfort/rest  Family Goals: n/a    Progress made toward(s) clinical / shift goals:        Problem: Knowledge Deficit - Standard  Goal: Patient and family/care givers will demonstrate understanding of plan of care, disease process/condition, diagnostic tests and medications  Outcome: Progressing     Problem: Hemodynamics  Goal: Patient's hemodynamics, fluid balance and neurologic status will be stable or improve  Outcome: Progressing     Problem: Respiratory  Goal: Patient will achieve/maintain optimum respiratory ventilation and gas exchange  Outcome: Progressing     Problem: Pain - Standard  Goal: Alleviation of pain or a reduction in pain to the patient’s comfort goal  Outcome: Progressing       Patient is not progressing towards the following goals:

## 2022-09-26 NOTE — PROGRESS NOTES
[facility] ICU Progress Note      Admit Date: 9/21/2022    Resident(s): Sally Mondragon M.D.   Attending:  Dr. Rodriguez    Patient ID:    Name:  Ceferino Fitch     YOB: 1950  Age:  72 y.o.  male   MRN:  [id number]    Hospital Course (carried forward and updated):  Ceferino Fitch is a 72 y.o. male with history significant for recent BRADLEY and COVID-19 infection, hypertension, hyperlipidemia, aortic stenosis and follicular lymphoma on immunotherapy. He presented on 09/21/2022 with NSTEMI and worsening SOB and echo showing EF 0f 20% with infelateral hypokinesis. Cardiology was consulted with placement of PA catheter, and he started being evaluated for a TAVR. He was started on decadron, doxycycline, lasix and remdesevir with some symptomatic improvement.    Consultants:  Critical Care  Cardiology  Surgery    Interval Events:    -No acute event overnight  -Was seeing by cardiology in the morning with recommendation to discontinue [place] catheter  -Cardiac index remains above 2  -Assessed by surgery to determine the need for TAVR  -Right heart catheter showed CO of 4 L/min, CI: 2.1 L/min/m2 , CVP 1 mm Hg, pulmonary arterial mean pressure of 15 mmHg and pulmonary capillary pressure of 6 mmHg      Vitals Range last 24h:  Temp:  [36 °C (96.8 °F)-36.4 °C (97.5 °F)] 36.4 °C (97.5 °F)  Pulse:  [58-85] 69  Resp:  [17-29] 26  BP: ()/(52-70) 110/55  SpO2:  [88 %-99 %] 98 %      Intake/Output Summary (Last 24 hours) at 9/26/2022 1152  Last data filed at 9/26/2022 1000  Gross per 24 hour   Intake 903.33 ml   Output 2150 ml   Net -1246.67 ml        Review of Systems   Constitutional:  Positive for malaise/fatigue (better). Negative for chills and fever.   HENT:  Negative for congestion, sinus pain and sore throat.         Lost sense of taste   Eyes:  Negative for photophobia.   Respiratory:  Positive for cough. Negative for hemoptysis, sputum production and shortness of breath.    Cardiovascular:  Negative  for chest pain, palpitations, leg swelling and PND.   Gastrointestinal:  Positive for diarrhea (< once/day). Negative for abdominal pain, nausea and vomiting.   Genitourinary:  Negative for dysuria, frequency and hematuria.   Musculoskeletal:  Negative for back pain and myalgias.   Neurological:  Negative for focal weakness and headaches.   Endo/Heme/Allergies:  Does not bruise/bleed easily.   Psychiatric/Behavioral:  The patient is not nervous/anxious.       PHYSICAL EXAM:  Vitals:    09/26/22 0900 09/26/22 0941 09/26/22 1000 09/26/22 1100   BP: (!) 87/52  110/55    Pulse: 77 71 69 69   Resp: (!) 29 (!) 26 (!) 22 (!) 26   Temp:       TempSrc:       SpO2: 88% 99% 99% 98%   Weight:        Body mass index is 20.39 kg/m².    O2 therapy: Pulse Oximetry: 98 %, O2 (LPM): 40, O2 Delivery Device: Heated High Flow Nasal Cannula    Date 09/26/22 0700 - 09/27/22 0659   Shift 5804-6232 2809-0257 1345-4294 24 Hour Total   INTAKE   P.O. 200   200     P.O. 200   200   IV Piggyback 153.3   153.3     Volume (mL) (remdesivir (Veklury) 100 mg in  mL IVPB) 0   0     Volume (mL) (potassium chloride (KCL) ivpb 10 mEq) 153.3   153.3   Shift Total 353.3   353.3   OUTPUT   Urine 350   350     Urine Void (mL) 350   350   Shift Total 350   350   NET 3.3   3.3        Physical Exam  Constitutional:       Appearance: He is not diaphoretic.   HENT:      Head: Normocephalic.      Mouth/Throat:      Pharynx: Oropharynx is clear.   Eyes:      Pupils: Pupils are equal, round, and reactive to light.   Cardiovascular:      Heart sounds: Murmur heard.      Comments: Sinus rhythm  Pulmonary:      Breath sounds: No wheezing or rales.   Abdominal:      General: There is no distension.      Tenderness: There is no abdominal tenderness.   Musculoskeletal:      Cervical back: Normal range of motion.      Right lower leg: No edema.      Left lower leg: No edema.   Skin:     General: Skin is warm.      Capillary Refill: Capillary refill takes less than 2  seconds.   Neurological:      General: No focal deficit present.      Mental Status: He is oriented to person, place, and time.      Cranial Nerves: No cranial nerve deficit.   Psychiatric:         Mood and Affect: Mood normal.       Recent Labs     09/24/22  1336   [id number] 7.44   [id number] 23.8*   [id number] 69.9   GKEX8YRY 91.9*   ARTHCO3 16*   FIO2 96*   ARTBE -7*     Recent Labs     09/24/22 0514 09/25/22  0908 09/26/22  0321   SODIUM 137 138 140   POTASSIUM 4.2 3.5* 3.5*   CHLORIDE 108 102 104   CO2 17* 19* 21   BUN 56* 63* 71*   CREATININE 2.19* 2.09* 2.03*   CALCIUM 7.5* 8.2* 8.0*     Recent Labs     09/24/22 0514 09/25/22  0908 09/26/22  0321   ALTSGPT  --  105* 82*   ASTSGOT  --  98* 58*   ALKPHOSPHAT  --  104* 91   TBILIRUBIN  --  0.6 0.5   GLUCOSE 119* 107* 104*     Recent Labs     09/24/22 0514 09/25/22  0908 09/26/22  0416   RBC 2.51* 2.90* 2.70*   HEMOGLOBIN 7.9* 9.2* 8.5*   HEMATOCRIT 22.5* 26.2* 24.4*   PLATELETCT 72* 99* 70*     Recent Labs     09/24/22 0514 09/25/22  0908 09/26/22 0321 09/26/22  0416   WBC 3.2* 5.3  --  3.1*   NEUTSPOLYS  --   --   --  93.10*   LYMPHOCYTES  --   --   --  1.60*   MONOCYTES  --   --   --  4.60   EOSINOPHILS  --   --   --  0.00   BASOPHILS  --   --   --  0.00   ASTSGOT  --  98* 58*  --    ALTSGPT  --  105* 82*  --    ALKPHOSPHAT  --  104* 91  --    TBILIRUBIN  --  0.6 0.5  --        Meds:   furosemide  40 mg      remdesivir  100 mg Stopped (09/25/22 2033)    aspirin EC  81 mg      heparin  5,000 Units      dexamethasone  6 mg      doxycycline monohydrate  100 mg          Procedures:  [place] catheter    Imaging:  DX-CHEST-PORTABLE (1 VIEW)   Final Result      1.  Right-sided [place] catheter tip projects in the distal right main pulmonary artery region. No pneumothorax.   2.  Mild interval worsening in hazy bilateral pulmonary opacities.         DX-CHEST-PORTABLE (1 VIEW)   Final Result      1.  Right-sided [place]Frannie catheter tip projects in the distal right main  pulmonary artery region. No pneumothorax.   2.  Probable slight improvement in hazy bilateral pulmonary opacities.      DX-CHEST-PORTABLE (1 VIEW)   Final Result      1.  Right-sided PICC line unchanged in position.      2.  Increased interstitial markings throughout the lung fields especially in the perihilar regions consistent with pulmonary edema and/or pneumonitis.      DX-CHEST-PORTABLE (1 VIEW)   Final Result      Unchanged BILATERAL pneumonia      EC-ECHOCARDIOGRAM COMPLETE W/O CONT   Final Result      DX-CHEST-LIMITED (1 VIEW)   Final Result      1.  Multifocal pneumonia. Pulmonary edema could have a similar appearance.      US-RUQ   Final Result      Unremarkable RIGHT upper quadrant ultrasound      CT-TAVR CHEST-ABD-PELVIS W/WO POST PROCESS    (Results Pending)       ASSESSEMENT and PLAN:    * Acute respiratory failure with hypoxia (HCC)  Assessment & Plan  Secondary to COVID  RT protocols  HFNC O2 wean as tolerated  Clinically better per patient - WOB ZERO  CXR better bilat opacites today  Dry on exam and by PA cath, Cards reducing lasix  Decadron/RDV for Covid  Doxycycline empiriaclly for elevated ProCal  IS/Mobilize  Self prone  If develops secondary infection may need BAL    Heart failure with reduced ejection fraction (HCC)  Assessment & Plan  Previous echo in 2019 showing EF of 70%  Latest echo with EF of 20% with inferolateral hypokinesis likely secondary to NSTEMI  Continue aspirin and furosemide  The question is to determine whether or not the symptoms of heart failure are caused by the aortic stenosis or another intrinsic insult such as hypokinesis  Cardio has been involved and has determine that the answer to the above point is a dobutamine echo    NSTEMI (non-ST elevated myocardial infarction) (HCC)- (present on admission)  Assessment & Plan  Ongoing eval by cardiology  Focal wall motion abn on ECHO - EF 20%  Hemodynamics ok today by PA cath  Related to COVID?  Not a great operative  candidate  Statin  SQ heparin  ASA  BB/ARB as per Cards    AS?  TAVR per [facility] if eval shows need, no surgery per patient    CKD (chronic kidney disease)  Assessment & Plan  Mildly above basline 1.6-1.9 range  Continue to improve  PA cath suggest reduce diuresis, may improve with lower dose lasix ordered by Cards  Avoid nephrotoxins  Renal U/s ok, no hydronephrosis  Renal dose meds    COVID  Assessment & Plan  Recurrent problem  1st diagnosis + PCR 7/23/22, clinically improved -> chemo 9/9/22  Recurrent S/s + AG & PCR 9/13/22    Triple isolation  Decadron  RDV per ID  Empiric Doxycycline  Recheck D-dimer, CRP, BNP and Procal in AM    Elevated liver enzymes  Assessment & Plan  Elevated transaminases - improved  Likely related to acute heart failure  Avoid hepato-toxins  Serial CMP  ABD U/s ok    Pancytopenia (HCC)  Assessment & Plan  Improved  H/o recurrent lymphoma  Recent [place] - alkylating agent - infusion 9/9/22  Recent OBINUTUZUMAB - anti CD20 - infusion 9/9/22    Hypokalemia  Assessment & Plan  replete    Advance care planning  Assessment & Plan  Full code at this time    Follicular lymphoma grade I (HCC)- (present on admission)  Assessment & Plan  Recurrent lymphoma - IMMUNOCOMPROMISED  Rx [facility] in [place]  Last chemo/immunotherapy 9/9  [place] (Alkylating agent) and Obinutuzumab (anti - CD20)  Pancytopenia on admit - improving  Pulmonary process likely COVID and not secondary to chemo/MAB directly      DISPO: Keep in ICU    CODE STATUS: Full    Quality Measures:  Feeding: Cardiac diet  Analgesia: None  Sedation: None  Thromboprophylaxis: Heparin  Head of bed: >30 degrees  Ulcer prophylaxis: Not needed  Glycemic control: None needed  Bowel care: bowel regimenPRN in place        Sally Mondragon M.D.

## 2022-09-26 NOTE — CARE PLAN
Problem: Humidified High Flow Nasal Cannula  Goal: Maintain adequate oxygenation dependent on patient condition  Description: Target End Date:  resolve prior to discharge or when underlying condition is resolved/stabilized    1.  Implement humidified high flow oxygen therapy  2.  Titrate high flow oxygen to maintain appropriate SpO2  Outcome: Progressing  Flowsheets  Taken 9/26/2022 0214 by Paris Valentin, RRT  O2 ([facility]): 40  FiO2%: 60 %  Indication: All other patients: SpO2 less than 90% despite conventional supplemental oxygen devices  Outcome: Normoxia

## 2022-09-26 NOTE — CARE PLAN
Problem: Knowledge Deficit - Standard  Goal: Patient and family/care givers will demonstrate understanding of plan of care, disease process/condition, diagnostic tests and medications  Outcome: Progressing  Note: Pt updated on POC, tests, and medications. Pt verbalizes understanding and has no further questions at this time. Pt educated on calling for any more questions.        Problem: Fall Risk  Goal: Patient will remain free from falls  Outcome: Progressing  Note: Pt remains free from falls at this time. Safety precautions in place. Pt educated on calling for assistance when needed.      The patient is Watcher - Medium risk of patient condition declining or worsening    Shift Goals  Clinical Goals: Monitor O2, VSS, heart cath  Patient Goals: safety & comfort  Family Goals: n/a    Progress made toward(s) clinical / shift goals: No signs or symptoms of resp distress noted or reported onF    Patient is not progressing towards the following goals:

## 2022-09-26 NOTE — CARE PLAN
Problem: Humidified High Flow Nasal Cannula  Goal: Maintain adequate oxygenation dependent on patient condition  Description: Target End Date:  resolve prior to discharge or when underlying condition is resolved/stabilized    1.  Implement humidified high flow oxygen therapy  2.  Titrate high flow oxygen to maintain appropriate SpO2  Flowsheets  Taken 9/26/2022 1420  O2 (LPM): 40  FiO2%: 60 %  Taken 9/25/2022 1725  Indication: All other patients: SpO2 less than 90% despite conventional supplemental oxygen devices  Outcome: Normoxia         Respiratory Update    Treatment modality / Frequency    HFNC / Continuous     Pt tolerating current treatments well with no adverse reactions.

## 2022-09-26 NOTE — TELEPHONE ENCOUNTER
Referral from: Dr. Chen in for LFLG sev AS    Per Dr. Escobar, schedule consult only at this time. Patient scheduled, will confirm once discharged.

## 2022-09-26 NOTE — PROGRESS NOTES
CRITICAL CARE MEDICINE ATTENDING PROGRESS NOTE    Date of admission  9/21/2022    Chief Complaint  72 y.o. male admitted 9/21/2022 with respiratory failure, SARS-CoV-2, NSTEMI.  He has a history of CKD and NHL on immunotherapy.    Hospital Course      9/26 -    high flow nasal cannula.  Continue dexamethasone and remdesivir.  Force diuresis.      Interval Problem Update  Reviewed last 24 hour events:      SR  PA catheter in place  97.5  -1850 mL in the last 24  -6328 mL since admit  Replete potassium  HFNC      Review of Systems  Review of Systems   Constitutional:  Positive for malaise/fatigue. Negative for chills and fever.   HENT:  Negative for ear discharge, nosebleeds and sinus pain.    Eyes:  Negative for pain, discharge and redness.   Respiratory:  Positive for cough and shortness of breath. Negative for stridor.    Cardiovascular:  Negative for chest pain, palpitations and leg swelling.   Gastrointestinal:  Negative for abdominal pain, nausea and vomiting.   Genitourinary:  Negative for dysuria, hematuria and urgency.   Musculoskeletal:  Negative for myalgias and neck pain.   Skin:  Negative for itching.   Neurological:  Negative for seizures and headaches.   Endo/Heme/Allergies:  Does not bruise/bleed easily.   Psychiatric/Behavioral:  Negative for hallucinations. The patient is not nervous/anxious.      Vital Signs for the last 24 hours  Temp:  [35.7 °C (96.3 °F)-36.4 °C (97.5 °F)] 36.4 °C (97.5 °F)  Pulse:  [58-85] 76  Resp:  [17-27] 24  BP: ()/(53-70) 100/53  SpO2:  [90 %-100 %] 93 %    Hemodynamic parameters for the last 24 hours  CVP:  [0 MM HG] 0 MM HG  PCWP:  [-2 MM HG-6 MM HG] -2 MM HG  CO:  [3.87-3.9] 3.87  CI:  [2.1-2.12] 2.1    Vent Settings for the last 24 hours       Physical Exam  Physical Exam  Constitutional:       Appearance: He is not diaphoretic.   HENT:      Head: Normocephalic.      Mouth/Throat:      Pharynx: Oropharynx is clear.   Eyes:      Pupils: Pupils are equal, round, and  reactive to light.   Cardiovascular:      Heart sounds: Murmur heard.      Comments: Sinus rhythm  Pulmonary:      Breath sounds: Rales (Few crackles at the bases) present. No wheezing.   Abdominal:      General: There is no distension.      Tenderness: There is no abdominal tenderness.   Musculoskeletal:      Cervical back: Normal range of motion.      Right lower leg: No edema.      Left lower leg: No edema.   Skin:     General: Skin is warm.      Capillary Refill: Capillary refill takes less than 2 seconds.   Neurological:      General: No focal deficit present.      Mental Status: He is oriented to person, place, and time.      Cranial Nerves: No cranial nerve deficit.       Medications  Current Facility-Administered Medications   Medication Dose Route Frequency Provider Last Rate Last Admin    potassium chloride (KCL) ivpb 10 mEq  10 mEq Intravenous Q HOUR Sally Mondragon M.D.        furosemide (LASIX) injection 40 mg  40 mg Intravenous Q DAY Vic Escobar M.D.   40 mg at 09/26/22 0545    remdesivir (Veklury) 100 mg in  mL IVPB  100 mg Intravenous DAILY AT 1800 Krystal Butler M.D.   Stopped at 09/25/22 2033    aspirin EC (ECOTRIN) tablet 81 mg  81 mg Oral DAILY Jennifer Villarreal M.D.   81 mg at 09/26/22 0546    heparin injection 5,000 Units  5,000 Units Subcutaneous Q8HRS Jennifer Villarreal M.D.   5,000 Units at 09/26/22 0545    dexamethasone (DECADRON) tablet 6 mg  6 mg Oral DAILY Judah Chen M.D.   6 mg at 09/26/22 0546    doxycycline monohydrate (ADOXA) tablet 100 mg  100 mg Oral Q12HRS Judah Chen M.D.   100 mg at 09/26/22 0546       Fluids    Intake/Output Summary (Last 24 hours) at 9/26/2022 0825  Last data filed at 9/26/2022 0800  Gross per 24 hour   Intake 650 ml   Output 2400 ml   Net -1750 ml       Laboratory  Recent Labs     09/24/22  1336   [id number] 7.44   [id number] 23.8*   [id number] 69.9   QLUT6PLU 91.9*   ARTHCO3 16*   FIO2 96*   ARTBE -7*     Recent Labs     09/24/22  0514  09/25/22  0908 09/26/22  0321   SODIUM 137 138 140   POTASSIUM 4.2 3.5* 3.5*   CHLORIDE 108 102 104   CO2 17* 19* 21   BUN 56* 63* 71*   CREATININE 2.19* 2.09* 2.03*   CALCIUM 7.5* 8.2* 8.0*     Recent Labs     09/24/22  0514 09/25/22  0908 09/26/22  0321   ALTSGPT  --  105* 82*   ASTSGOT  --  98* 58*   ALKPHOSPHAT  --  104* 91   TBILIRUBIN  --  0.6 0.5   GLUCOSE 119* 107* 104*     Recent Labs     09/24/22 0514 09/25/22 0908 09/26/22 0321 09/26/22  0416   WBC 3.2* 5.3  --  3.1*   NEUTSPOLYS  --   --   --  93.10*   LYMPHOCYTES  --   --   --  1.60*   MONOCYTES  --   --   --  4.60   EOSINOPHILS  --   --   --  0.00   BASOPHILS  --   --   --  0.00   ASTSGOT  --  98* 58*  --    ALTSGPT  --  105* 82*  --    ALKPHOSPHAT  --  104* 91  --    TBILIRUBIN  --  0.6 0.5  --      Recent Labs     09/24/22 0514 09/25/22 0908 09/26/22  0416   RBC 2.51* 2.90* 2.70*   HEMOGLOBIN 7.9* 9.2* 8.5*   HEMATOCRIT 22.5* 26.2* 24.4*   PLATELETCT 72* 99* 70*       Imaging  X-Ray:  I have personally reviewed the images and compared with prior images. and My impression is: Pulmonary edema is improved.  Persistent bibasilar opacities.    Assessment/Plan      Acute hypoxemic respiratory failure   Due to acute cardiogenic pulmonary edema and pneumonia due to SARS-CoV-2   On very high supplemental oxygen requiring a high flow nasal cannula   High risk of deterioration and the need for intubation and mechanical ventilatory support    Severe aortic stenosis   Cardiology evaluating candidacy for TAVR    Acute systolic heart failure   LVEF 20%   Force diuresis as tolerated    Pneumonia due to SARS-CoV-2   Fully vaccinated with Pfizer vaccine   Continue dexamethasone, 6 mg daily   Continue remdesivir as he is immunocompromise - monitor aminotransferases and renal function    CKD   Monitor renal function and urine output   Avoid nephrotoxins and renal dose medications    NHL on immunotherapy   Immunocompromised    Hypokalemia   Replete  potassium    Elevated aminotransferases   Due to ischemic hepatopathy from acute systolic heart failure   RUQ ultrasound unremarkable   Hepatitis panel negative   Trend liver enzymes and synthetic function   Avoid hepatotoxins      VTE:  Heparin  Ulcer: Not Indicated  Lines: Central Line  Ongoing indication addressed    I have performed a physical exam and reviewed and updated ROS and Plan today (9/26/2022). In review of yesterday's note (9/25/2022), there are no changes except as documented above.     I have assessed and reassessed his respiratory status, blood pressure, hemodynamics and cardiovascular status.  He is on very high supplemental oxygen requiring a high flow nasal cannula.  He is at increased risk for worsening respiratory system dysfunction and the need for intubation and mechanical ventilatory support.    Discussed patient condition and risk of morbidity and/or mortality with RN, RT, Pharmacy, Charge nurse / hot rounds, and QA team    The patient remains critically ill.  Critical care time = 40 minutes in directly providing and coordinating critical care and extensive data review.  No time overlap and excludes procedures.    A Critical Care Medicine progress note may have been authored by a resident physician or advanced practitioner of nursing under my direct supervision on this date of service.  As the supervising and attending physician, I have either attested to or cosigned that document.  IN THE EVENT THAT DISCREPANCIES EXIST BETWEEN THIS DOCUMENT AND ANY DOCUMENT THAT I HAVE ATTESTED TO OR COSIGNED ON THIS DATE OF SERVICE, THEN THIS DOCUMENT REMAINS THE FINAL AUTHORITY AS TO MY ASSESSMENT AND PLAN REGARDING THE CARE OF THIS PATIENT.    Ceferino Rodriguez MD  Pulmonary and Critical Care Medicine

## 2022-09-26 NOTE — CARE PLAN
Problem: Humidified High Flow Nasal Cannula  Goal: Maintain adequate oxygenation dependent on patient condition  Description: Target End Date:  resolve prior to discharge or when underlying condition is resolved/stabilized    1.  Implement humidified high flow oxygen therapy  2.  Titrate high flow oxygen to maintain appropriate SpO2  Outcome: Progressing  Flowsheets  Taken 9/25/2022 1725  Indication: All other patients: SpO2 less than 90% despite conventional supplemental oxygen devices  Outcome: Normoxia  Taken 9/25/2022 1441  O2 (LPM): 40  FiO2%: 60 %

## 2022-09-26 NOTE — ASSESSMENT & PLAN NOTE
Previous echo in 2019 showing EF of 70%  Latest echo with EF of 20% with inferolateral hypokinesis likely secondary to NSTEMI  Continue aspirin and furosemide  The question is to determine whether or not the symptoms of heart failure are caused by the aortic stenosis or another intrinsic insult such as hypokinesis  Cardio has been involved and has determine that the answer to the above point is a dobutamine echo

## 2022-09-27 LAB
ALBUMIN SERPL BCP-MCNC: 2.8 G/DL (ref 3.2–4.9)
ALBUMIN/GLOB SERPL: 1.3 G/DL
ALP SERPL-CCNC: 99 U/L (ref 30–99)
ALT SERPL-CCNC: 68 U/L (ref 2–50)
ANION GAP SERPL CALC-SCNC: 12 MMOL/L (ref 7–16)
AST SERPL-CCNC: 37 U/L (ref 12–45)
BACTERIA BLD CULT: NORMAL
BACTERIA BLD CULT: NORMAL
BASOPHILS # BLD AUTO: 0.2 % (ref 0–1.8)
BASOPHILS # BLD: 0.01 K/UL (ref 0–0.12)
BILIRUB SERPL-MCNC: 0.6 MG/DL (ref 0.1–1.5)
BUN SERPL-MCNC: 79 MG/DL (ref 8–22)
CALCIUM SERPL-MCNC: 8.4 MG/DL (ref 8.5–10.5)
CHLORIDE SERPL-SCNC: 103 MMOL/L (ref 96–112)
CO2 SERPL-SCNC: 23 MMOL/L (ref 20–33)
CREAT SERPL-MCNC: 1.95 MG/DL (ref 0.5–1.4)
EOSINOPHIL # BLD AUTO: 0 K/UL (ref 0–0.51)
EOSINOPHIL NFR BLD: 0 % (ref 0–6.9)
ERYTHROCYTE [DISTWIDTH] IN BLOOD BY AUTOMATED COUNT: 48 FL (ref 35.9–50)
GFR SERPLBLD CREATININE-BSD FMLA CKD-EPI: 36 ML/MIN/1.73 M 2
GLOBULIN SER CALC-MCNC: 2.2 G/DL (ref 1.9–3.5)
GLUCOSE SERPL-MCNC: 89 MG/DL (ref 65–99)
HCT VFR BLD AUTO: 27.4 % (ref 42–52)
HGB BLD-MCNC: 9.4 G/DL (ref 14–18)
IMM GRANULOCYTES # BLD AUTO: 0.04 K/UL (ref 0–0.11)
IMM GRANULOCYTES NFR BLD AUTO: 1 % (ref 0–0.9)
LYMPHOCYTES # BLD AUTO: 0.07 K/UL (ref 1–4.8)
LYMPHOCYTES NFR BLD: 1.7 % (ref 22–41)
MAGNESIUM SERPL-MCNC: 1.4 MG/DL (ref 1.5–2.5)
MCH RBC QN AUTO: 31.2 PG (ref 27–33)
MCHC RBC AUTO-ENTMCNC: 34.3 G/DL (ref 33.7–35.3)
MCV RBC AUTO: 91 FL (ref 81.4–97.8)
MONOCYTES # BLD AUTO: 0.22 K/UL (ref 0–0.85)
MONOCYTES NFR BLD AUTO: 5.3 % (ref 0–13.4)
NEUTROPHILS # BLD AUTO: 3.83 K/UL (ref 1.82–7.42)
NEUTROPHILS NFR BLD: 91.8 % (ref 44–72)
NRBC # BLD AUTO: 0 K/UL
NRBC BLD-RTO: 0 /100 WBC
PHOSPHATE SERPL-MCNC: 5.6 MG/DL (ref 2.5–4.5)
PLATELET # BLD AUTO: 74 K/UL (ref 164–446)
PMV BLD AUTO: 11.6 FL (ref 9–12.9)
POTASSIUM SERPL-SCNC: 3.6 MMOL/L (ref 3.6–5.5)
PROT SERPL-MCNC: 5 G/DL (ref 6–8.2)
RBC # BLD AUTO: 3.01 M/UL (ref 4.7–6.1)
SIGNIFICANT IND 70042: NORMAL
SIGNIFICANT IND 70042: NORMAL
SITE SITE: NORMAL
SITE SITE: NORMAL
SODIUM SERPL-SCNC: 138 MMOL/L (ref 135–145)
SOURCE SOURCE: NORMAL
SOURCE SOURCE: NORMAL
WBC # BLD AUTO: 4.2 K/UL (ref 4.8–10.8)

## 2022-09-27 PROCEDURE — 700102 HCHG RX REV CODE 250 W/ 637 OVERRIDE(OP): Performed by: INTERNAL MEDICINE

## 2022-09-27 PROCEDURE — 700102 HCHG RX REV CODE 250 W/ 637 OVERRIDE(OP): Performed by: STUDENT IN AN ORGANIZED HEALTH CARE EDUCATION/TRAINING PROGRAM

## 2022-09-27 PROCEDURE — A9270 NON-COVERED ITEM OR SERVICE: HCPCS | Performed by: INTERNAL MEDICINE

## 2022-09-27 PROCEDURE — 83735 ASSAY OF MAGNESIUM: CPT

## 2022-09-27 PROCEDURE — 99232 SBSQ HOSP IP/OBS MODERATE 35: CPT | Mod: GC | Performed by: INTERNAL MEDICINE

## 2022-09-27 PROCEDURE — 85025 COMPLETE CBC W/AUTO DIFF WBC: CPT

## 2022-09-27 PROCEDURE — 700111 HCHG RX REV CODE 636 W/ 250 OVERRIDE (IP): Mod: TB | Performed by: INTERNAL MEDICINE

## 2022-09-27 PROCEDURE — 99233 SBSQ HOSP IP/OBS HIGH 50: CPT | Performed by: INTERNAL MEDICINE

## 2022-09-27 PROCEDURE — 700111 HCHG RX REV CODE 636 W/ 250 OVERRIDE (IP): Performed by: INTERNAL MEDICINE

## 2022-09-27 PROCEDURE — 700111 HCHG RX REV CODE 636 W/ 250 OVERRIDE (IP): Performed by: STUDENT IN AN ORGANIZED HEALTH CARE EDUCATION/TRAINING PROGRAM

## 2022-09-27 PROCEDURE — 94640 AIRWAY INHALATION TREATMENT: CPT

## 2022-09-27 PROCEDURE — 99233 SBSQ HOSP IP/OBS HIGH 50: CPT | Mod: GC | Performed by: INTERNAL MEDICINE

## 2022-09-27 PROCEDURE — A9270 NON-COVERED ITEM OR SERVICE: HCPCS | Performed by: STUDENT IN AN ORGANIZED HEALTH CARE EDUCATION/TRAINING PROGRAM

## 2022-09-27 PROCEDURE — 84100 ASSAY OF PHOSPHORUS: CPT

## 2022-09-27 PROCEDURE — 700105 HCHG RX REV CODE 258: Performed by: INTERNAL MEDICINE

## 2022-09-27 PROCEDURE — 770000 HCHG ROOM/CARE - INTERMEDIATE ICU *

## 2022-09-27 PROCEDURE — 80053 COMPREHEN METABOLIC PANEL: CPT

## 2022-09-27 RX ORDER — HYDRALAZINE HYDROCHLORIDE 10 MG/1
10 TABLET, FILM COATED ORAL EVERY 8 HOURS
Status: DISCONTINUED | OUTPATIENT
Start: 2022-09-27 | End: 2022-09-29

## 2022-09-27 RX ORDER — MAGNESIUM SULFATE HEPTAHYDRATE 40 MG/ML
4 INJECTION, SOLUTION INTRAVENOUS ONCE
Status: COMPLETED | OUTPATIENT
Start: 2022-09-27 | End: 2022-09-27

## 2022-09-27 RX ORDER — POTASSIUM CHLORIDE 20 MEQ/1
40 TABLET, EXTENDED RELEASE ORAL ONCE
Status: COMPLETED | OUTPATIENT
Start: 2022-09-27 | End: 2022-09-27

## 2022-09-27 RX ADMIN — ASPIRIN 81 MG: 81 TABLET, COATED ORAL at 05:22

## 2022-09-27 RX ADMIN — MAGNESIUM SULFATE HEPTAHYDRATE 4 G: 40 INJECTION, SOLUTION INTRAVENOUS at 10:16

## 2022-09-27 RX ADMIN — HEPARIN SODIUM 5000 UNITS: 5000 INJECTION INTRAVENOUS; SUBCUTANEOUS at 21:03

## 2022-09-27 RX ADMIN — HEPARIN SODIUM 5000 UNITS: 5000 INJECTION INTRAVENOUS; SUBCUTANEOUS at 05:22

## 2022-09-27 RX ADMIN — DEXAMETHASONE 6 MG: 4 TABLET ORAL at 05:22

## 2022-09-27 RX ADMIN — DOXYCYCLINE 100 MG: 100 TABLET, FILM COATED ORAL at 17:36

## 2022-09-27 RX ADMIN — DOXYCYCLINE 100 MG: 100 TABLET, FILM COATED ORAL at 05:22

## 2022-09-27 RX ADMIN — HYDRALAZINE HYDROCHLORIDE 10 MG: 10 TABLET, FILM COATED ORAL at 13:55

## 2022-09-27 RX ADMIN — ACYCLOVIR 400 MG: 400 TABLET ORAL at 05:22

## 2022-09-27 RX ADMIN — HEPARIN SODIUM 5000 UNITS: 5000 INJECTION INTRAVENOUS; SUBCUTANEOUS at 13:56

## 2022-09-27 RX ADMIN — REMDESIVIR 100 MG: 100 INJECTION, POWDER, LYOPHILIZED, FOR SOLUTION INTRAVENOUS at 17:36

## 2022-09-27 RX ADMIN — HYDRALAZINE HYDROCHLORIDE 10 MG: 10 TABLET, FILM COATED ORAL at 21:03

## 2022-09-27 RX ADMIN — POTASSIUM CHLORIDE 40 MEQ: 1500 TABLET, EXTENDED RELEASE ORAL at 10:16

## 2022-09-27 ASSESSMENT — ENCOUNTER SYMPTOMS
TREMORS: 0
BACK PAIN: 0
PALPITATIONS: 0
MYALGIAS: 0
HALLUCINATIONS: 0
TINGLING: 0
WEIGHT LOSS: 0
HEADACHES: 0
SPEECH CHANGE: 0
ABDOMINAL PAIN: 0
EYE PAIN: 0
VOMITING: 0
PHOTOPHOBIA: 0
SPUTUM PRODUCTION: 1
DIARRHEA: 0
SHORTNESS OF BREATH: 1
CHILLS: 0
COUGH: 1
BLURRED VISION: 0
CONSTIPATION: 0
DOUBLE VISION: 0
DIZZINESS: 0
NAUSEA: 0
ORTHOPNEA: 0
NECK PAIN: 0
FEVER: 0
FOCAL WEAKNESS: 0
SENSORY CHANGE: 0

## 2022-09-27 ASSESSMENT — PAIN DESCRIPTION - PAIN TYPE
TYPE: ACUTE PAIN

## 2022-09-27 ASSESSMENT — LIFESTYLE VARIABLES: SUBSTANCE_ABUSE: 0

## 2022-09-27 ASSESSMENT — FIBROSIS 4 INDEX
FIB4 SCORE: 4.43
FIB4 SCORE: 4.43

## 2022-09-27 NOTE — PROGRESS NOTES
4 Eyes Skin Assessment Completed by MARCOS Frank and MARCOS Mondragon.    Head WDL  Ears WDL  Nose WDL  Mouth WDL  Neck WDL  Breast/Chest WDL  Shoulder Blades WDL  Spine WDL  (R) Arm/Elbow/Hand Bruising  (L) Arm/Elbow/Hand Bruising  Abdomen Bruising  Groin WDL  Scrotum/Coccyx/Buttocks Redness and Blanching  (R) Leg Bruising  (L) Leg Bruising  (R) Heel/Foot/Toe Redness and Blanching  (L) Heel/Foot/Toe Redness and Blanching          Devices In Places ECG, Tele Box, Blood Pressure Cuff, Pulse Ox, and HFNC      Interventions In Place Pillows and Pressure Redistribution Mattress    Possible Skin Injury No    Pictures Uploaded Into Epic N/A  Wound Consult Placed N/A  RN Wound Prevention Protocol Ordered No

## 2022-09-27 NOTE — PROGRESS NOTES
PROGRESS NOTE    Reason for Consult:  Asked by Dr Ceferino Rodriguez, to see this patient acute respiratory failure, HFrEF with EF of 20% and NSTEMI.     CC:  Loss of appetite, fatigue and weakness    HPI:  Mr. Ceferino Fitch is a 72 year old male who was transferred from an outside facility on 9/21 for elevated troponin and new T wave abnormalities. Patient had a recent hospitalization from 9/13-9/15 for [place]LEY and COVID-19 infection. He has a past medical history including, hypertension, hyperlipidemia, aortic stenosis and follicular lymphoma on immunotherapy.  Patient developed respiratory failure in the setting COVID-19 and heart failure. Patient was started on IV lasix, but continued to have worsening respiratory functioning. ECHO showed 20% EF and concern for worsening aortic stenosis. CTS was consulted. Dexamethasone was continued and remdesivir was started for his COVID-19 infection.   Patient began to have worsening hypoxic respiratory failure and was started on high flow nasal cannula. A [place] catheter was placed on 9/25 to better assess heart failure vs. COVID-19 cause of pulmonary edema and hypoxic respiratory failure.   [place] catheter was removed on 9/27, lasix discontinued. Patient continues to appear euvolemic. He is currently on 40 L/min high flow and 60%. States that he is feeling better. No chest pain, palpitations, lightheadedness or dizziness. No nausea or vomiting. Continues to be short of breath, but believes that he is improving.           Medications / Drug list prior to admission:  No current facility-administered medications on file prior to encounter.     Current Outpatient Medications on File Prior to Encounter   Medication Sig Dispense Refill    acetaminophen (TYLENOL) 325 MG Tab Take 325 mg by mouth one time as needed.      Cyanocobalamin 2000 MCG Tab Take 1 Tablet by mouth every day.      acyclovir (ZOVIRAX) 400 MG tablet Take 400 mg by mouth every day.      allopurinol (ZYLOPRIM) 100  MG Tab Take 100 mg by mouth every day.      aspirin 81 MG EC tablet Take 81 mg by mouth every day.      vitamin D (VITAMIND D3) 1000 UNIT Tab Take 2,000 Units by mouth every day.      rosuvastatin (CRESTOR) 20 MG Tab Take 20 mg by mouth every day.         Current list of administered Medications:    Current Facility-Administered Medications:     potassium chloride SA (Kdur) tablet 40 mEq, 40 mEq, Oral, Once, Sally Mondragon M.D.    magnesium sulfate IVPB premix 4 g, 4 g, Intravenous, Once, Sally Mondragon M.D.    acyclovir (Zovirax) tablet 400 mg, 400 mg, Oral, DAILY, Sally Mondragon M.D., 400 mg at 09/27/22 0522    senna-docusate (PERICOLACE or SENOKOT S) 8.6-50 MG per tablet 2 Tablet, 2 Tablet, Oral, BID **AND** polyethylene glycol/lytes (MIRALAX) PACKET 1 Packet, 1 Packet, Oral, QDAY PRN **AND** magnesium hydroxide (MILK OF MAGNESIA) suspension 30 mL, 30 mL, Oral, QDAY PRN **AND** bisacodyl (DULCOLAX) suppository 10 mg, 10 mg, Rectal, QDAY PRN, Sally Mondragon M.D.    remdesivir (Veklury) 100 mg in  mL IVPB, 100 mg, Intravenous, DAILY AT 1800, Krystal Butler M.D., Stopped at 09/26/22 1757    aspirin EC (ECOTRIN) tablet 81 mg, 81 mg, Oral, DAILY, Jennifer Villarreal M.D., 81 mg at 09/27/22 0522    heparin injection 5,000 Units, 5,000 Units, Subcutaneous, Q8HRS, Jennifer Villarreal M.D., 5,000 Units at 09/27/22 0522    dexamethasone (DECADRON) tablet 6 mg, 6 mg, Oral, DAILY, Judah Chen M.D., 6 mg at 09/27/22 0522    doxycycline monohydrate (ADOXA) tablet 100 mg, 100 mg, Oral, Q12HRS, Judah Chen M.D., 100 mg at 09/27/22 0522    Past Medical History:   Diagnosis Date    Cancer (HCC)     Follicular lymphoma (HCC)     Hypertension        No past surgical history on file.    No family history on file.  Patient family history was personally reviewed, no pertinent family history to current presentation    Social History     Socioeconomic History    Marital status:      Spouse name: Not  on file    Number of children: Not on file    Years of education: Not on file    Highest education level: Not on file   Occupational History    Not on file   Tobacco Use    Smoking status: Former     Passive exposure: Past    Smokeless tobacco: Never   Vaping Use    Vaping Use: Not on file   Substance and Sexual Activity    Alcohol use: Not Currently     Alcohol/week: 3.0 oz     Types: 5 Cans of beer per week    Drug use: Never    Sexual activity: Not on file   Other Topics Concern    Not on file   Social History Narrative    Not on file     Social Determinants of Health     Financial Resource Strain: Not on file   Food Insecurity: Not on file   Transportation Needs: Not on file   Physical Activity: Not on file   Stress: Not on file   Social Connections: Not on file   Intimate Partner Violence: Not on file   Housing Stability: Not on file       ALLERGIES:  Allergies   Allergen Reactions    Penicillins Rash and Unspecified     Hoarseness         Review of systems:  A complete review of symptoms was reviewed with patient.  This is reviewed in H&P and PMH. ALL OTHERS reviewed and negative    Physical exam:  Patient Vitals for the past 24 hrs:   BP Temp Temp src Pulse Resp SpO2 Weight   09/26/22 0646 -- -- -- 70 (!) 24 95 % --   09/26/22 0600 (!) 96/53 -- -- 72 20 91 % --   09/26/22 0500 127/63 -- -- (!) 58 18 94 % --   09/26/22 0400 110/58 36.2 °C (97.2 °F) Temporal 70 18 97 % --   09/26/22 0300 110/54 -- -- 64 20 93 % 66.3 kg (146 lb 2.6 oz)   09/26/22 0214 -- -- -- 72 (!) 21 97 % --   09/26/22 0200 104/56 -- -- 62 20 -- --   09/26/22 0100 106/57 -- -- 67 20 93 % --   09/26/22 0000 118/59 36.2 °C (97.1 °F) Temporal 61 17 96 % --   09/25/22 2300 128/62 -- -- 72 20 -- --   09/25/22 2200 125/58 -- -- 61 20 95 % --   09/25/22 2118 -- -- -- 69 (!) 22 93 % --   09/25/22 2100 126/60 -- -- 60 18 -- --   09/25/22 2000 117/58 36 °C (96.8 °F) Temporal 69 19 95 % --   09/25/22 1900 119/57 -- -- 68 19 92 % --   09/25/22 1811 -- --  -- 68 (!) 27 96 % --   09/25/22 1800 115/56 -- -- 68 17 96 % --   09/25/22 1700 -- 36 °C (96.8 °F) Temporal 72 20 97 % --   09/25/22 1600 (!) 83/60 -- -- 77 (!) 26 92 % --   09/25/22 1500 115/54 -- -- 68 18 98 % --   09/25/22 1441 -- -- -- 73 (!) 22 99 % --   09/25/22 1400 110/53 -- -- 69 19 99 % --   09/25/22 1300 111/53 -- -- 66 (!) 21 99 % --   09/25/22 1200 (!) 157/70 -- -- 85 (!) 23 96 % --   09/25/22 1134 -- -- -- 80 (!) 24 100 % --   09/25/22 1100 126/58 -- -- 67 17 100 % --   09/25/22 1047 -- (!) 35.7 °C (96.3 °F) Temporal -- -- -- 66 kg (145 lb 8.1 oz)   09/25/22 0830 -- -- -- -- -- 90 % --   09/25/22 0801 116/57 36.6 °C (97.8 °F) Temporal 84 18 95 % --     General: No acute distress, sitting in hospital bed with high flow nasal cannula (40 L, 60% O2)  EYES: no jaundice  HEENT: OP clear   Neck: No JVD.   CVS:  RRR. S1 + S2. Systolic crescendo-decrescendo murmur 3/6  Resp: CTAB. Crackles/ rhonchi bilaterally, no edema present bilaterally  Abdomen: Soft, NT, ND,  Skin: Grossly nothing acute no obvious rashes  Neurological: Alert, Moves all extremities, no cranial nerve defects on limited exam  Extremities: Pulse 2+ in b/l LE. No cyanosis.     Data:  Laboratory studies personally reviewed by me:  Recent Results (from the past 24 hour(s))   CBC WITH DIFFERENTIAL    Collection Time: 09/27/22  5:30 AM   Result Value Ref Range    WBC 4.2 (L) 4.8 - 10.8 K/uL    RBC 3.01 (L) 4.70 - 6.10 M/uL    Hemoglobin 9.4 (L) 14.0 - 18.0 g/dL    Hematocrit 27.4 (L) 42.0 - 52.0 %    MCV 91.0 81.4 - 97.8 fL    MCH 31.2 27.0 - 33.0 pg    MCHC 34.3 33.7 - 35.3 g/dL    RDW 48.0 35.9 - 50.0 fL    Platelet Count 74 (L) 164 - 446 K/uL    MPV 11.6 9.0 - 12.9 fL    Neutrophils-Polys 91.80 (H) 44.00 - 72.00 %    Lymphocytes 1.70 (L) 22.00 - 41.00 %    Monocytes 5.30 0.00 - 13.40 %    Eosinophils 0.00 0.00 - 6.90 %    Basophils 0.20 0.00 - 1.80 %    Immature Granulocytes 1.00 (H) 0.00 - 0.90 %    Nucleated RBC 0.00 /100 WBC    Neutrophils  (Absolute) 3.83 1.82 - 7.42 K/uL    Lymphs (Absolute) 0.07 (L) 1.00 - 4.80 K/uL    Monos (Absolute) 0.22 0.00 - 0.85 K/uL    Eos (Absolute) 0.00 0.00 - 0.51 K/uL    Baso (Absolute) 0.01 0.00 - 0.12 K/uL    Immature Granulocytes (abs) 0.04 0.00 - 0.11 K/uL    NRBC (Absolute) 0.00 K/uL   Comp Metabolic Panel    Collection Time: 09/27/22  5:30 AM   Result Value Ref Range    Sodium 138 135 - 145 mmol/L    Potassium 3.6 3.6 - 5.5 mmol/L    Chloride 103 96 - 112 mmol/L    Co2 23 20 - 33 mmol/L    Anion Gap 12.0 7.0 - 16.0    Glucose 89 65 - 99 mg/dL    Bun 79 (H) 8 - 22 mg/dL    Creatinine 1.95 (H) 0.50 - 1.40 mg/dL    Calcium 8.4 (L) 8.5 - 10.5 mg/dL    AST(SGOT) 37 12 - 45 U/L    ALT(SGPT) 68 (H) 2 - 50 U/L    Alkaline Phosphatase 99 30 - 99 U/L    Total Bilirubin 0.6 0.1 - 1.5 mg/dL    Albumin 2.8 (L) 3.2 - 4.9 g/dL    Total Protein 5.0 (L) 6.0 - 8.2 g/dL    Globulin 2.2 1.9 - 3.5 g/dL    A-G Ratio 1.3 g/dL   MAGNESIUM    Collection Time: 09/27/22  5:30 AM   Result Value Ref Range    Magnesium 1.4 (L) 1.5 - 2.5 mg/dL   PHOSPHORUS    Collection Time: 09/27/22  5:30 AM   Result Value Ref Range    Phosphorus 5.6 (H) 2.5 - 4.5 mg/dL   ESTIMATED GFR    Collection Time: 09/27/22  5:30 AM   Result Value Ref Range    GFR (CKD-EPI) 36 (A) >60 mL/min/1.73 m 2         All pertinent features of laboratory and imaging reviewed including primary images where applicable      Principal Problem:    Acute respiratory failure with hypoxia (HCC) POA: Unknown  Active Problems:    Follicular lymphoma grade I (HCC) POA: Yes    Essential hypertension, benign POA: Yes      Overview: Last Assessment & Plan:       Formatting of this note might be different from the original.      Known white-coat syndrome, BP at home 120's/80's    NSTEMI (non-ST elevated myocardial infarction) (HCC) POA: Yes    COVID POA: Unknown    Low bicarbonate POA: Unknown    Hypokalemia POA: Unknown    Pancytopenia (HCC) POA: Unknown    CKD (chronic kidney disease) POA:  Unknown    Elevated brain natriuretic peptide (BNP) level POA: Unknown    Elevated liver enzymes POA: Unknown    Advance care planning POA: Unknown    Heart failure with reduced ejection fraction (HCC) POA: Unknown  Resolved Problems:    * No resolved hospital problems. *      Assessment / Plan:   Mr. Ceferino Fitch is a 72 year old male who presented with shortness of breath and fatigue. He is COVID-19 positive and has had worsening hypoxic respiratory failure. There has been concern that his pulmonary edema is related to his heart failure, but patient has been euvolemic and continues to have increased oxygen need. [place] catheter indicated pulmonary involvement vs. Heart failure. CVP 1 mm Hg, Pulmonary artery mean pressure 15 mmHg and pulmonary wedge pressure of 6 mmHg points to pulmonary cause, likely COVID-19.   Concern for secondary infections, given immunocompromised state.   Aortic stenosis is low volume and low flow, so unable to determine severity. IV Lasix has been stopped and patient continues to be euvolemic.     Plan:  -Dobutamine stress echo in future, to determine   -Consider optimizing heart failure medication management when kidney function improves  -TAVR replacement after improvement of infection        I personally discussed his case with Dr. Marcos Ball    It is my pleasure to participate in the care of Mr. Fitch.  Please do not hesitate to contact me with questions or concerns.    Fabiana Isabel MD, PGY2  Cardiologist [facility]

## 2022-09-27 NOTE — CARE PLAN
Problem: Humidified High Flow Nasal Cannula  Goal: Maintain adequate oxygenation dependent on patient condition  Description: Target End Date:  resolve prior to discharge or when underlying condition is resolved/stabilized    1.  Implement humidified high flow oxygen therapy  2.  Titrate high flow oxygen to maintain appropriate SpO2  Outcome: Progressing  Flowsheets  Taken 9/27/2022 0214 by Paris Valentin, RRT  O2 ([facility]): 40  FiO2%: 60 %  Indication: All other patients: SpO2 less than 90% despite conventional supplemental oxygen devices  Outcome: Normoxia

## 2022-09-27 NOTE — PROGRESS NOTES
Hospital Medicine Daily Progress Note    Date of Service  9/27/2022    Chief Complaint  Ceferino Fitch is a 72 y.o. male admitted 9/21/2022 with elevated troponin     Hospital Course  This is a a 72 y.o. 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    Patient initially presented to OSH and was transferred here for high level of care.  Cardiology has been consulted and patient admitted for further work up and treatment.  Due to high oxygen requirement patient transferred to ICU on September 25, 2022.  Cardiology consulted and he underwent right heart catheterization on September 25, 2022.  Cardiology and critical care has been following him.  He transferred to [facility] level of care on September 27, 2022.    Interval Problem Update    I evaluated and examined him at the bedside.  He reported that he is feeling better.  He reported mild productive cough.  Currently he is requiring high flow nasal cannula oxygen to maintain oxygen saturation.  He denies symptoms of chest pain, nausea, vomiting.  He expressed that he does not have taste sensation.  He reported decreased appetite.  I discussed plan of care with patient and his wife at the bedside and answered all of their questions.  Plan is to titrate down oxygen as tolerated.  Continue dexamethasone.  Cardiology has been following him.  Appreciate cardiology recommendations.    I discussed plan of care with bedside RN.    I have discussed this patient's plan of care and discharge plan at IDT rounds today with Case Management, Nursing, Nursing leadership, and other members of the IDT team.    Consultants/Specialty  cardiology  Critical care   Code Status  Full Code    Disposition  Patient is not medically cleared for discharge.   Anticipate discharge to to home with close  outpatient follow-up.  I have placed the appropriate orders for post-discharge needs.    Review of Systems  Review of Systems   Constitutional:  Negative for chills, fever and weight loss.   HENT:  Negative for hearing loss and tinnitus.    Eyes:  Negative for blurred vision, double vision, photophobia and pain.   Respiratory:  Positive for cough, sputum production and shortness of breath.    Cardiovascular:  Negative for chest pain, palpitations, orthopnea and leg swelling.   Gastrointestinal:  Negative for abdominal pain, constipation, diarrhea, nausea and vomiting.   Genitourinary:  Negative for dysuria, frequency and urgency.   Musculoskeletal:  Negative for back pain, joint pain, myalgias and neck pain.   Skin:  Negative for rash.   Neurological:  Negative for dizziness, tingling, tremors, sensory change, speech change, focal weakness and headaches.        Loss of taste sensation   Psychiatric/Behavioral:  Negative for hallucinations and substance abuse.    All other systems reviewed and are negative.     Physical Exam  Temp:  [35.8 °C (96.5 °F)-36.4 °C (97.6 °F)] 36 °C (96.8 °F)  Pulse:  [] 67  Resp:  [14-31] 21  BP: ()/(44-62) 94/51  SpO2:  [90 %-98 %] 98 %    Physical Exam  Vitals reviewed.   Constitutional:       General: He is not in acute distress.     Comments: He was laying in bed without any acute distress.   HENT:      Head: Normocephalic and atraumatic. No contusion.      Right Ear: External ear normal.      Left Ear: External ear normal.      Nose: Nose normal.      Comments: High flow nasal cannula     Mouth/Throat:      Pharynx: No oropharyngeal exudate.   Eyes:      General:         Right eye: No discharge.         Left eye: No discharge.      Pupils: Pupils are equal, round, and reactive to light.   Cardiovascular:      Rate and Rhythm: Normal rate and regular rhythm.      Heart sounds: No murmur heard.    No friction rub. No gallop.   Pulmonary:      Effort: Pulmonary effort is  normal.      Breath sounds: No wheezing or rhonchi.   Abdominal:      General: Bowel sounds are normal. There is no distension.      Palpations: Abdomen is soft.      Tenderness: There is no abdominal tenderness. There is no rebound.   Musculoskeletal:         General: No swelling or tenderness. Normal range of motion.      Cervical back: No rigidity. No muscular tenderness.   Skin:     General: Skin is warm and dry.      Coloration: Skin is not jaundiced.   Neurological:      General: No focal deficit present.      Mental Status: He is alert and oriented to person, place, and time.      Cranial Nerves: No cranial nerve deficit.      Sensory: No sensory deficit.      Comments: Motor 5/5 in all 4 extremities   Psychiatric:         Mood and Affect: Mood normal.       Fluids    Intake/Output Summary (Last 24 hours) at 9/27/2022 1415  Last data filed at 9/27/2022 1016  Gross per 24 hour   Intake 950 ml   Output 1425 ml   Net -475 ml         Laboratory  Recent Labs     09/25/22  0908 09/26/22  0416 09/27/22  0530   WBC 5.3 3.1* 4.2*   RBC 2.90* 2.70* 3.01*   HEMOGLOBIN 9.2* 8.5* 9.4*   HEMATOCRIT 26.2* 24.4* 27.4*   MCV 90.3 90.4 91.0   MCH 31.7 31.5 31.2   MCHC 35.1 34.8 34.3   RDW 47.3 48.1 48.0   PLATELETCT 99* 70* 74*   MPV 11.0 11.1 11.6       Recent Labs     09/25/22  0908 09/26/22  0321 09/27/22  0530   SODIUM 138 140 138   POTASSIUM 3.5* 3.5* 3.6   CHLORIDE 102 104 103   CO2 19* 21 23   GLUCOSE 107* 104* 89   BUN 63* 71* 79*   CREATININE 2.09* 2.03* 1.95*   CALCIUM 8.2* 8.0* 8.4*                         Imaging  DX-CHEST-PORTABLE (1 VIEW)   Final Result      1.  Right-sided Lowry-Frannie catheter tip projects in the distal right main pulmonary artery region. No pneumothorax.   2.  Mild interval worsening in hazy bilateral pulmonary opacities.         DX-CHEST-PORTABLE (1 VIEW)   Final Result      1.  Right-sided Low[place]-Frannie catheter tip projects in the distal right main pulmonary artery region. No pneumothorax.   2.   Probable slight improvement in hazy bilateral pulmonary opacities.      DX-CHEST-PORTABLE (1 VIEW)   Final Result      1.  Right-sided PICC line unchanged in position.      2.  Increased interstitial markings throughout the lung fields especially in the perihilar regions consistent with pulmonary edema and/or pneumonitis.      CT-TAVR CHEST-ABD-PELVIS W/WO POST PROCESS   Final Result      1.  Limited by lack of intravenous contrast      2.  Aortic valve Agatston score 4565 and the annulus measures approximately 450 sq mm      3.  Coronary arteries originate over a centimeter above the annulus      4.  Severe atherosclerotic change with marked narrowing of the iliofemoral runoff, right worse than left      5.  Multifocal groundglass greater than bronchial thickening is compatible with known Covid pneumonia. There is also evidence of lower lung zone scarring, possible interstitial lung disease and small pleural fluid      6.  Colonic diverticulosis, emphysema, left heart enlargement      DX-CHEST-PORTABLE (1 VIEW)   Final Result      Unchanged BILATERAL pneumonia      EC-ECHOCARDIOGRAM COMPLETE W/O CONT   Final Result      DX-CHEST-LIMITED (1 VIEW)   Final Result      1.  Multifocal pneumonia. Pulmonary edema could have a similar appearance.      US-RUQ   Final Result      Unremarkable RIGHT upper quadrant ultrasound             Assessment/Plan  * Acute respiratory failure with hypoxia (HCC)  Assessment & Plan  Respiratory failure in setting of covid -19 vs ? HF   Continue to  monitor oxygen saturation closely  Incentive spirometry  Continue  dexamethasone  His oxygen requirement trending up now on high flow.  Plan is to titrate down high flow.  I have discussed case with infection disease and patient has been started on remdesivir   I discussed plan of care with patient and answered all his questions.    Heart failure with reduced ejection fraction (HCC)  Assessment & Plan  Echo showed severely reduced ejection  fraction   Cardiology is following him.      Advance care planning  Assessment & Plan  Discussed by prior treatment team.  had a prolonged discussion with the patient regarding goals of care, diagnoses, prognosis, and CODE STATUS. We discussed his   prognosis and comorbidities. I spent  16  minutes on advanced care planning in addition to the time spent managing the other medical problems.    He requested to remain full code      Elevated liver enzymes  Assessment & Plan  Hepatitis panel came back negative.  Liver enzyme has been trending down.  right upper quadrant ultrasound did not show any acute abnormalities.      Elevated brain natriuretic peptide (BNP) level  Assessment & Plan  Likely in setting of COVID-19 infection  Euvolemic on exam  Echo showed reduced ejection fraction.    CKD (chronic kidney disease)  Assessment & Plan  Renal function remained stable compared to his labs  Continue to monitor  Avoid nephrotoxin    Pancytopenia (HCC)  Assessment & Plan  In setting of immunotherapy  Continue to monitor    Hypokalemia  Assessment & Plan  Replace as needed.  Continue to monitor.      Low bicarbonate  Assessment & Plan  In setting of CKD  Now resolved    COVID  Assessment & Plan    Activate sepsis protocol  COVID Isolation  NC 2 LPM keep O2 Sat >92%currently on nasal cannula  Continue on Decadron  Monitor oxygen saturation closely      NSTEMI (non-ST elevated myocardial infarction) (HCC)- (present on admission)  Assessment & Plan    Echo showing ef of 20% also severer Aortic stenosis  Cardiology following  No symptoms of acute chest pain.      Essential hypertension, benign- (present on admission)  Assessment & Plan  Mild hypotension.  Continue to monitor.    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    last immunotherapy infusion to treat his cancer in [place] on 09/09/2022  Follow-up with  outpatient    I discussed plan of care with patient and answered all his questions.  Currently he is requiring high flow nasal cannula to maintain his oxygen saturation.  I discussed plan of care with bedside RN.        VTE prophylaxis: heparin ppx    I have performed a physical exam and reviewed and updated ROS and Plan today (9/27/2022). In review of yesterday's note (9/26/2022), there are no changes except as documented above.

## 2022-09-27 NOTE — PROGRESS NOTES
Pt was transferred from [facility] room 28 to [facility] room 603.    Pt was transferred by RN, Resp, CNA, pt was able to transfer without incident.  Pt is COVID (+) and all signage, isolation carts, were placed outside of room.    Pt states and shows no signs of pain, SOB, or distress were noted, and all safety measures are in place.    2 RN skin check was performed by Primary RN and Charge RN and documented.  CHG bath was performed, new linen and gown replaced.    At this time pt is stable, vital signs WNL, states and shows no pain, sob, or distress.  Bed is low, locked, SR up x 2, call light is in reach.  Presently pt's wife Jenniffer is at bedside, and pt education was performed and all questions were answered.    Will continue to monitor.

## 2022-09-27 NOTE — CONSULTS
"Reason for PC Consult: Advance Care Planning    Consulted by: Dr. Villarreal    Assessment:  General:   Per Dr. Niurka Mondragon, \"72 y.o. male with history significant for recent BRADLEY and COVID-19 infection, hypertension, hyperlipidemia, aortic stenosis and follicular lymphoma on immunotherapy. He presented on 09/21/2022 with NSTEMI and worsening SOB and echo showing EF 0f 20% with infelateral hypokinesis. Cardiology was consulted with placement of PA catheter, and he started being evaluated for a TAVR. He was started on decadron, doxycycline, lasix and remdesevir with some symptomatic improvement.\"    Dyspnea: Yes- HFNC  Last BM: 09/25/22  Pain: No  Depression: Mood appropriate for situation  Dementia: No    Spiritual:  Is Shinto or spirituality important for coping with this illness? Unable to determine  Has a  or spiritual provider visit been requested?      Palliative Performance Scale: 60%    Advance Directive: None on file. Created during this encounter.   DPOA: No  POLST: No    Code Status: Full    Social: pt lives at home in [place] with his wife Jenniffer. Pt has two adult children, Raul in [place] and a daughter in [place].     Outcome:  Discussed pt with Dr. Rodriguez prior to consult, appreciate updates. Introduced self and role of Palliative Care to Ceferino and his wife Jenniffer at bedside.  Assessed pt's understanding of current medical status, overall health picture, and options for future care. Pt reports that he has COVID and understands that he has a diagnosis of aortic stenosis. Pt reports that he has been receiving infusions for his follicular lymphoma. The infusions typically don't cause any adverse side effects, however after his last infusion pt was abnormally fatigued for several days. Pt reports being independent and active at baseline, pt has had a prolonged cough after getting COVID in July.     Explored pt's values, beliefs, and preferences in order to identify GOC. PC RN " queried if pt has discussed healthcare wishes with family or completed Advance Directives in the past. Pt has not, but he would be interested in doing so in the hospital. Pt named his wife Jenniffer as DPOA, son Raul as alternate, and brother Zi as second alternate. Pt chose #2,3,5 on statements of desires, meaning that he would not want life-prolonging treatments to be provided, if there was no reasonable hope of recovery, long-term survival, or if the patient was in an irreversible coma. Patient would also want DPOA-HC to consider quality of life, relief of suffering, and preservation of function.     Discussed code status in detail including mechanical ventilation and what resuscitation looks like. Pt states that he discussed this with a physician earlier in his admission. Pt would like to remain a full code. Pt feels he would want CPR or intubation attempted and that Jenniffer would make good decisions for him if these attempts had poor outcomes. Pt expresses to his wife that he would not want prolonged intubation if there were not potential for meaningful recovery.     Active listening, reflection, reminiscing, validation & normalization, and empathic support utilized throughout this encounter.  All questions answered.  PC contact information given.       Updated: SHAYLEE RODRÍGUEZ and Dr. Niurka Mondragon    Plan: will get AD notarized tomorrow.       Thank you for allowing Palliative Care to participate in this patient's care. Please feel free to call [phone] with any questions or concerns.

## 2022-09-27 NOTE — CARE PLAN
The patient is Watcher - Medium risk of patient condition declining or worsening    Shift Goals  Clinical Goals: Stable O2, wean off HFNC  Patient Goals: Rest and sleep  Family Goals: KARL    Progress made toward(s) clinical / shift goals:      Problem: Knowledge Deficit - Standard  Goal: Patient and family/care givers will demonstrate understanding of plan of care, disease process/condition, diagnostic tests and medications  Outcome: Progressing     Problem: Hemodynamics  Goal: Patient's hemodynamics, fluid balance and neurologic status will be stable or improve  Outcome: Progressing     Problem: Fall Risk  Goal: Patient will remain free from falls  Outcome: Progressing     Problem: Respiratory  Goal: Patient will achieve/maintain optimum respiratory ventilation and gas exchange  Outcome: Progressing     Problem: Pain - Standard  Goal: Alleviation of pain or a reduction in pain to the patient’s comfort goal  Outcome: Progressing       Patient is not progressing towards the following goals:

## 2022-09-27 NOTE — ASSESSMENT & PLAN NOTE
Echo showed severely reduced ejection fraction   Cardiology is following him.  Monitor volume status; on IV lasix  Need to be cautious with diuresis given AS  Afterload reduction on hydralazine  Initiate GDMT as pressures allow  Will reconsult Cards for Dobutamine stress test once off HFNC

## 2022-09-28 LAB
ALBUMIN SERPL BCP-MCNC: 2.7 G/DL (ref 3.2–4.9)
ALBUMIN/GLOB SERPL: 1.2 G/DL
ALP SERPL-CCNC: 102 U/L (ref 30–99)
ALT SERPL-CCNC: 54 U/L (ref 2–50)
ANION GAP SERPL CALC-SCNC: 17 MMOL/L (ref 7–16)
AST SERPL-CCNC: 24 U/L (ref 12–45)
BASOPHILS # BLD AUTO: 0 % (ref 0–1.8)
BASOPHILS # BLD: 0 K/UL (ref 0–0.12)
BILIRUB SERPL-MCNC: 0.6 MG/DL (ref 0.1–1.5)
BUN SERPL-MCNC: 74 MG/DL (ref 8–22)
CALCIUM SERPL-MCNC: 8.1 MG/DL (ref 8.5–10.5)
CHLORIDE SERPL-SCNC: 103 MMOL/L (ref 96–112)
CO2 SERPL-SCNC: 20 MMOL/L (ref 20–33)
CREAT SERPL-MCNC: 1.73 MG/DL (ref 0.5–1.4)
EOSINOPHIL # BLD AUTO: 0 K/UL (ref 0–0.51)
EOSINOPHIL NFR BLD: 0 % (ref 0–6.9)
ERYTHROCYTE [DISTWIDTH] IN BLOOD BY AUTOMATED COUNT: 46.8 FL (ref 35.9–50)
GFR SERPLBLD CREATININE-BSD FMLA CKD-EPI: 41 ML/MIN/1.73 M 2
GLOBULIN SER CALC-MCNC: 2.3 G/DL (ref 1.9–3.5)
GLUCOSE SERPL-MCNC: 92 MG/DL (ref 65–99)
HCT VFR BLD AUTO: 27.8 % (ref 42–52)
HGB BLD-MCNC: 9.6 G/DL (ref 14–18)
IMM GRANULOCYTES # BLD AUTO: 0.05 K/UL (ref 0–0.11)
IMM GRANULOCYTES NFR BLD AUTO: 1.1 % (ref 0–0.9)
LYMPHOCYTES # BLD AUTO: 0.06 K/UL (ref 1–4.8)
LYMPHOCYTES NFR BLD: 1.3 % (ref 22–41)
MAGNESIUM SERPL-MCNC: 2.6 MG/DL (ref 1.5–2.5)
MCH RBC QN AUTO: 31.3 PG (ref 27–33)
MCHC RBC AUTO-ENTMCNC: 34.5 G/DL (ref 33.7–35.3)
MCV RBC AUTO: 90.6 FL (ref 81.4–97.8)
MONOCYTES # BLD AUTO: 0.2 K/UL (ref 0–0.85)
MONOCYTES NFR BLD AUTO: 4.4 % (ref 0–13.4)
NEUTROPHILS # BLD AUTO: 4.19 K/UL (ref 1.82–7.42)
NEUTROPHILS NFR BLD: 93.2 % (ref 44–72)
NRBC # BLD AUTO: 0 K/UL
NRBC BLD-RTO: 0 /100 WBC
PHOSPHATE SERPL-MCNC: 4.7 MG/DL (ref 2.5–4.5)
PLATELET # BLD AUTO: 66 K/UL (ref 164–446)
PMV BLD AUTO: 11.7 FL (ref 9–12.9)
POTASSIUM SERPL-SCNC: 4.3 MMOL/L (ref 3.6–5.5)
PROT SERPL-MCNC: 5 G/DL (ref 6–8.2)
RBC # BLD AUTO: 3.07 M/UL (ref 4.7–6.1)
SODIUM SERPL-SCNC: 140 MMOL/L (ref 135–145)
WBC # BLD AUTO: 4.5 K/UL (ref 4.8–10.8)

## 2022-09-28 PROCEDURE — A9270 NON-COVERED ITEM OR SERVICE: HCPCS | Performed by: STUDENT IN AN ORGANIZED HEALTH CARE EDUCATION/TRAINING PROGRAM

## 2022-09-28 PROCEDURE — A9270 NON-COVERED ITEM OR SERVICE: HCPCS | Performed by: INTERNAL MEDICINE

## 2022-09-28 PROCEDURE — 82652 VIT D 1 25-DIHYDROXY: CPT

## 2022-09-28 PROCEDURE — 700102 HCHG RX REV CODE 250 W/ 637 OVERRIDE(OP): Performed by: STUDENT IN AN ORGANIZED HEALTH CARE EDUCATION/TRAINING PROGRAM

## 2022-09-28 PROCEDURE — 700102 HCHG RX REV CODE 250 W/ 637 OVERRIDE(OP): Performed by: INTERNAL MEDICINE

## 2022-09-28 PROCEDURE — 80053 COMPREHEN METABOLIC PANEL: CPT

## 2022-09-28 PROCEDURE — 83735 ASSAY OF MAGNESIUM: CPT

## 2022-09-28 PROCEDURE — 85025 COMPLETE CBC W/AUTO DIFF WBC: CPT

## 2022-09-28 PROCEDURE — 84100 ASSAY OF PHOSPHORUS: CPT

## 2022-09-28 PROCEDURE — 94640 AIRWAY INHALATION TREATMENT: CPT

## 2022-09-28 PROCEDURE — 700105 HCHG RX REV CODE 258: Performed by: INTERNAL MEDICINE

## 2022-09-28 PROCEDURE — 99233 SBSQ HOSP IP/OBS HIGH 50: CPT | Performed by: INTERNAL MEDICINE

## 2022-09-28 PROCEDURE — 700111 HCHG RX REV CODE 636 W/ 250 OVERRIDE (IP): Performed by: INTERNAL MEDICINE

## 2022-09-28 PROCEDURE — 770000 HCHG ROOM/CARE - INTERMEDIATE ICU *

## 2022-09-28 PROCEDURE — 700111 HCHG RX REV CODE 636 W/ 250 OVERRIDE (IP): Mod: TB | Performed by: INTERNAL MEDICINE

## 2022-09-28 RX ADMIN — DOXYCYCLINE 100 MG: 100 TABLET, FILM COATED ORAL at 05:10

## 2022-09-28 RX ADMIN — ACYCLOVIR 400 MG: 400 TABLET ORAL at 05:10

## 2022-09-28 RX ADMIN — DEXAMETHASONE 6 MG: 4 TABLET ORAL at 05:10

## 2022-09-28 RX ADMIN — ASPIRIN 81 MG: 81 TABLET, COATED ORAL at 05:10

## 2022-09-28 RX ADMIN — HYDRALAZINE HYDROCHLORIDE 10 MG: 10 TABLET, FILM COATED ORAL at 13:38

## 2022-09-28 RX ADMIN — HYDRALAZINE HYDROCHLORIDE 10 MG: 10 TABLET, FILM COATED ORAL at 21:06

## 2022-09-28 RX ADMIN — HEPARIN SODIUM 5000 UNITS: 5000 INJECTION INTRAVENOUS; SUBCUTANEOUS at 13:37

## 2022-09-28 RX ADMIN — HEPARIN SODIUM 5000 UNITS: 5000 INJECTION INTRAVENOUS; SUBCUTANEOUS at 21:07

## 2022-09-28 RX ADMIN — HYDRALAZINE HYDROCHLORIDE 10 MG: 10 TABLET, FILM COATED ORAL at 05:12

## 2022-09-28 RX ADMIN — REMDESIVIR 100 MG: 100 INJECTION, POWDER, LYOPHILIZED, FOR SOLUTION INTRAVENOUS at 17:24

## 2022-09-28 RX ADMIN — HEPARIN SODIUM 5000 UNITS: 5000 INJECTION INTRAVENOUS; SUBCUTANEOUS at 05:11

## 2022-09-28 ASSESSMENT — ENCOUNTER SYMPTOMS
FEVER: 0
COUGH: 1
MYALGIAS: 0
EYE PAIN: 0
PALPITATIONS: 0
CHILLS: 0
NAUSEA: 0
FOCAL WEAKNESS: 0
WEIGHT LOSS: 0
NECK PAIN: 0
HALLUCINATIONS: 0
DOUBLE VISION: 0
SPUTUM PRODUCTION: 1
BACK PAIN: 0
HEADACHES: 0
ORTHOPNEA: 0
TREMORS: 0
DIARRHEA: 0
ABDOMINAL PAIN: 0
TINGLING: 0
SPEECH CHANGE: 0
DIZZINESS: 0
SHORTNESS OF BREATH: 1
PHOTOPHOBIA: 0
BLURRED VISION: 0
VOMITING: 0
CONSTIPATION: 0
SENSORY CHANGE: 0

## 2022-09-28 ASSESSMENT — PAIN DESCRIPTION - PAIN TYPE
TYPE: ACUTE PAIN

## 2022-09-28 ASSESSMENT — LIFESTYLE VARIABLES: SUBSTANCE_ABUSE: 0

## 2022-09-28 NOTE — DISCHARGE PLANNING
Case Management Discharge Planning    Admission Date: 9/21/2022  GMLOS: 5.4  ALOS: 7    6-Clicks ADL Score: 24  6-Clicks Mobility Score: 24      Anticipated Discharge Dispo: Discharge Disposition:  (Discharge pending patient's medical course; uncertain 2 this time)  Discharge Address: [place] [address]  Discharge Contact Phone Number: [phone]    DME Needed: No    Action(s) Taken: CM met with patient's spouse; Jenniffer Fitch [phone] near patient's room d/t airborne isolation. CM introduced self & discussed the CM role r/t the CM screening. Patient is a  71 y/o male; alert & oriented. The patient last admission was 2 weeks ago & presented to ER as a direct admitted per [facility] with c/o weakness, anorexia & COVID 19. The patient resides with his spouse; Jenniffer Fitch in a 2 story home with 2 short flights of 7 stairs each performing all his ADL's/IDL's independently. The patient has no home health agency or DME supplies prior to admission. The patient assigned PCP is Dr. Farhana Reis & his last known PCP visit was 3 months ago. His preferred pharmacy f choice is [facility] in [place], [place] & he will have no issues obtaining medications at discharge. The patient's spouse; Jenniffer Fitch will provide transportation upon discharge. The patient's post acute discharge disposition is pending medical course & CM will continue to follow.    Escalations Completed: None    Medically Clear: No    Next Steps: care Coordination team pending medical course    Barriers to Discharge: Medical clearance

## 2022-09-28 NOTE — PROGRESS NOTES
Bedside report received from night shift RN. Patient is alert and oriented. No pain reported at this time. Plan of care discussed and no questions or concerns reported.

## 2022-09-28 NOTE — CARE PLAN
Problem: Humidified High Flow Nasal Cannula  Goal: Maintain adequate oxygenation dependent on patient condition  Description: Target End Date:  resolve prior to discharge or when underlying condition is resolved/stabilized    1.  Implement humidified high flow oxygen therapy  2.  Titrate high flow oxygen to maintain appropriate SpO2  Outcome: Progressing    HF 40 L 60%

## 2022-09-28 NOTE — CARE PLAN
The patient is Watcher - Medium risk of patient condition declining or worsening    Shift Goals  Clinical Goals: wean O2  Patient Goals: sleep, get well,  Family Goals: KARL    Progress made toward(s) clinical / shift goals:    Problem: Knowledge Deficit - Standard  Goal: Patient and family/care givers will demonstrate understanding of plan of care, disease process/condition, diagnostic tests and medications  Outcome: Progressing     Problem: Hemodynamics  Goal: Patient's hemodynamics, fluid balance and neurologic status will be stable or improve  Outcome: Progressing     Problem: Fluid Volume  Goal: Fluid volume balance will be maintained  Outcome: Progressing     Problem: Self Care  Goal: Patient will have the ability to perform ADLs independently or with assistance (bathe, groom, dress, toilet and feed)  Outcome: Progressing     Problem: Infection - Standard  Goal: Patient will remain free from infection  Outcome: Progressing     Problem: Fall Risk  Goal: Patient will remain free from falls  Outcome: Progressing     Problem: Psychosocial  Goal: Patient's level of anxiety will decrease  Outcome: Progressing  Goal: Patient's ability to verbalize feelings about condition will improve  Outcome: Progressing  Goal: Patient's ability to re-evaluate and adapt role responsibilities will improve  Outcome: Progressing  Goal: Patient and family will demonstrate ability to cope with life altering diagnosis and/or procedure  Outcome: Progressing  Goal: Spiritual and cultural needs incorporated into hospitalization  Outcome: Progressing     Problem: Respiratory  Goal: Patient will achieve/maintain optimum respiratory ventilation and gas exchange  Outcome: Progressing     Problem: Mechanical Ventilation  Goal: Safe management of artificial airway and ventilation  Outcome: Progressing  Goal: Successful weaning off mechanical ventilator, spontaneously maintains adequate gas exchange  Outcome: Progressing  Goal: Patient will be able  to express needs and understand communication  Outcome: Progressing     Problem: Risk for Aspiration  Goal: Patient's risk for aspiration will be absent or decrease  Outcome: Progressing     Problem: Urinary - Renal Perfusion  Goal: Ability to achieve and maintain adequate renal perfusion and functioning will improve  Outcome: Progressing     Problem: Venous Thromboembolism (VTE) Prevention  Goal: The patient will remain free from venous thromboembolism (VTE)  Outcome: Progressing     Problem: Nutrition  Goal: Patient's nutritional and fluid intake will be adequate or improve  Outcome: Progressing  Goal: Enteral nutrition will be maintained or improve  Outcome: Progressing  Goal: Enteral nutrition will be maintained or improve  Outcome: Progressing     Problem: Urinary Elimination  Goal: Establish and maintain regular urinary output  Outcome: Progressing     Problem: Bowel Elimination  Goal: Establish and maintain regular bowel function  Outcome: Progressing     Problem: Pain - Standard  Goal: Alleviation of pain or a reduction in pain to the patient’s comfort goal  Outcome: Progressing     Problem: Physical Regulation  Goal: Diagnostic test results will improve  Outcome: Progressing  Goal: Signs and symptoms of infection will decrease  Outcome: Progressing       Patient is not progressing towards the following goals:

## 2022-09-28 NOTE — PROGRESS NOTES
Hospital Medicine Daily Progress Note    Date of Service  9/28/2022    Chief Complaint  Cefernio Fitch is a 72 y.o. male admitted 9/21/2022 with elevated troponin     Hospital Course  This is a a 72 y.o. 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    Patient initially presented to OSH and was transferred here for high level of care.  Cardiology has been consulted and patient admitted for further work up and treatment.  Due to high oxygen requirement patient transferred to ICU on September 25, 2022.  Cardiology consulted and he underwent right heart catheterization on September 25, 2022.  Cardiology and critical care has been following him.  He transferred to [facility] level of care on September 27, 2022.    Interval Problem Update    I evaluated and examined him at the bedside.  He reported that he is feeling better still he has low appetite.  He reported mild cough.  Currently he is on 40 L of oxygen and maintaining oxygen saturation between 90 to 95%  Hemodynamically stable with mild hypotension.  Current white blood count is 4.5 platelet count 66  CMP showed slightly elevated anion gap of 17, BUN of 74, creatinine of 1.73 and slightly elevated liver enzymes.  Continue remdesivir last dose yesterday.  I discussed plan of care with NP  Discussed plan of care with bedside RN.      I discussed plan of care with bedside RN.    I have discussed this patient's plan of care and discharge plan at IDT rounds today with Case Management, Nursing, Nursing leadership, and other members of the IDT team.    Consultants/Specialty  cardiology  Critical care   Code Status  Full Code    Disposition  Patient is not medically cleared for discharge.   Anticipate discharge to to home with close outpatient follow-up.  I have placed the  appropriate orders for post-discharge needs.    Review of Systems  Review of Systems   Constitutional:  Negative for chills, fever and weight loss.   HENT:  Negative for hearing loss and tinnitus.    Eyes:  Negative for blurred vision, double vision, photophobia and pain.   Respiratory:  Positive for cough, sputum production and shortness of breath.    Cardiovascular:  Negative for chest pain, palpitations, orthopnea and leg swelling.   Gastrointestinal:  Negative for abdominal pain, constipation, diarrhea, nausea and vomiting.   Genitourinary:  Negative for dysuria, frequency and urgency.   Musculoskeletal:  Negative for back pain, joint pain, myalgias and neck pain.   Skin:  Negative for rash.   Neurological:  Negative for dizziness, tingling, tremors, sensory change, speech change, focal weakness and headaches.        Loss of taste sensation   Psychiatric/Behavioral:  Negative for hallucinations and substance abuse.    All other systems reviewed and are negative.     Physical Exam  Temp:  [35.8 °C (96.4 °F)-36.4 °C (97.5 °F)] 36 °C (96.8 °F)  Pulse:  [61-81] 63  Resp:  [14-32] 16  BP: ()/(46-71) 95/46  SpO2:  [90 %-98 %] 95 %    Physical Exam  Vitals reviewed.   Constitutional:       General: He is not in acute distress.     Comments: He was laying in bed without any acute distress.   HENT:      Head: Normocephalic and atraumatic. No contusion.      Right Ear: External ear normal.      Left Ear: External ear normal.      Nose: Nose normal.      Comments: High flow nasal cannula     Mouth/Throat:      Pharynx: No oropharyngeal exudate.   Eyes:      General:         Right eye: No discharge.         Left eye: No discharge.      Pupils: Pupils are equal, round, and reactive to light.   Cardiovascular:      Rate and Rhythm: Normal rate and regular rhythm.      Heart sounds: No murmur heard.    No friction rub. No gallop.   Pulmonary:      Effort: Pulmonary effort is normal.      Breath sounds: No wheezing or  rhonchi.   Abdominal:      General: Bowel sounds are normal. There is no distension.      Palpations: Abdomen is soft.      Tenderness: There is no abdominal tenderness. There is no rebound.   Musculoskeletal:         General: No swelling or tenderness. Normal range of motion.      Cervical back: No rigidity. No muscular tenderness.   Skin:     General: Skin is warm and dry.      Coloration: Skin is not jaundiced.   Neurological:      General: No focal deficit present.      Mental Status: He is alert and oriented to person, place, and time.      Cranial Nerves: No cranial nerve deficit.      Sensory: No sensory deficit.      Comments: Motor 5/5 in all 4 extremities   Psychiatric:         Mood and Affect: Mood normal.     I performed physical exam on September 28, 2022 it remains similar without any acute changes.    Fluids    Intake/Output Summary (Last 24 hours) at 9/28/2022 0748  Last data filed at 9/28/2022 0600  Gross per 24 hour   Intake 1360 ml   Output 1805 ml   Net -445 ml         Laboratory  Recent Labs     09/26/22  0416 09/27/22  0530 09/28/22  0515   WBC 3.1* 4.2* 4.5*   RBC 2.70* 3.01* 3.07*   HEMOGLOBIN 8.5* 9.4* 9.6*   HEMATOCRIT 24.4* 27.4* 27.8*   MCV 90.4 91.0 90.6   MCH 31.5 31.2 31.3   MCHC 34.8 34.3 34.5   RDW 48.1 48.0 46.8   PLATELETCT 70* 74* 66*   MPV 11.1 11.6 11.7       Recent Labs     09/26/22  0321 09/27/22  0530 09/28/22  0515   SODIUM 140 138 140   POTASSIUM 3.5* 3.6 4.3   CHLORIDE 104 103 103   CO2 21 23 20   GLUCOSE 104* 89 92   BUN 71* 79* 74*   CREATININE 2.03* 1.95* 1.73*   CALCIUM 8.0* 8.4* 8.1*                         Imaging  DX-CHEST-PORTABLE (1 VIEW)   Final Result      1.  Right-sided Timewell-Frannie catheter tip projects in the distal right main pulmonary artery region. No pneumothorax.   2.  Mild interval worsening in hazy bilateral pulmonary opacities.         DX-CHEST-PORTABLE (1 VIEW)   Final Result      1.  Right-sided Timewell-Frannie catheter tip projects in the distal right main  pulmonary artery region. No pneumothorax.   2.  Probable slight improvement in hazy bilateral pulmonary opacities.      DX-CHEST-PORTABLE (1 VIEW)   Final Result      1.  Right-sided PICC line unchanged in position.      2.  Increased interstitial markings throughout the lung fields especially in the perihilar regions consistent with pulmonary edema and/or pneumonitis.      CT-TAVR CHEST-ABD-PELVIS W/WO POST PROCESS   Final Result      1.  Limited by lack of intravenous contrast      2.  Aortic valve Agatston score 4565 and the annulus measures approximately 450 sq mm      3.  Coronary arteries originate over a centimeter above the annulus      4.  Severe atherosclerotic change with marked narrowing of the iliofemoral runoff, right worse than left      5.  Multifocal groundglass greater than bronchial thickening is compatible with known Covid pneumonia. There is also evidence of lower lung zone scarring, possible interstitial lung disease and small pleural fluid      6.  Colonic diverticulosis, emphysema, left heart enlargement      DX-CHEST-PORTABLE (1 VIEW)   Final Result      Unchanged BILATERAL pneumonia      EC-ECHOCARDIOGRAM COMPLETE W/O CONT   Final Result      DX-CHEST-LIMITED (1 VIEW)   Final Result      1.  Multifocal pneumonia. Pulmonary edema could have a similar appearance.      US-RUQ   Final Result      Unremarkable RIGHT upper quadrant ultrasound             Assessment/Plan  * Acute respiratory failure with hypoxia (HCC)  Assessment & Plan  Respiratory failure in setting of covid -19 vs ? HF   Continue to  monitor oxygen saturation closely  Incentive spirometry  Continue  dexamethasone  His oxygen requirement trending up now on high flow.  Plan is to titrate down high flow.  I have discussed case with infection disease and patient has been started on remdesivir   I discussed plan of care with patient and answered all his questions.    Heart failure with reduced ejection fraction (HCC)  Assessment &  Plan  Echo showed severely reduced ejection fraction   Cardiology is following him.      Advance care planning  Assessment & Plan  Discussed by prior treatment team.  had a prolonged discussion with the patient regarding goals of care, diagnoses, prognosis, and CODE STATUS. We discussed his   prognosis and comorbidities. I spent  16  minutes on advanced care planning in addition to the time spent managing the other medical problems.    He requested to remain full code      Elevated liver enzymes  Assessment & Plan  Hepatitis panel came back negative.  Liver enzyme has been trending down.  right upper quadrant ultrasound did not show any acute abnormalities.      Elevated brain natriuretic peptide (BNP) level  Assessment & Plan  Likely in setting of COVID-19 infection  Euvolemic on exam  Echo showed reduced ejection fraction.    CKD (chronic kidney disease)  Assessment & Plan  Renal function remained stable compared to his labs  Continue to monitor  Avoid nephrotoxin    Pancytopenia (HCC)  Assessment & Plan  In setting of immunotherapy  Continue to monitor    Hypokalemia  Assessment & Plan  Replace as needed.  Continue to monitor.      Low bicarbonate  Assessment & Plan  In setting of CKD  Now resolved    COVID  Assessment & Plan    Activate sepsis protocol  COVID Isolation  NC 2 LPM keep O2 Sat >92%currently on nasal cannula  Continue on Decadron  Monitor oxygen saturation closely      NSTEMI (non-ST elevated myocardial infarction) (HCC)- (present on admission)  Assessment & Plan    Echo showing ef of 20% also severer Aortic stenosis  Cardiology following  No symptoms of acute chest pain.      Essential hypertension, benign- (present on admission)  Assessment & Plan  Mild hypotension.  Continue to monitor.    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    last immunotherapy infusion to treat his cancer in [place] on 09/09/2022  Follow-up with outpatient    I reviewed above assessment plan no acute changes.  Continue to monitor him in IMCU due to acute respiratory failure with hypoxia requiring high flow nasal cannula.    Discussed plan of care during multidisciplinary rounds regarding his current medical condition and plan of care.        VTE prophylaxis: heparin ppx    I have performed a physical exam and reviewed and updated ROS and Plan today (9/28/2022). In review of yesterday's note (9/27/2022), there are no changes except as documented above.

## 2022-09-29 LAB
ALBUMIN SERPL BCP-MCNC: 2.5 G/DL (ref 3.2–4.9)
ALBUMIN/GLOB SERPL: 1 G/DL
ALP SERPL-CCNC: 850 U/L (ref 30–99)
ALT SERPL-CCNC: 219 U/L (ref 2–50)
ANION GAP SERPL CALC-SCNC: 17 MMOL/L (ref 7–16)
AST SERPL-CCNC: 346 U/L (ref 12–45)
BASOPHILS # BLD AUTO: 0 % (ref 0–1.8)
BASOPHILS # BLD: 0 K/UL (ref 0–0.12)
BILIRUB SERPL-MCNC: 1.3 MG/DL (ref 0.1–1.5)
BUN SERPL-MCNC: 70 MG/DL (ref 8–22)
CALCIUM SERPL-MCNC: 8.5 MG/DL (ref 8.5–10.5)
CHLORIDE SERPL-SCNC: 102 MMOL/L (ref 96–112)
CO2 SERPL-SCNC: 18 MMOL/L (ref 20–33)
CREAT SERPL-MCNC: 1.67 MG/DL (ref 0.5–1.4)
EOSINOPHIL # BLD AUTO: 0 K/UL (ref 0–0.51)
EOSINOPHIL NFR BLD: 0 % (ref 0–6.9)
ERYTHROCYTE [DISTWIDTH] IN BLOOD BY AUTOMATED COUNT: 46.9 FL (ref 35.9–50)
GFR SERPLBLD CREATININE-BSD FMLA CKD-EPI: 43 ML/MIN/1.73 M 2
GLOBULIN SER CALC-MCNC: 2.4 G/DL (ref 1.9–3.5)
GLUCOSE SERPL-MCNC: 97 MG/DL (ref 65–99)
HCT VFR BLD AUTO: 29.9 % (ref 42–52)
HGB BLD-MCNC: 10.2 G/DL (ref 14–18)
IMM GRANULOCYTES # BLD AUTO: 0.08 K/UL (ref 0–0.11)
IMM GRANULOCYTES NFR BLD AUTO: 1.3 % (ref 0–0.9)
LYMPHOCYTES # BLD AUTO: 0.24 K/UL (ref 1–4.8)
LYMPHOCYTES NFR BLD: 3.9 % (ref 22–41)
MAGNESIUM SERPL-MCNC: 2.2 MG/DL (ref 1.5–2.5)
MCH RBC QN AUTO: 31.1 PG (ref 27–33)
MCHC RBC AUTO-ENTMCNC: 34.1 G/DL (ref 33.7–35.3)
MCV RBC AUTO: 91.2 FL (ref 81.4–97.8)
MONOCYTES # BLD AUTO: 0.31 K/UL (ref 0–0.85)
MONOCYTES NFR BLD AUTO: 5 % (ref 0–13.4)
NEUTROPHILS # BLD AUTO: 5.55 K/UL (ref 1.82–7.42)
NEUTROPHILS NFR BLD: 89.8 % (ref 44–72)
NRBC # BLD AUTO: 0 K/UL
NRBC BLD-RTO: 0 /100 WBC
PHOSPHATE SERPL-MCNC: 4.5 MG/DL (ref 2.5–4.5)
PLATELET # BLD AUTO: 93 K/UL (ref 164–446)
PMV BLD AUTO: 11.6 FL (ref 9–12.9)
POTASSIUM SERPL-SCNC: 4.1 MMOL/L (ref 3.6–5.5)
PROT SERPL-MCNC: 4.9 G/DL (ref 6–8.2)
RBC # BLD AUTO: 3.28 M/UL (ref 4.7–6.1)
SODIUM SERPL-SCNC: 137 MMOL/L (ref 135–145)
WBC # BLD AUTO: 6.2 K/UL (ref 4.8–10.8)

## 2022-09-29 PROCEDURE — 700102 HCHG RX REV CODE 250 W/ 637 OVERRIDE(OP): Performed by: INTERNAL MEDICINE

## 2022-09-29 PROCEDURE — A9270 NON-COVERED ITEM OR SERVICE: HCPCS | Performed by: STUDENT IN AN ORGANIZED HEALTH CARE EDUCATION/TRAINING PROGRAM

## 2022-09-29 PROCEDURE — 700102 HCHG RX REV CODE 250 W/ 637 OVERRIDE(OP): Performed by: STUDENT IN AN ORGANIZED HEALTH CARE EDUCATION/TRAINING PROGRAM

## 2022-09-29 PROCEDURE — A9270 NON-COVERED ITEM OR SERVICE: HCPCS | Performed by: INTERNAL MEDICINE

## 2022-09-29 PROCEDURE — 99233 SBSQ HOSP IP/OBS HIGH 50: CPT | Performed by: INTERNAL MEDICINE

## 2022-09-29 PROCEDURE — 85025 COMPLETE CBC W/AUTO DIFF WBC: CPT

## 2022-09-29 PROCEDURE — 80053 COMPREHEN METABOLIC PANEL: CPT

## 2022-09-29 PROCEDURE — 700111 HCHG RX REV CODE 636 W/ 250 OVERRIDE (IP): Performed by: INTERNAL MEDICINE

## 2022-09-29 PROCEDURE — 770000 HCHG ROOM/CARE - INTERMEDIATE ICU *

## 2022-09-29 PROCEDURE — 83735 ASSAY OF MAGNESIUM: CPT

## 2022-09-29 PROCEDURE — 84100 ASSAY OF PHOSPHORUS: CPT

## 2022-09-29 PROCEDURE — 94640 AIRWAY INHALATION TREATMENT: CPT

## 2022-09-29 RX ORDER — HYDRALAZINE HYDROCHLORIDE 10 MG/1
10 TABLET, FILM COATED ORAL EVERY 8 HOURS
Status: DISCONTINUED | OUTPATIENT
Start: 2022-09-29 | End: 2022-10-12 | Stop reason: HOSPADM

## 2022-09-29 RX ADMIN — DEXAMETHASONE 6 MG: 4 TABLET ORAL at 06:11

## 2022-09-29 RX ADMIN — HEPARIN SODIUM 5000 UNITS: 5000 INJECTION INTRAVENOUS; SUBCUTANEOUS at 13:19

## 2022-09-29 RX ADMIN — ACYCLOVIR 400 MG: 400 TABLET ORAL at 06:12

## 2022-09-29 RX ADMIN — HYDRALAZINE HYDROCHLORIDE 10 MG: 10 TABLET, FILM COATED ORAL at 06:12

## 2022-09-29 RX ADMIN — ASPIRIN 81 MG: 81 TABLET, COATED ORAL at 06:12

## 2022-09-29 RX ADMIN — HEPARIN SODIUM 5000 UNITS: 5000 INJECTION INTRAVENOUS; SUBCUTANEOUS at 21:21

## 2022-09-29 RX ADMIN — HEPARIN SODIUM 5000 UNITS: 5000 INJECTION INTRAVENOUS; SUBCUTANEOUS at 06:12

## 2022-09-29 ASSESSMENT — ENCOUNTER SYMPTOMS
HALLUCINATIONS: 0
ABDOMINAL PAIN: 0
TREMORS: 0
SPEECH CHANGE: 0
BLURRED VISION: 0
DIZZINESS: 0
DIARRHEA: 0
SENSORY CHANGE: 0
FEVER: 0
COUGH: 1
NECK PAIN: 0
SPUTUM PRODUCTION: 1
BACK PAIN: 0
SHORTNESS OF BREATH: 1
ORTHOPNEA: 0
VOMITING: 0
WEIGHT LOSS: 0
NAUSEA: 0
CONSTIPATION: 0
PHOTOPHOBIA: 0
TINGLING: 0
MYALGIAS: 0
DOUBLE VISION: 0
FOCAL WEAKNESS: 0
EYE PAIN: 0
CHILLS: 0
PALPITATIONS: 0
HEADACHES: 0

## 2022-09-29 ASSESSMENT — PATIENT HEALTH QUESTIONNAIRE - PHQ9
SUM OF ALL RESPONSES TO PHQ9 QUESTIONS 1 AND 2: 0
1. LITTLE INTEREST OR PLEASURE IN DOING THINGS: NOT AT ALL
2. FEELING DOWN, DEPRESSED, IRRITABLE, OR HOPELESS: NOT AT ALL

## 2022-09-29 ASSESSMENT — PAIN DESCRIPTION - PAIN TYPE: TYPE: ACUTE PAIN

## 2022-09-29 ASSESSMENT — FIBROSIS 4 INDEX: FIB4 SCORE: 18.35

## 2022-09-29 ASSESSMENT — LIFESTYLE VARIABLES: SUBSTANCE_ABUSE: 0

## 2022-09-29 NOTE — DIETARY
Nutrition Services: Update   Day 8 of admit.  Ceferino Fitch is a 72 y.o. male with admitting DX of NSTEMI (non-ST elevated myocardial infarction) (HCC) [I21.4]    Pt is currently on cardiac diet. PO intake of his meals are poor at < 25%. Per nurse, pt is drank all of his Boost Plus this morning w/ breakfast. RD spoke w/ pt's wife. She is concerned about pt's PO intake . Preferences obtained. Pt does not like the strawberry Boost. Muffin added to breakfast and vanilla pudding added to lunch. Boost Plus w/ dinner changed to Boost VHC for additional kcals and protein. Due to pt's poor PO intake, MD agreed to liberalization of diet from cardiac to regular.     Prior to hospitalization, pt's wife reports that he was eating very little for ~ 2 weeks. He normally likes to eat. When asked how much < than normal he was eating, she said he was only eating 10% (<50%) of his normal intake.     Wt 9/29/22: 58.3 kg via bed scale - This weight is questionable. Based on this wt, wt loss noted of 6.5 kg (10%) x 2 days. Pt's wife does not think pt weighs only 128 lb. She thinks weight from 9/27/22 of 64.8 kg (143 lb) is more accurate. She thinks his UBW was ~150 lb. Based on pt's reported UBW, pt has lost 7 kg (4.7%) x 2 weeks when PO intake was noted to diminish. Wt loss is significant.     Malnutrition Risk: pt meets ASPEN criteria for severe malnutrition in the context of acute illness r/t diminished appetite due to altered taste caused by COVID-19 AEB report of PO < 50% of normal x 2 weeks and wt loss of 4.7% x 2 weeks.     Problem: Nutritional:  Goal: Achieve adequate nutritional intake  Description: Patient will consume >50% of meals and supplements  Outcome: progressing slower than expected.      Recommendations/Plan:  Modify supplement order to Boost Plus BID and Boost VHC 1 x daily.   Liberalize diet to regular.    Encourage intake of meals  Document intake of all meals as % taken in ADL's to provide interdisciplinary  communication across all shifts.   Monitor weight.  Nutrition rep will continue to see patient for ongoing meal and snack preferences.     RD following

## 2022-09-29 NOTE — PROGRESS NOTES
Interval:  No acute events. Without cardiac complaints today.    Medications / Drug list prior to admission:  No current facility-administered medications on file prior to encounter.     Current Outpatient Medications on File Prior to Encounter   Medication Sig Dispense Refill    acetaminophen (TYLENOL) 325 MG Tab Take 325 mg by mouth one time as needed.      Cyanocobalamin 2000 MCG Tab Take 1 Tablet by mouth every day.      acyclovir (ZOVIRAX) 400 MG tablet Take 400 mg by mouth every day.      allopurinol (ZYLOPRIM) 100 MG Tab Take 100 mg by mouth every day.      aspirin 81 MG EC tablet Take 81 mg by mouth every day.      vitamin D (VITAMIND D3) 1000 UNIT Tab Take 2,000 Units by mouth every day.      rosuvastatin (CRESTOR) 20 MG Tab Take 20 mg by mouth every day.         Current list of administered Medications:    Current Facility-Administered Medications:     hydrALAZINE (APRESOLINE) tablet 10 mg, 10 mg, Oral, Q8HRS, Marcos Ball M.D.    acyclovir (Zovirax) tablet 400 mg, 400 mg, Oral, DAILY, Sally Mondragon M.D., 400 mg at 09/29/22 0612    senna-docusate (PERICOLACE or SENOKOT S) 8.6-50 MG per tablet 2 Tablet, 2 Tablet, Oral, BID **AND** polyethylene glycol/lytes (MIRALAX) PACKET 1 Packet, 1 Packet, Oral, QDAY PRN **AND** magnesium hydroxide (MILK OF MAGNESIA) suspension 30 mL, 30 mL, Oral, QDAY PRN **AND** bisacodyl (DULCOLAX) suppository 10 mg, 10 mg, Rectal, QDAY PRN, Sally Mondragon M.D.    aspirin EC (ECOTRIN) tablet 81 mg, 81 mg, Oral, DAILY, Jennifer Villarreal M.D., 81 mg at 09/29/22 0612    heparin injection 5,000 Units, 5,000 Units, Subcutaneous, Q8HRS, Jennifer Villarreal M.D., 5,000 Units at 09/29/22 0612    dexamethasone (DECADRON) tablet 6 mg, 6 mg, Oral, DAILY, Judah Chen M.D., 6 mg at 09/29/22 0611    Past Medical History:   Diagnosis Date    Cancer (HCC)     Follicular lymphoma (HCC)     Hypertension        No past surgical history on file.    No family history on file.  Patient family  history was personally reviewed, no pertinent family history to current presentation    Social History     Socioeconomic History    Marital status:      Spouse name: Not on file    Number of children: Not on file    Years of education: Not on file    Highest education level: Not on file   Occupational History    Not on file   Tobacco Use    Smoking status: Former     Passive exposure: Past    Smokeless tobacco: Never   Vaping Use    Vaping Use: Not on file   Substance and Sexual Activity    Alcohol use: Not Currently     Alcohol/week: 3.0 oz     Types: 5 Cans of beer per week    Drug use: Never    Sexual activity: Not on file   Other Topics Concern    Not on file   Social History Narrative    Not on file     Social Determinants of Health     Financial Resource Strain: Not on file   Food Insecurity: Not on file   Transportation Needs: Not on file   Physical Activity: Not on file   Stress: Not on file   Social Connections: Not on file   Intimate Partner Violence: Not on file   Housing Stability: Not on file       ALLERGIES:  Allergies   Allergen Reactions    Penicillins Rash and Unspecified     Hoarseness         Review of systems:  A complete review of symptoms was reviewed with patient. This is reviewed in H&P and PMH. ALL OTHERS reviewed and negative    Physical exam:  Patient Vitals for the past 24 hrs:   BP Temp Temp src Pulse Resp SpO2 Weight   09/29/22 1200 (!) 95/47 -- -- 79 (!) 24 96 % --   09/29/22 1100 (!) 93/53 -- -- 83 16 96 % --   09/29/22 1000 108/55 -- -- 76 20 92 % --   09/29/22 0900 107/53 -- -- 74 17 92 % --   09/29/22 0800 (!) 94/50 (!) 35.7 °C (96.3 °F) Temporal 78 17 90 % --   09/29/22 0739 -- -- -- 68 16 94 % --   09/29/22 0700 117/56 -- -- 72 18 93 % --   09/29/22 0500 125/59 -- -- 68 17 96 % --   09/29/22 0400 (!) 99/55 (!) 35.7 °C (96.2 °F) Temporal 74 17 89 % 58.3 kg (128 lb 8.5 oz)   09/29/22 0300 107/52 -- -- 68 19 89 % --   09/29/22 0215 -- -- -- 65 17 95 % --   09/29/22 3110  106/52 -- -- 68 18 94 % --   09/29/22 0100 118/57 -- -- 66 15 95 % --   09/29/22 0000 120/59 (!) 35.6 °C (96.1 °F) Tympanic 66 18 92 % --   09/28/22 2300 105/52 -- -- 62 (!) 28 98 % --   09/28/22 2100 124/57 (!) 35.6 °C (96 °F) Temporal 65 17 90 % --   09/28/22 2000 130/61 -- -- 70 (!) 21 94 % --   09/28/22 1900 117/83 -- -- 68 (!) 21 94 % --   09/28/22 1800 135/63 -- -- 64 19 96 % --   09/28/22 1700 -- -- -- 66 16 98 % --   09/28/22 1600 116/72 -- -- 70 19 98 % --   09/28/22 1500 138/60 -- -- 75 (!) 24 91 % --   09/28/22 1422 -- -- -- 66 18 95 % --   09/28/22 1400 -- -- -- 64 18 99 % --     General: No acute distress.   EYES: no jaundice  HEENT: OP clear   Neck: No bruits No JVD.   CVS:  RRR. S1 + S2. No M/R/G. No edema.  Resp: CTAB. No wheezing or crackles/rhonchi.  Abdomen: Soft, NT, ND,  Skin: Grossly nothing acute no obvious rashes  Neurological: Alert, Moves all extremities, no cranial nerve defects on limited exam  Extremities: Pulse 2+ in b/l LE. No cyanosis.     Data:  Laboratory studies personally reviewed by me:  Recent Results (from the past 24 hour(s))   CBC WITH DIFFERENTIAL    Collection Time: 09/29/22  3:21 AM   Result Value Ref Range    WBC 6.2 4.8 - 10.8 K/uL    RBC 3.28 (L) 4.70 - 6.10 M/uL    Hemoglobin 10.2 (L) 14.0 - 18.0 g/dL    Hematocrit 29.9 (L) 42.0 - 52.0 %    MCV 91.2 81.4 - 97.8 fL    MCH 31.1 27.0 - 33.0 pg    MCHC 34.1 33.7 - 35.3 g/dL    RDW 46.9 35.9 - 50.0 fL    Platelet Count 93 (L) 164 - 446 K/uL    MPV 11.6 9.0 - 12.9 fL    Neutrophils-Polys 89.80 (H) 44.00 - 72.00 %    Lymphocytes 3.90 (L) 22.00 - 41.00 %    Monocytes 5.00 0.00 - 13.40 %    Eosinophils 0.00 0.00 - 6.90 %    Basophils 0.00 0.00 - 1.80 %    Immature Granulocytes 1.30 (H) 0.00 - 0.90 %    Nucleated RBC 0.00 /100 WBC    Neutrophils (Absolute) 5.55 1.82 - 7.42 K/uL    Lymphs (Absolute) 0.24 (L) 1.00 - 4.80 K/uL    Monos (Absolute) 0.31 0.00 - 0.85 K/uL    Eos (Absolute) 0.00 0.00 - 0.51 K/uL    Baso (Absolute) 0.00  0.00 - 0.12 K/uL    Immature Granulocytes (abs) 0.08 0.00 - 0.11 K/uL    NRBC (Absolute) 0.00 K/uL   Comp Metabolic Panel    Collection Time: 09/29/22  3:21 AM   Result Value Ref Range    Sodium 137 135 - 145 mmol/L    Potassium 4.1 3.6 - 5.5 mmol/L    Chloride 102 96 - 112 mmol/L    Co2 18 (L) 20 - 33 mmol/L    Anion Gap 17.0 (H) 7.0 - 16.0    Glucose 97 65 - 99 mg/dL    Bun 70 (H) 8 - 22 mg/dL    Creatinine 1.67 (H) 0.50 - 1.40 mg/dL    Calcium 8.5 8.5 - 10.5 mg/dL    AST(SGOT) 346 (H) 12 - 45 U/L    ALT(SGPT) 219 (H) 2 - 50 U/L    Alkaline Phosphatase 850 (H) 30 - 99 U/L    Total Bilirubin 1.3 0.1 - 1.5 mg/dL    Albumin 2.5 (L) 3.2 - 4.9 g/dL    Total Protein 4.9 (L) 6.0 - 8.2 g/dL    Globulin 2.4 1.9 - 3.5 g/dL    A-G Ratio 1.0 g/dL   MAGNESIUM    Collection Time: 09/29/22  3:21 AM   Result Value Ref Range    Magnesium 2.2 1.5 - 2.5 mg/dL   PHOSPHORUS    Collection Time: 09/29/22  3:21 AM   Result Value Ref Range    Phosphorus 4.5 2.5 - 4.5 mg/dL   ESTIMATED GFR    Collection Time: 09/29/22  3:21 AM   Result Value Ref Range    GFR (CKD-EPI) 43 (A) >60 mL/min/1.73 m 2         All pertinent features of laboratory and imaging reviewed including primary images where applicable    TTE 9/22/22  CONCLUSIONS  No prior study is available for comparison.   Reduced left ventricular systolic function. The left ventricular   ejection fraction is visually estimated to be 20%. Hypokinesis of the   inferior and inferolateral segments.  Cannot rule out low flow low gradient severe aortic stenosis.  Mild eccentric aortic insufficiency.    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      Known white-coat syndrome, BP at home 120's/80's    NSTEMI (non-ST elevated myocardial infarction)  (HCC) POA: Yes    COVID POA: Unknown    Low bicarbonate POA: Unknown    Hypokalemia POA: Unknown    Pancytopenia (HCC) POA: Unknown    CKD (chronic kidney disease) POA: Unknown    Elevated brain natriuretic peptide (BNP) level POA: Unknown    Elevated liver enzymes POA: Unknown    Advance care planning POA: Unknown    Heart failure with reduced ejection fraction (HCC) POA: Unknown  Resolved Problems:    * No resolved hospital problems. *      Assessment / Plan:  75 year old man with covid-19 pneumonia causing hypoxemic respiratory failure. Consult for HFrEF 20% and low flow low gradient aortic stenosis.      -when renal function baseline will titrate on HFOMT - BB, ACE/ARB, MRA, SGLT2i.   -for now attempt hydralazine. Possible addition isordil later. These can be converted to ACE/ARB once renal function improves.  -RHC with low filling pressures: CVP 1 and wedge 4. No need for loop diuretic. Left heart not cause of pulmonary edema.    -consider dobutamine stress echo when improves from covid-19. Still looking for evidence of ?true? severe aortic stenosis.   -will sign off for now. Please reconsult when can tolerate dobutamine echo and to further titrate HFOMT    I personally discussed his case with Dr Valdez    It is my pleasure to participate in the care of Mr. Fitch.  Please do not hesitate to contact me with questions or concerns.    Marcos Ball MD  Cardiologist [facility]    A total of 41 minutes of time was spent on day of encounter reviewing medical record, performing history and examination, counseling, ordering medication/test/consults and documentation.

## 2022-09-29 NOTE — DOCUMENTATION QUERY
[facility]                                                                       Query Response Note      PATIENT:               SUSSY MORA  ACCT #:                  [id number]  MRN:                     [id number]  :                      1950  ADMIT DATE:       2022 4:51 PM  DISCH DATE:          RESPONDING  PROVIDER #:        [id number]           QUERY TEXT:    Severe malnutrition is documented in the Registered Dietician evaluation. Please specify if you:    NOTE:  If an appropriate response is not listed below, please respond with a new note.      The patient's Clinical Indicators include:  DTY:  BMI 21.83  Malnutrition Risk: pt meets ASPEN criteria for severe malnutrition in the context of acute illness r/t diminished appetite due to altered taste caused by COVID-19 AEB report of PO < 50% of normal x 2 weeks and wt loss of 4.7% x 2 weeks.    Recommendations/Plan:  - Will add Boost Plus with meals to help optimize PO intake.  - Encourage PO and supplement intake and document as % taken in ADL's   - Monitor weight.  - Nutrition rep will continue to see patient for ongoing meal and snack preferences.       Thank you,  TNaty Fitzgerald RN, BSN, CCDS  Connect via Voalte  Options provided:   -- Agree with dietician  assessment of severe malnutrition and the dietary recommendations.   -- Disagree with dietician assessment of severe malnutrition.   -- Other   -- Unable to determine      Query created by: Priya Fitzgerald on 2022 11:55 AM    RESPONSE TEXT:    Agree with dietician assessment of severe malnutrition and the dietary recommendations.          Electronically signed by:  BEN GIBSON MD 2022 12:08 PM

## 2022-09-29 NOTE — CARE PLAN
The patient is Stable - Low risk of patient condition declining or worsening    Shift Goals  Clinical Goals: wean O2  Patient Goals: Sleep, increase appetite  Family Goals: KARL    Progress made toward(s) clinical / shift goals:        Problem: Knowledge Deficit - Standard  Goal: Patient and family/care givers will demonstrate understanding of plan of care, disease process/condition, diagnostic tests and medications  Outcome: Progressing     Problem: Self Care  Goal: Patient will have the ability to perform ADLs independently or with assistance (bathe, groom, dress, toilet and feed)  Outcome: Progressing     Problem: Infection - Standard  Goal: Patient will remain free from infection  Outcome: Progressing     Problem: Fall Risk  Goal: Patient will remain free from falls  Outcome: Progressing     Problem: Respiratory  Goal: Patient will achieve/maintain optimum respiratory ventilation and gas exchange  Outcome: Progressing     Patient had no complaints of pain. Discussed plan of care with the patient and family at bedside.

## 2022-09-29 NOTE — CARE PLAN
The patient is Watcher - Medium risk of patient condition declining or worsening    Shift Goals  Clinical Goals: wean O2, safet  Problem: Knowledge Deficit - Standard  Goal: Patient and family/care givers will demonstrate understanding of plan of care, disease process/condition, diagnostic tests and medications  Outcome: Progressing     Problem: Hemodynamics  Goal: Patient's hemodynamics, fluid balance and neurologic status will be stable or improve  Outcome: Progressing     Problem: Fluid Volume  Goal: Fluid volume balance will be maintained  Outcome: Progressing     Problem: Self Care  Goal: Patient will have the ability to perform ADLs independently or with assistance (bathe, groom, dress, toilet and feed)  Outcome: Progressing     Problem: Infection - Standard  Goal: Patient will remain free from infection  Outcome: Progressing     Problem: Fall Risk  Goal: Patient will remain free from falls  Outcome: Progressing     Problem: Psychosocial  Goal: Patient's level of anxiety will decrease  Outcome: Progressing  Goal: Patient's ability to verbalize feelings about condition will improve  Outcome: Progressing  Goal: Patient's ability to re-evaluate and adapt role responsibilities will improve  Outcome: Progressing  Goal: Patient and family will demonstrate ability to cope with life altering diagnosis and/or procedure  Outcome: Progressing  Goal: Spiritual and cultural needs incorporated into hospitalization  Outcome: Progressing     Problem: Respiratory  Goal: Patient will achieve/maintain optimum respiratory ventilation and gas exchange  Outcome: Progressing     Problem: Risk for Aspiration  Goal: Patient's risk for aspiration will be absent or decrease  Outcome: Progressing     Problem: Urinary - Renal Perfusion  Goal: Ability to achieve and maintain adequate renal perfusion and functioning will improve  Outcome: Progressing     Problem: Venous Thromboembolism (VTE) Prevention  Goal: The patient will remain free  from venous thromboembolism (VTE)  Outcome: Progressing     Problem: Nutrition  Goal: Patient's nutritional and fluid intake will be adequate or improve  Outcome: Progressing  Goal: Enteral nutrition will be maintained or improve  Outcome: Progressing  Goal: Enteral nutrition will be maintained or improve  Outcome: Progressing     Problem: Urinary Elimination  Goal: Establish and maintain regular urinary output  Outcome: Progressing     Problem: Bowel Elimination  Goal: Establish and maintain regular bowel function  Outcome: Progressing     Problem: Pain - Standard  Goal: Alleviation of pain or a reduction in pain to the patient’s comfort goal  Outcome: Progressing     Problem: Physical Regulation  Goal: Diagnostic test results will improve  Outcome: Progressing  Goal: Signs and symptoms of infection will decrease  Outcome: Progressing    Patient Goals: get well. go home  Family Goals: aldo    Progress made toward(s) clinical / shift goals:      Patient is not progressing towards the following goals:

## 2022-09-29 NOTE — DISCHARGE PLANNING
CM noted patient currently remains on HF 30 liters w/ FIO2 40% with oxygen stats 90%. IV Remdesivir Completed & note Liver enzymes elevated. Patient liver enzymes will be monitor & continuance of wean O2 requirements.

## 2022-09-29 NOTE — PROGRESS NOTES
Hospital Medicine Daily Progress Note    Date of Service  9/29/2022    Chief Complaint  Ceferino Fitch is a 72 y.o. male admitted 9/21/2022 with elevated troponin     Hospital Course  This is a a 72 y.o. 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    Patient initially presented to OSH and was transferred here for high level of care.  Cardiology has been consulted and patient admitted for further work up and treatment.  Due to high oxygen requirement patient transferred to ICU on September 25, 2022.  Cardiology consulted and he underwent right heart catheterization on September 25, 2022.  Cardiology and critical care has been following him.  He transferred to [facility] level of care on September 27, 2022.    Interval Problem Update    I evaluated and examined him at the bedside.  He reported his cough has been improving.  I change his diet to regular diet from cardiac diet due to very poor appetite.  I discussed it with him.  Oxygen requirement is trending down.  Currently is on 30 L of oxygen  Current white blood cell count is within normal limits 6.2.  Hemoglobin remained stable 10.2.  This morning he found to have slightly low bicarb current bicarb is 18.  Anion gap is 17.    BUN and creatinine has been improving current BUN is 70 and creatinine 1.67.  He found to have significantly elevated liver enzymes ,  and alkaline phosphatase 850. His liver enzymes elevated most likely secondary to remdesivir used.  Continue to monitor his liver enzymes.  No significant right upper quadrant pain.      I discussed plan of care with bedside RN.    I have discussed this patient's plan of care and discharge plan at IDT rounds today with Case Management, Nursing, Nursing leadership, and other members of the IDT  team.    Consultants/Specialty  cardiology  Critical care   Code Status  Full Code    Disposition  Patient is not medically cleared for discharge.   Anticipate discharge to to home with close outpatient follow-up.  I have placed the appropriate orders for post-discharge needs.    Review of Systems  Review of Systems   Constitutional:  Negative for chills, fever and weight loss.   HENT:  Negative for hearing loss and tinnitus.    Eyes:  Negative for blurred vision, double vision, photophobia and pain.   Respiratory:  Positive for cough (Improving), sputum production and shortness of breath (Improving).    Cardiovascular:  Negative for chest pain, palpitations, orthopnea and leg swelling.   Gastrointestinal:  Negative for abdominal pain, constipation, diarrhea, nausea and vomiting.   Genitourinary:  Negative for dysuria, frequency and urgency.   Musculoskeletal:  Negative for back pain, joint pain, myalgias and neck pain.   Skin:  Negative for rash.   Neurological:  Negative for dizziness, tingling, tremors, sensory change, speech change, focal weakness and headaches.        Loss of taste sensation   Psychiatric/Behavioral:  Negative for hallucinations and substance abuse.    All other systems reviewed and are negative.     Physical Exam  Temp:  [35.6 °C (96 °F)-35.7 °C (96.2 °F)] 35.7 °C (96.2 °F)  Pulse:  [62-77] 68  Resp:  [15-29] 17  BP: ()/(52-83) 125/59  SpO2:  [87 %-99 %] 96 %    Physical Exam  Vitals reviewed.   Constitutional:       General: He is not in acute distress.     Comments: He was laying in bed without any acute distress.   HENT:      Head: Normocephalic and atraumatic. No contusion.      Right Ear: External ear normal.      Left Ear: External ear normal.      Nose: Nose normal.      Comments: High flow nasal cannula     Mouth/Throat:      Pharynx: No oropharyngeal exudate.   Eyes:      General:         Right eye: No discharge.         Left eye: No discharge.      Pupils: Pupils are equal,  round, and reactive to light.   Cardiovascular:      Rate and Rhythm: Normal rate and regular rhythm.      Heart sounds: No murmur heard.    No friction rub. No gallop.   Pulmonary:      Effort: Pulmonary effort is normal.      Breath sounds: No wheezing or rhonchi.   Abdominal:      General: Bowel sounds are normal. There is no distension.      Palpations: Abdomen is soft.      Tenderness: There is no abdominal tenderness. There is no rebound.   Musculoskeletal:         General: No swelling or tenderness. Normal range of motion.      Cervical back: No rigidity. No muscular tenderness.   Skin:     General: Skin is warm and dry.      Coloration: Skin is not jaundiced.   Neurological:      General: No focal deficit present.      Mental Status: He is alert and oriented to person, place, and time.      Cranial Nerves: No cranial nerve deficit.      Sensory: No sensory deficit.      Comments: Motor 5/5 in all 4 extremities   Psychiatric:         Mood and Affect: Mood normal.     I performed physical exam on September 29, 2022 it remains similar without any acute changes.    Fluids    Intake/Output Summary (Last 24 hours) at 9/29/2022 0740  Last data filed at 9/28/2022 2000  Gross per 24 hour   Intake --   Output 225 ml   Net -225 ml         Laboratory  Recent Labs     09/27/22  0530 09/28/22  0515 09/29/22  0321   WBC 4.2* 4.5* 6.2   RBC 3.01* 3.07* 3.28*   HEMOGLOBIN 9.4* 9.6* 10.2*   HEMATOCRIT 27.4* 27.8* 29.9*   MCV 91.0 90.6 91.2   MCH 31.2 31.3 31.1   MCHC 34.3 34.5 34.1   RDW 48.0 46.8 46.9   PLATELETCT 74* 66* 93*   MPV 11.6 11.7 11.6       Recent Labs     09/27/22  0530 09/28/22  0515 09/29/22  0321   SODIUM 138 140 137   POTASSIUM 3.6 4.3 4.1   CHLORIDE 103 103 102   CO2 23 20 18*   GLUCOSE 89 92 97   BUN 79* 74* 70*   CREATININE 1.95* 1.73* 1.67*   CALCIUM 8.4* 8.1* 8.5                         Imaging  DX-CHEST-PORTABLE (1 VIEW)   Final Result      1.  Right-sided [place]-Frannie catheter tip projects in the distal  right main pulmonary artery region. No pneumothorax.   2.  Mild interval worsening in hazy bilateral pulmonary opacities.         DX-CHEST-PORTABLE (1 VIEW)   Final Result      1.  Right-sided [place]-Frannie catheter tip projects in the distal right main pulmonary artery region. No pneumothorax.   2.  Probable slight improvement in hazy bilateral pulmonary opacities.      DX-CHEST-PORTABLE (1 VIEW)   Final Result      1.  Right-sided PICC line unchanged in position.      2.  Increased interstitial markings throughout the lung fields especially in the perihilar regions consistent with pulmonary edema and/or pneumonitis.      CT-TAVR CHEST-ABD-PELVIS W/WO POST PROCESS   Final Result      1.  Limited by lack of intravenous contrast      2.  Aortic valve Agatston score 4565 and the annulus measures approximately 450 sq mm      3.  Coronary arteries originate over a centimeter above the annulus      4.  Severe atherosclerotic change with marked narrowing of the iliofemoral runoff, right worse than left      5.  Multifocal groundglass greater than bronchial thickening is compatible with known Covid pneumonia. There is also evidence of lower lung zone scarring, possible interstitial lung disease and small pleural fluid      6.  Colonic diverticulosis, emphysema, left heart enlargement      DX-CHEST-PORTABLE (1 VIEW)   Final Result      Unchanged BILATERAL pneumonia      EC-ECHOCARDIOGRAM COMPLETE W/O CONT   Final Result      DX-CHEST-LIMITED (1 VIEW)   Final Result      1.  Multifocal pneumonia. Pulmonary edema could have a similar appearance.      US-RUQ   Final Result      Unremarkable RIGHT upper quadrant ultrasound             Assessment/Plan  * Acute respiratory failure with hypoxia (HCC)  Assessment & Plan  Respiratory failure in setting of covid -19 vs ? HF   Continue to  monitor oxygen saturation closely  Incentive spirometry  Continue  dexamethasone  His oxygen requirement is trending down.  I discussed plan of care  with patient and answered all his questions.    Heart failure with reduced ejection fraction (HCC)  Assessment & Plan  Echo showed severely reduced ejection fraction   Cardiology is following him.      Advance care planning  Assessment & Plan  Discussed by prior treatment team.  had a prolonged discussion with the patient regarding goals of care, diagnoses, prognosis, and CODE STATUS. We discussed his   prognosis and comorbidities. I spent  16  minutes on advanced care planning in addition to the time spent managing the other medical problems.    He requested to remain full code      Elevated liver enzymes  Assessment & Plan  Hepatitis panel came back negative.  right upper quadrant ultrasound did not show any acute abnormalities.  Liver enzyme is trending up.  Continue to monitor.    Elevated brain natriuretic peptide (BNP) level  Assessment & Plan  Likely in setting of COVID-19 infection  Euvolemic on exam  Echo showed reduced ejection fraction.    CKD (chronic kidney disease)  Assessment & Plan  Renal function remained stable compared to his labs  Continue to monitor  Avoid nephrotoxin    Pancytopenia (HCC)  Assessment & Plan  In setting of immunotherapy  Today white blood cell count is within normal limits.  Continue to monitor    Hypokalemia  Assessment & Plan  Replace as needed.  Continue to monitor.      Low bicarbonate  Assessment & Plan  In setting of CKD  Now resolved    COVID  Assessment & Plan    Activate sepsis protocol  COVID Isolation  Oxygen requirement has been trending down.  Continue on Decadron  He completed course of remdesivir.  Monitor oxygen saturation closely      NSTEMI (non-ST elevated myocardial infarction) ([facility])- (present on admission)  Assessment & Plan    Echo showing ef of 20% also severer Aortic stenosis  Cardiology following  No symptoms of acute chest pain.      Essential hypertension, benign- (present on admission)  Assessment & Plan  Mild hypotension.  Continue to  monitor.    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    last immunotherapy infusion to treat his cancer in [place] on 09/09/2022  Follow-up with outpatient          I discussed plan of care during multidisciplinary rounds regarding his current medical condition and plan of care.        VTE prophylaxis: heparin ppx    I have performed a physical exam and reviewed and updated ROS and Plan today (9/29/2022). In review of yesterday's note (9/28/2022), there are no changes except as documented above.

## 2022-09-30 LAB
1,25(OH)2D3 SERPL-MCNC: 83.6 PG/ML (ref 19.9–79.3)
ALBUMIN SERPL BCP-MCNC: 2.7 G/DL (ref 3.2–4.9)
ALBUMIN/GLOB SERPL: 1.3 G/DL
ALP SERPL-CCNC: 728 U/L (ref 30–99)
ALT SERPL-CCNC: 149 U/L (ref 2–50)
ANION GAP SERPL CALC-SCNC: 18 MMOL/L (ref 7–16)
AST SERPL-CCNC: 87 U/L (ref 12–45)
BASOPHILS # BLD AUTO: 0.1 % (ref 0–1.8)
BASOPHILS # BLD: 0.01 K/UL (ref 0–0.12)
BILIRUB SERPL-MCNC: 0.8 MG/DL (ref 0.1–1.5)
BUN SERPL-MCNC: 75 MG/DL (ref 8–22)
CALCIUM SERPL-MCNC: 8.5 MG/DL (ref 8.5–10.5)
CHLORIDE SERPL-SCNC: 103 MMOL/L (ref 96–112)
CO2 SERPL-SCNC: 17 MMOL/L (ref 20–33)
CREAT SERPL-MCNC: 1.72 MG/DL (ref 0.5–1.4)
EOSINOPHIL # BLD AUTO: 0.01 K/UL (ref 0–0.51)
EOSINOPHIL NFR BLD: 0.1 % (ref 0–6.9)
ERYTHROCYTE [DISTWIDTH] IN BLOOD BY AUTOMATED COUNT: 47.8 FL (ref 35.9–50)
GFR SERPLBLD CREATININE-BSD FMLA CKD-EPI: 41 ML/MIN/1.73 M 2
GLOBULIN SER CALC-MCNC: 2.1 G/DL (ref 1.9–3.5)
GLUCOSE SERPL-MCNC: 107 MG/DL (ref 65–99)
HCT VFR BLD AUTO: 30.4 % (ref 42–52)
HGB BLD-MCNC: 10.1 G/DL (ref 14–18)
IMM GRANULOCYTES # BLD AUTO: 0.07 K/UL (ref 0–0.11)
IMM GRANULOCYTES NFR BLD AUTO: 1 % (ref 0–0.9)
LYMPHOCYTES # BLD AUTO: 0.18 K/UL (ref 1–4.8)
LYMPHOCYTES NFR BLD: 2.6 % (ref 22–41)
MAGNESIUM SERPL-MCNC: 2 MG/DL (ref 1.5–2.5)
MCH RBC QN AUTO: 31.1 PG (ref 27–33)
MCHC RBC AUTO-ENTMCNC: 33.2 G/DL (ref 33.7–35.3)
MCV RBC AUTO: 93.5 FL (ref 81.4–97.8)
MONOCYTES # BLD AUTO: 0.19 K/UL (ref 0–0.85)
MONOCYTES NFR BLD AUTO: 2.7 % (ref 0–13.4)
NEUTROPHILS # BLD AUTO: 6.58 K/UL (ref 1.82–7.42)
NEUTROPHILS NFR BLD: 93.5 % (ref 44–72)
NRBC # BLD AUTO: 0 K/UL
NRBC BLD-RTO: 0 /100 WBC
PHOSPHATE SERPL-MCNC: 4.2 MG/DL (ref 2.5–4.5)
PLATELET # BLD AUTO: 83 K/UL (ref 164–446)
PMV BLD AUTO: 12.3 FL (ref 9–12.9)
POTASSIUM SERPL-SCNC: 4 MMOL/L (ref 3.6–5.5)
PROT SERPL-MCNC: 4.8 G/DL (ref 6–8.2)
RBC # BLD AUTO: 3.25 M/UL (ref 4.7–6.1)
SODIUM SERPL-SCNC: 138 MMOL/L (ref 135–145)
WBC # BLD AUTO: 7 K/UL (ref 4.8–10.8)

## 2022-09-30 PROCEDURE — 700102 HCHG RX REV CODE 250 W/ 637 OVERRIDE(OP): Performed by: STUDENT IN AN ORGANIZED HEALTH CARE EDUCATION/TRAINING PROGRAM

## 2022-09-30 PROCEDURE — 770000 HCHG ROOM/CARE - INTERMEDIATE ICU *

## 2022-09-30 PROCEDURE — 700102 HCHG RX REV CODE 250 W/ 637 OVERRIDE(OP): Performed by: INTERNAL MEDICINE

## 2022-09-30 PROCEDURE — 83735 ASSAY OF MAGNESIUM: CPT

## 2022-09-30 PROCEDURE — 700111 HCHG RX REV CODE 636 W/ 250 OVERRIDE (IP): Performed by: INTERNAL MEDICINE

## 2022-09-30 PROCEDURE — 84100 ASSAY OF PHOSPHORUS: CPT

## 2022-09-30 PROCEDURE — A9270 NON-COVERED ITEM OR SERVICE: HCPCS | Performed by: STUDENT IN AN ORGANIZED HEALTH CARE EDUCATION/TRAINING PROGRAM

## 2022-09-30 PROCEDURE — A9270 NON-COVERED ITEM OR SERVICE: HCPCS | Performed by: INTERNAL MEDICINE

## 2022-09-30 PROCEDURE — 94640 AIRWAY INHALATION TREATMENT: CPT

## 2022-09-30 PROCEDURE — 80053 COMPREHEN METABOLIC PANEL: CPT

## 2022-09-30 PROCEDURE — 94760 N-INVAS EAR/PLS OXIMETRY 1: CPT

## 2022-09-30 PROCEDURE — 85025 COMPLETE CBC W/AUTO DIFF WBC: CPT

## 2022-09-30 PROCEDURE — 99233 SBSQ HOSP IP/OBS HIGH 50: CPT | Performed by: INTERNAL MEDICINE

## 2022-09-30 RX ORDER — CHOLECALCIFEROL (VITAMIN D3) 125 MCG
5 CAPSULE ORAL ONCE
Status: DISCONTINUED | OUTPATIENT
Start: 2022-10-01 | End: 2022-09-30

## 2022-09-30 RX ORDER — CHOLECALCIFEROL (VITAMIN D3) 125 MCG
5 CAPSULE ORAL ONCE
Status: COMPLETED | OUTPATIENT
Start: 2022-10-01 | End: 2022-10-01

## 2022-09-30 RX ADMIN — HYDRALAZINE HYDROCHLORIDE 10 MG: 25 TABLET, FILM COATED ORAL at 21:19

## 2022-09-30 RX ADMIN — ACYCLOVIR 400 MG: 400 TABLET ORAL at 05:57

## 2022-09-30 RX ADMIN — HEPARIN SODIUM 5000 UNITS: 5000 INJECTION INTRAVENOUS; SUBCUTANEOUS at 21:19

## 2022-09-30 RX ADMIN — HEPARIN SODIUM 5000 UNITS: 5000 INJECTION INTRAVENOUS; SUBCUTANEOUS at 13:33

## 2022-09-30 RX ADMIN — HEPARIN SODIUM 5000 UNITS: 5000 INJECTION INTRAVENOUS; SUBCUTANEOUS at 06:00

## 2022-09-30 RX ADMIN — HYDRALAZINE HYDROCHLORIDE 10 MG: 25 TABLET, FILM COATED ORAL at 06:00

## 2022-09-30 RX ADMIN — ASPIRIN 81 MG: 81 TABLET, COATED ORAL at 05:57

## 2022-09-30 RX ADMIN — DEXAMETHASONE 6 MG: 4 TABLET ORAL at 05:57

## 2022-09-30 RX ADMIN — HYDRALAZINE HYDROCHLORIDE 10 MG: 25 TABLET, FILM COATED ORAL at 13:33

## 2022-09-30 ASSESSMENT — ENCOUNTER SYMPTOMS
MYALGIAS: 0
PALPITATIONS: 0
COUGH: 1
BACK PAIN: 0
SPUTUM PRODUCTION: 1
DIARRHEA: 0
NAUSEA: 0
SHORTNESS OF BREATH: 1
WEIGHT LOSS: 0
SPEECH CHANGE: 0
HEADACHES: 0
EYE PAIN: 0
SENSORY CHANGE: 0
NECK PAIN: 0
TREMORS: 0
BLURRED VISION: 0
FOCAL WEAKNESS: 0
HALLUCINATIONS: 0
TINGLING: 0
DOUBLE VISION: 0
CHILLS: 0
ABDOMINAL PAIN: 0
DIZZINESS: 0
ORTHOPNEA: 0
VOMITING: 0
CONSTIPATION: 0
FEVER: 0
PHOTOPHOBIA: 0

## 2022-09-30 ASSESSMENT — FIBROSIS 4 INDEX: FIB4 SCORE: 18.35

## 2022-09-30 ASSESSMENT — PAIN DESCRIPTION - PAIN TYPE: TYPE: ACUTE PAIN

## 2022-09-30 ASSESSMENT — LIFESTYLE VARIABLES: SUBSTANCE_ABUSE: 0

## 2022-09-30 NOTE — PROGRESS NOTES
Hospital Medicine Daily Progress Note    Date of Service  9/30/2022    Chief Complaint  Ceferino Fitch is a 72 y.o. male admitted 9/21/2022 with elevated troponin     Hospital Course  This is a a 72 y.o. 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    Patient initially presented to OSH and was transferred here for high level of care.  Cardiology has been consulted and patient admitted for further work up and treatment.  Due to high oxygen requirement patient transferred to ICU on September 25, 2022.  Cardiology consulted and he underwent right heart catheterization on September 25, 2022.  Cardiology and critical care has been following him.  He transferred to [facility] level of care on September 27, 2022.  His oxygen requirement trending down and on September 30, 2022 his oxygen requirement trended down to 7 L.  Plan is to transfer him to telemetry.  Cardiology evaluated him and recommended when he is stable consider reconsultation from cardiology and stress echo.    Interval Problem Update    09/30/22      I evaluated and examined him at the bedside.    Oxygen requirement is trended down to 7 L of oxygen but this morning he developed hypoxia again and was placed on high flow nasal cannula.   Cardiology evaluated him and recommended when he is stable consider reconsultation from cardiology for stress echo.  Current blood pressure count is 7.0.  Liver enzymes has been trending down.  Hemoglobin remained stable.  Current bicarb is 17.  Plan is to transfer him to telemetry floor for further management as currently he is not on high flow nasal cannula and hemodynamically stable.    I discussed plan of care with bedside RN.    I have discussed this patient's plan of care and discharge plan at IDT rounds today with Case  Management, Nursing, Nursing leadership, and other members of the IDT team.    Consultants/Specialty  cardiology  Critical care   Code Status  Full Code    Disposition  Patient is not medically cleared for discharge.   Anticipate discharge to to home with close outpatient follow-up.  I have placed the appropriate orders for post-discharge needs.    Review of Systems  Review of Systems   Constitutional:  Negative for chills, fever and weight loss.   HENT:  Negative for hearing loss and tinnitus.    Eyes:  Negative for blurred vision, double vision, photophobia and pain.   Respiratory:  Positive for cough (Improving), sputum production and shortness of breath (Improving).    Cardiovascular:  Negative for chest pain, palpitations, orthopnea and leg swelling.   Gastrointestinal:  Negative for abdominal pain, constipation, diarrhea, nausea and vomiting.   Genitourinary:  Negative for dysuria, frequency and urgency.   Musculoskeletal:  Negative for back pain, joint pain, myalgias and neck pain.   Skin:  Negative for rash.   Neurological:  Negative for dizziness, tingling, tremors, sensory change, speech change, focal weakness and headaches.        Loss of taste sensation   Psychiatric/Behavioral:  Negative for hallucinations and substance abuse.    All other systems reviewed and are negative.     Physical Exam  Temp:  [35.6 °C (96.1 °F)-35.8 °C (96.4 °F)] 35.8 °C (96.4 °F)  Pulse:  [70-93] 70  Resp:  [15-30] 15  BP: ()/(43-57) 114/53  SpO2:  [85 %-99 %] 96 %    Physical Exam  Vitals reviewed.   Constitutional:       General: He is not in acute distress.     Comments: He was laying in bed without any acute distress.   HENT:      Head: Normocephalic and atraumatic. No contusion.      Right Ear: External ear normal.      Left Ear: External ear normal.      Nose: Nose normal.      Comments: Oxygen nasal cannula     Mouth/Throat:      Pharynx: No oropharyngeal exudate.   Eyes:      General:         Right eye: No  discharge.         Left eye: No discharge.      Pupils: Pupils are equal, round, and reactive to light.   Cardiovascular:      Rate and Rhythm: Normal rate and regular rhythm.      Heart sounds: No murmur heard.    No friction rub. No gallop.   Pulmonary:      Effort: Pulmonary effort is normal.      Breath sounds: No wheezing or rhonchi.   Abdominal:      General: Bowel sounds are normal. There is no distension.      Palpations: Abdomen is soft.      Tenderness: There is no abdominal tenderness. There is no rebound.   Musculoskeletal:         General: No swelling or tenderness. Normal range of motion.      Cervical back: No rigidity. No muscular tenderness.   Skin:     General: Skin is warm and dry.      Coloration: Skin is not jaundiced.   Neurological:      General: No focal deficit present.      Mental Status: He is alert and oriented to person, place, and time.      Cranial Nerves: No cranial nerve deficit.      Sensory: No sensory deficit.      Comments: Motor 5/5 in all 4 extremities   Psychiatric:         Mood and Affect: Mood normal.     I performed physical exam on September 30, 2022 due to a similar without any acute changes.    Fluids    Intake/Output Summary (Last 24 hours) at 9/30/2022 0745  Last data filed at 9/30/2022 0604  Gross per 24 hour   Intake 200 ml   Output 1300 ml   Net -1100 ml         Laboratory  Recent Labs     09/28/22  0515 09/29/22  0321 09/30/22  0340   WBC 4.5* 6.2 7.0   RBC 3.07* 3.28* 3.25*   HEMOGLOBIN 9.6* 10.2* 10.1*   HEMATOCRIT 27.8* 29.9* 30.4*   MCV 90.6 91.2 93.5   MCH 31.3 31.1 31.1   MCHC 34.5 34.1 33.2*   RDW 46.8 46.9 47.8   PLATELETCT 66* 93* 83*   MPV 11.7 11.6 12.3       Recent Labs     09/28/22  0515 09/29/22  0321 09/30/22  0340   SODIUM 140 137 138   POTASSIUM 4.3 4.1 4.0   CHLORIDE 103 102 103   CO2 20 18* 17*   GLUCOSE 92 97 107*   BUN 74* 70* 75*   CREATININE 1.73* 1.67* 1.72*   CALCIUM 8.1* 8.5 8.5                         Imaging  DX-CHEST-PORTABLE (1 VIEW)    Final Result      1.  Right-sided [place]-Frannie catheter tip projects in the distal right main pulmonary artery region. No pneumothorax.   2.  Mild interval worsening in hazy bilateral pulmonary opacities.         DX-CHEST-PORTABLE (1 VIEW)   Final Result      1.  Right-sided [place]-Frannie catheter tip projects in the distal right main pulmonary artery region. No pneumothorax.   2.  Probable slight improvement in hazy bilateral pulmonary opacities.      DX-CHEST-PORTABLE (1 VIEW)   Final Result      1.  Right-sided PICC line unchanged in position.      2.  Increased interstitial markings throughout the lung fields especially in the perihilar regions consistent with pulmonary edema and/or pneumonitis.      CT-TAVR CHEST-ABD-PELVIS W/WO POST PROCESS   Final Result      1.  Limited by lack of intravenous contrast      2.  Aortic valve Agatston score 4565 and the annulus measures approximately 450 sq mm      3.  Coronary arteries originate over a centimeter above the annulus      4.  Severe atherosclerotic change with marked narrowing of the iliofemoral runoff, right worse than left      5.  Multifocal groundglass greater than bronchial thickening is compatible with known Covid pneumonia. There is also evidence of lower lung zone scarring, possible interstitial lung disease and small pleural fluid      6.  Colonic diverticulosis, emphysema, left heart enlargement      DX-CHEST-PORTABLE (1 VIEW)   Final Result      Unchanged BILATERAL pneumonia      EC-ECHOCARDIOGRAM COMPLETE W/O CONT   Final Result      DX-CHEST-LIMITED (1 VIEW)   Final Result      1.  Multifocal pneumonia. Pulmonary edema could have a similar appearance.      US-RUQ   Final Result      Unremarkable RIGHT upper quadrant ultrasound             Assessment/Plan  * Acute respiratory failure with hypoxia (HCC)  Assessment & Plan  Respiratory failure in setting of covid -19 vs ? HF   Continue to  monitor oxygen saturation closely  Incentive spirometry  Continue   dexamethasone  His oxygen requirement is trending down.  I discussed plan of care with patient and answered all his questions.    Heart failure with reduced ejection fraction (HCC)  Assessment & Plan  Echo showed severely reduced ejection fraction   Cardiology is following him.      Advance care planning  Assessment & Plan  Discussed by prior treatment team.  had a prolonged discussion with the patient regarding goals of care, diagnoses, prognosis, and CODE STATUS. We discussed his   prognosis and comorbidities. I spent  16  minutes on advanced care planning in addition to the time spent managing the other medical problems.    He requested to remain full code      Elevated liver enzymes  Assessment & Plan  Hepatitis panel came back negative.  right upper quadrant ultrasound did not show any acute abnormalities.  Could be secondary to remdesivir.  Liver enzyme is trending down.  Continue to monitor.    Elevated brain natriuretic peptide (BNP) level  Assessment & Plan  Likely in setting of COVID-19 infection  Euvolemic on exam  Echo showed reduced ejection fraction.    CKD (chronic kidney disease)  Assessment & Plan  Renal function remained stable compared to his labs  Continue to monitor  Avoid nephrotoxin    Pancytopenia (HCC)  Assessment & Plan  In setting of immunotherapy  Today white blood cell count is within normal limits.  Continue to monitor    Hypokalemia  Assessment & Plan  Replace as needed.  Continue to monitor.      Low bicarbonate  Assessment & Plan  In setting of CKD  Continue to monitor.    COVID  Assessment & Plan    Activate sepsis protocol  COVID Isolation  Oxygen requirement has been trending down.  Continue on Decadron last dose October 1, 2022  He completed course of remdesivir.  Monitor oxygen saturation closely      NSTEMI (non-ST elevated myocardial infarction) ([facility])- (present on admission)  Assessment & Plan    Echo showing ef of 20% also severer Aortic stenosis  Cardiology evaluated him and  recommended to reconsult for possible stress echo when he is more stable.  No symptoms of acute chest pain.      Essential hypertension, benign- (present on admission)  Assessment & Plan  Mild hypotension.  Continue to monitor.    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    last immunotherapy infusion to treat his cancer in [place] on 09/09/2022  Follow-up with outpatient          I discussed plan of care during multidisciplinary rounds regarding his current medical condition and plan of care.    I discussed plan of care with patient and his wife at the bedside and answered all of their questions.      VTE prophylaxis: heparin ppx    I have performed a physical exam and reviewed and updated ROS and Plan today (9/30/2022). In review of yesterday's note (9/29/2022), there are no changes except as documented above.

## 2022-09-30 NOTE — CARE PLAN
The patient is Watcher - Medium risk of patient condition declining or worsening    Shift Goals  Clinical Goals: Stable oxygen, wean O2  Patient Goals: Breath better, sleep  Family Goals: KARL    Progress made toward(s) clinical / shift goals:  Pt weaned from High flow to 8 L humidified nasal cannula. Oxygenation improved when pt lays on side, ranging from 91-96%. Pt dyspneic with minimal exertion and desats down to 80%, taking several minutes to go back up to >90%. Pt needed to be placed on non-breather for short period of time this AM after pivoting to bedside commode for a bowel movement, oxygen decreased to 64%.   Pt w/ no complaints of pain. A&Ox4. BP 's. Continue to wean and monitor respiratory status.     Problem: Hemodynamics  Goal: Patient's hemodynamics, fluid balance and neurologic status will be stable or improve  Outcome: Progressing     Problem: Respiratory  Goal: Patient will achieve/maintain optimum respiratory ventilation and gas exchange  Outcome: Progressing     Problem: Nutrition  Goal: Enteral nutrition will be maintained or improve  Outcome: Progressing     Problem: Urinary Elimination  Goal: Establish and maintain regular urinary output  Outcome: Progressing     Problem: Pain - Standard  Goal: Alleviation of pain or a reduction in pain to the patient’s comfort goal  Outcome: Progressing       Patient is not progressing towards the following goals:

## 2022-09-30 NOTE — CARE PLAN
The patient is Watcher - Medium risk of patient condition declining or worsening    Shift Goals  Clinical Goals: Stable oxygen, wean O2  Patient Goals: Breath better, sleep  Family Goals: KARL    Progress made toward(s) clinical / shift goals:  PT is not progressing toward weaning 02. Remains on same amount of oxygen.     Patient is not progressing towards the following goals:

## 2022-09-30 NOTE — INFECTION CONTROL
This patient is considered COVID RECOVERED.    Patient initially tested positive for COVID on 9/13/2022.  Patient is greater than or equal to 15 days from symptom onset and/or positive test, with symptom improvement.  Per the CDC guidance, this patient no longer requires transmission based precautions.  Patient may be placed on any unit per the bed assignment policy, including placement in a semi-private room with a roommate.

## 2022-10-01 ENCOUNTER — APPOINTMENT (OUTPATIENT)
Dept: RADIOLOGY | Facility: MEDICAL CENTER | Age: 72
DRG: 177 | End: 2022-10-01
Attending: INTERNAL MEDICINE
Payer: MEDICARE

## 2022-10-01 PROBLEM — J93.12 SECONDARY SPONTANEOUS PNEUMOTHORAX: Status: ACTIVE | Noted: 2022-10-01

## 2022-10-01 LAB
ALBUMIN SERPL BCP-MCNC: 2.4 G/DL (ref 3.2–4.9)
ALBUMIN/GLOB SERPL: 1.1 G/DL
ALP SERPL-CCNC: 554 U/L (ref 30–99)
ALT SERPL-CCNC: 98 U/L (ref 2–50)
ANION GAP SERPL CALC-SCNC: 11 MMOL/L (ref 7–16)
AST SERPL-CCNC: 37 U/L (ref 12–45)
BASOPHILS # BLD AUTO: 0.2 % (ref 0–1.8)
BASOPHILS # BLD: 0.01 K/UL (ref 0–0.12)
BILIRUB SERPL-MCNC: 0.7 MG/DL (ref 0.1–1.5)
BUN SERPL-MCNC: 75 MG/DL (ref 8–22)
CALCIUM SERPL-MCNC: 8.4 MG/DL (ref 8.5–10.5)
CHLORIDE SERPL-SCNC: 107 MMOL/L (ref 96–112)
CO2 SERPL-SCNC: 20 MMOL/L (ref 20–33)
CREAT SERPL-MCNC: 1.46 MG/DL (ref 0.5–1.4)
EOSINOPHIL # BLD AUTO: 0 K/UL (ref 0–0.51)
EOSINOPHIL NFR BLD: 0 % (ref 0–6.9)
ERYTHROCYTE [DISTWIDTH] IN BLOOD BY AUTOMATED COUNT: 49 FL (ref 35.9–50)
GFR SERPLBLD CREATININE-BSD FMLA CKD-EPI: 50 ML/MIN/1.73 M 2
GLOBULIN SER CALC-MCNC: 2.1 G/DL (ref 1.9–3.5)
GLUCOSE SERPL-MCNC: 106 MG/DL (ref 65–99)
HCT VFR BLD AUTO: 27.3 % (ref 42–52)
HGB BLD-MCNC: 9.2 G/DL (ref 14–18)
IMM GRANULOCYTES # BLD AUTO: 0.03 K/UL (ref 0–0.11)
IMM GRANULOCYTES NFR BLD AUTO: 0.5 % (ref 0–0.9)
LYMPHOCYTES # BLD AUTO: 0.06 K/UL (ref 1–4.8)
LYMPHOCYTES NFR BLD: 1.1 % (ref 22–41)
MAGNESIUM SERPL-MCNC: 1.8 MG/DL (ref 1.5–2.5)
MCH RBC QN AUTO: 31.6 PG (ref 27–33)
MCHC RBC AUTO-ENTMCNC: 33.7 G/DL (ref 33.7–35.3)
MCV RBC AUTO: 93.8 FL (ref 81.4–97.8)
MONOCYTES # BLD AUTO: 0.14 K/UL (ref 0–0.85)
MONOCYTES NFR BLD AUTO: 2.5 % (ref 0–13.4)
NEUTROPHILS # BLD AUTO: 5.3 K/UL (ref 1.82–7.42)
NEUTROPHILS NFR BLD: 95.7 % (ref 44–72)
NRBC # BLD AUTO: 0 K/UL
NRBC BLD-RTO: 0 /100 WBC
PHOSPHATE SERPL-MCNC: 3.7 MG/DL (ref 2.5–4.5)
PLATELET # BLD AUTO: 57 K/UL (ref 164–446)
PMV BLD AUTO: 12 FL (ref 9–12.9)
POTASSIUM SERPL-SCNC: 4.2 MMOL/L (ref 3.6–5.5)
PROT SERPL-MCNC: 4.5 G/DL (ref 6–8.2)
RBC # BLD AUTO: 2.91 M/UL (ref 4.7–6.1)
SODIUM SERPL-SCNC: 138 MMOL/L (ref 135–145)
WBC # BLD AUTO: 5.5 K/UL (ref 4.8–10.8)

## 2022-10-01 PROCEDURE — 700111 HCHG RX REV CODE 636 W/ 250 OVERRIDE (IP): Performed by: INTERNAL MEDICINE

## 2022-10-01 PROCEDURE — 700102 HCHG RX REV CODE 250 W/ 637 OVERRIDE(OP): Performed by: STUDENT IN AN ORGANIZED HEALTH CARE EDUCATION/TRAINING PROGRAM

## 2022-10-01 PROCEDURE — 85025 COMPLETE CBC W/AUTO DIFF WBC: CPT

## 2022-10-01 PROCEDURE — 99233 SBSQ HOSP IP/OBS HIGH 50: CPT | Performed by: INTERNAL MEDICINE

## 2022-10-01 PROCEDURE — 700102 HCHG RX REV CODE 250 W/ 637 OVERRIDE(OP): Performed by: INTERNAL MEDICINE

## 2022-10-01 PROCEDURE — 94760 N-INVAS EAR/PLS OXIMETRY 1: CPT

## 2022-10-01 PROCEDURE — 84100 ASSAY OF PHOSPHORUS: CPT

## 2022-10-01 PROCEDURE — 71045 X-RAY EXAM CHEST 1 VIEW: CPT

## 2022-10-01 PROCEDURE — 770000 HCHG ROOM/CARE - INTERMEDIATE ICU *

## 2022-10-01 PROCEDURE — A9270 NON-COVERED ITEM OR SERVICE: HCPCS | Performed by: INTERNAL MEDICINE

## 2022-10-01 PROCEDURE — 80053 COMPREHEN METABOLIC PANEL: CPT

## 2022-10-01 PROCEDURE — 94640 AIRWAY INHALATION TREATMENT: CPT

## 2022-10-01 PROCEDURE — 71250 CT THORAX DX C-: CPT

## 2022-10-01 PROCEDURE — A9270 NON-COVERED ITEM OR SERVICE: HCPCS | Performed by: STUDENT IN AN ORGANIZED HEALTH CARE EDUCATION/TRAINING PROGRAM

## 2022-10-01 PROCEDURE — 83735 ASSAY OF MAGNESIUM: CPT

## 2022-10-01 RX ORDER — LIDOCAINE HYDROCHLORIDE 20 MG/ML
5 INJECTION, SOLUTION EPIDURAL; INFILTRATION; INTRACAUDAL; PERINEURAL
Status: DISCONTINUED | OUTPATIENT
Start: 2022-10-01 | End: 2022-10-12 | Stop reason: HOSPADM

## 2022-10-01 RX ORDER — GUAIFENESIN/DEXTROMETHORPHAN 100-10MG/5
5 SYRUP ORAL EVERY 6 HOURS PRN
Status: DISCONTINUED | OUTPATIENT
Start: 2022-10-01 | End: 2022-10-12 | Stop reason: HOSPADM

## 2022-10-01 RX ADMIN — DEXAMETHASONE 6 MG: 4 TABLET ORAL at 06:04

## 2022-10-01 RX ADMIN — HYDRALAZINE HYDROCHLORIDE 10 MG: 25 TABLET, FILM COATED ORAL at 06:04

## 2022-10-01 RX ADMIN — Medication 5 MG: at 01:34

## 2022-10-01 RX ADMIN — HEPARIN SODIUM 5000 UNITS: 5000 INJECTION INTRAVENOUS; SUBCUTANEOUS at 06:04

## 2022-10-01 RX ADMIN — HEPARIN SODIUM 5000 UNITS: 5000 INJECTION INTRAVENOUS; SUBCUTANEOUS at 14:13

## 2022-10-01 RX ADMIN — GUAIFENESIN SYRUP AND DEXTROMETHORPHAN 5 ML: 100; 10 SYRUP ORAL at 20:20

## 2022-10-01 RX ADMIN — ASPIRIN 81 MG: 81 TABLET, COATED ORAL at 06:04

## 2022-10-01 RX ADMIN — HYDRALAZINE HYDROCHLORIDE 10 MG: 25 TABLET, FILM COATED ORAL at 14:12

## 2022-10-01 RX ADMIN — ACYCLOVIR 400 MG: 400 TABLET ORAL at 06:04

## 2022-10-01 RX ADMIN — HYDRALAZINE HYDROCHLORIDE 10 MG: 25 TABLET, FILM COATED ORAL at 21:23

## 2022-10-01 ASSESSMENT — ENCOUNTER SYMPTOMS
FEVER: 0
BACK PAIN: 0
HALLUCINATIONS: 0
EYE PAIN: 0
COUGH: 1
SPEECH CHANGE: 0
CHILLS: 0
SHORTNESS OF BREATH: 1
MYALGIAS: 0
HEADACHES: 0
DIARRHEA: 0
HEMOPTYSIS: 1
PALPITATIONS: 0
FOCAL WEAKNESS: 0
SPUTUM PRODUCTION: 1
ABDOMINAL PAIN: 0
TREMORS: 0
DOUBLE VISION: 0
ORTHOPNEA: 0
BLURRED VISION: 0
TINGLING: 0
DIZZINESS: 0
SENSORY CHANGE: 0
NAUSEA: 0
CONSTIPATION: 0
VOMITING: 0
WEIGHT LOSS: 0
PHOTOPHOBIA: 0
NECK PAIN: 0

## 2022-10-01 ASSESSMENT — PATIENT HEALTH QUESTIONNAIRE - PHQ9
SUM OF ALL RESPONSES TO PHQ9 QUESTIONS 1 AND 2: 0
2. FEELING DOWN, DEPRESSED, IRRITABLE, OR HOPELESS: NOT AT ALL
1. LITTLE INTEREST OR PLEASURE IN DOING THINGS: NOT AT ALL

## 2022-10-01 ASSESSMENT — PAIN DESCRIPTION - PAIN TYPE: TYPE: ACUTE PAIN

## 2022-10-01 ASSESSMENT — LIFESTYLE VARIABLES: SUBSTANCE_ABUSE: 0

## 2022-10-01 NOTE — PROGRESS NOTES
Pulmonary Progress Note    Date of admission  9/21/2022    Chief Complaint  72 y.o. male admitted 9/21/2022 with worsening shortness of breath    Hospital Course  72-year-old male with a history of recent BRADLEY and COVID-19 infection July, again PCR positive in September, hypertension, aortic stenosis and follicular lymphoma on immunotherapy with [place] and Obinutuzumab.  He was admitted on 9/21 with an NSTEMI and worsening shortness of breath with an echocardiogram showing ejection fraction of 20% with inferolateral hypokinesis.  He was admitted to the ICU, a PA catheter was placed and he was started on diuretics underwent evaluation for possible TAVR.  Also started on Decadron, doxycycline, and remdesivir for persistent vs reinfection with COVID.  In the ICU was on high flow nasal cannula.     CXR due to persistent hypoxia was performed today which showed an incidental right apical pneumothorax with right-sided subcutaneous air extending into the neck as well as patchy bilateral alveolar opacities    Interval Problem Update  Reviewed last 24 hour events:    Consulted due to per persistent hypoxia, which showed an incidental right apical pneumothorax with right subcutaneous air  Persistent patchy bilateral alveolar opacities    Subsequent CT of the chest shows extensive pneumomediastinum with small right apical pneumothorax, persistent unchanged patchy bilateral groundglass opacities    Patient has been coughing frequently for the past couple days.  He is in no respiratory distress.  Also dealing with a nosebleed from the high flow nasal cannula.  Remains on high flow, 35 L/60%.  He is afebrile.  No leukocytosis.  net -9 L.  Creatinine elevated but is slowly improving compared to earlier this hospitalization.      Review of Systems  Review of Systems   Constitutional:  Positive for malaise/fatigue.   HENT:  Positive for nosebleeds.    Respiratory:  Positive for cough, hemoptysis and sputum production.    All other  systems reviewed and are negative.     Vital Signs for last 24 hours   Temp:  [35.8 °C (96.4 °F)-35.9 °C (96.6 °F)] 35.9 °C (96.6 °F)  Pulse:  [72-90] 86  Resp:  [15-28] 21  BP: ()/(46-55) 118/53  SpO2:  [84 %-99 %] 88 %    Physical Exam   Physical Exam  Vitals and nursing note reviewed.   Constitutional:       General: He is not in acute distress.     Appearance: He is well-developed. He is ill-appearing. He is not diaphoretic.   HENT:      Nose: Nose normal.      Mouth/Throat:      Pharynx: No oropharyngeal exudate.   Eyes:      General: No scleral icterus.        Right eye: No discharge.         Left eye: No discharge.      Conjunctiva/sclera: Conjunctivae normal.      Pupils: Pupils are equal, round, and reactive to light.   Neck:      Thyroid: No thyromegaly.      Vascular: No JVD.      Trachea: No tracheal deviation.   Cardiovascular:      Rate and Rhythm: Normal rate and regular rhythm.      Heart sounds: Normal heart sounds. No murmur heard.  Pulmonary:      Effort: Pulmonary effort is normal. No respiratory distress.      Breath sounds: Normal breath sounds. No stridor. No wheezing or rales.   Abdominal:      General: There is no distension.      Palpations: Abdomen is soft.      Tenderness: There is no abdominal tenderness. There is no guarding.   Musculoskeletal:         General: No tenderness. Normal range of motion.      Cervical back: Neck supple.   Lymphadenopathy:      Cervical: No cervical adenopathy.   Skin:     General: Skin is warm and dry.      Capillary Refill: Capillary refill takes less than 2 seconds.      Coloration: Skin is not pale.      Findings: No erythema.      Comments: RUE picc line site c/d/I  Palpable crepitus over right trapezius   Neurological:      Mental Status: He is alert and oriented to person, place, and time.      Sensory: No sensory deficit.      Motor: No abnormal muscle tone.      Coordination: Coordination normal.      Deep Tendon Reflexes: Reflexes normal.    Psychiatric:         Behavior: Behavior normal.         Thought Content: Thought content normal.         Judgment: Judgment normal.     Medications  Current Facility-Administered Medications   Medication Dose Route Frequency Provider Last Rate Last Admin    menthol ([place]) lozenge 1 Lozenge  1 Lozenge Oral Q2HRS PRN Fransisco Hutchins M.D.        guaiFENesin dextromethorphan (ROBITUSSIN DM) 100-10 MG/5ML syrup 5 mL  5 mL Oral Q6HRS PRN Fransisco Hutchins M.D.        lidocaine 2 % nebulizer solution 5 mL  5 mL Nebulization Q6HRS PRN (RT) Fransisco Hutchins M.D.        hydrALAZINE (APRESOLINE) tablet 10 mg  10 mg Oral Q8HRS Marcos Ball M.D.   10 mg at 10/01/22 1412    acyclovir (Zovirax) tablet 400 mg  400 mg Oral DAILY Sally Mondragon M.D.   400 mg at 10/01/22 0604    senna-docusate (PERICOLACE or SENOKOT S) 8.6-50 MG per tablet 2 Tablet  2 Tablet Oral BID Sally Mondragon M.D.        And    polyethylene glycol/lytes (MIRALAX) PACKET 1 Packet  1 Packet Oral QDAY PRN Sally Mondragon M.D.        And    magnesium hydroxide (MILK OF MAGNESIA) suspension 30 mL  30 mL Oral QDAY PRN Sally Mondragon M.D.        And    bisacodyl (DULCOLAX) suppository 10 mg  10 mg Rectal QDAY PRN Sally Mondragon M.D.        aspirin EC (ECOTRIN) tablet 81 mg  81 mg Oral DAILY Jennifer Villarreal M.D.   81 mg at 10/01/22 0604    heparin injection 5,000 Units  5,000 Units Subcutaneous Q8HRS Jennifer Villarreal M.D.   5,000 Units at 10/01/22 1413       Fluids    Intake/Output Summary (Last 24 hours) at 10/1/2022 1935  Last data filed at 10/1/2022 1900  Gross per 24 hour   Intake 1140 ml   Output 1200 ml   Net -60 ml       Laboratory          Recent Labs     09/29/22  0321 09/30/22  0340 10/01/22  0255   SODIUM 137 138 138   POTASSIUM 4.1 4.0 4.2   CHLORIDE 102 103 107   CO2 18* 17* 20   BUN 70* 75* 75*   CREATININE 1.67* 1.72* 1.46*   MAGNESIUM 2.2 2.0 1.8   PHOSPHORUS 4.5 4.2 3.7   CALCIUM 8.5 8.5 8.4*     Recent Labs      09/29/22  0321 09/30/22  0340 10/01/22  0255   ALTSGPT 219* 149* 98*   ASTSGOT 346* 87* 37   ALKPHOSPHAT 850* 728* 554*   TBILIRUBIN 1.3 0.8 0.7   GLUCOSE 97 107* 106*     Recent Labs     09/29/22  0321 09/30/22  0340 10/01/22  0255   WBC 6.2 7.0 5.5   NEUTSPOLYS 89.80* 93.50* 95.70*   LYMPHOCYTES 3.90* 2.60* 1.10*   MONOCYTES 5.00 2.70 2.50   EOSINOPHILS 0.00 0.10 0.00   BASOPHILS 0.00 0.10 0.20   ASTSGOT 346* 87* 37   ALTSGPT 219* 149* 98*   ALKPHOSPHAT 850* 728* 554*   TBILIRUBIN 1.3 0.8 0.7     Recent Labs     09/29/22  0321 09/30/22  0340 10/01/22  0255   RBC 3.28* 3.25* 2.91*   HEMOGLOBIN 10.2* 10.1* 9.2*   HEMATOCRIT 29.9* 30.4* 27.3*   PLATELETCT 93* 83* 57*     Imaging  X-Ray:  I have personally reviewed the images and compared with prior images.  CT:    Reviewed  CT of the chest shows extensive pneumomediastinum with small right apical pneumothorax, persistent unchanged patchy bilateral groundglass opacities    Assessment/Plan    * Acute respiratory failure with hypoxia (HCC)  Assessment & Plan  CT showing persistent bilateral patchy ground glass opacities  Has been attributed to persistent vs recurrent COVID with decadron, remdesivir  Also has received empiric abx with doxycycline  COVID vs drug induced pneumonitis from Obinutuzumab  Completed steroids for COVID- depending on clinical trajectory will consider further steroids for empiric pneumonitis treatment as too ill for bronchoscopy    Secondary spontaneous pneumothorax  Assessment & Plan  Pneumothorax and subcutaneous emphysema  Suspect due to fits of coughing  Minimal pleural air- clinically stable no resp distress  Closely monitor and aggressive cough suppression  Repeat CXR 2000 and in AM    I have performed a physical exam and reviewed and updated ROS and Plan today (10/1/2022). In review of yesterday's note (9/30/2022), there are no changes except as documented above.     Discussed patient condition and risk of morbidity and/or mortality with  Hospitalist, RN, and Patient    This note was generated using voice recognition software which has a chance of producing errors of grammar and content.  I have made every reasonable attempt to find and correct any errors, but it should be expected that some may not be found prior to finalization of this note.  __________  Fransisco Hutchins MD  Pulmonary and Critical Care Medicine  [facility]

## 2022-10-01 NOTE — PROGRESS NOTES
Pt desat down to 80% at rest on 15L oxymask. Pt resting on left side, in no distress. Respiratory at bedside placed pt back on high flow nasal cannula 35L, 60% FiO2.

## 2022-10-01 NOTE — CARE PLAN
The patient is Watcher - Medium risk of patient condition declining or worsening    Shift Goals  Clinical Goals: Improve oxygenation, hemodynamic stability  Patient Goals: Rest tonight  Family Goals: KARL    Progress made toward(s) clinical / shift goals:  Pt remains on oxymask at 13L, saturation 93%. Dyspnea with minimal exertion, saturation dropping to 70%. BP's stable  systolic.     Problem: Knowledge Deficit - Standard  Goal: Patient and family/care givers will demonstrate understanding of plan of care, disease process/condition, diagnostic tests and medications  Outcome: Progressing     Problem: Hemodynamics  Goal: Patient's hemodynamics, fluid balance and neurologic status will be stable or improve  Outcome: Progressing     Problem: Fall Risk  Goal: Patient will remain free from falls  Outcome: Progressing     Problem: Respiratory  Goal: Patient will achieve/maintain optimum respiratory ventilation and gas exchange  Outcome: Progressing     Problem: Urinary - Renal Perfusion  Goal: Ability to achieve and maintain adequate renal perfusion and functioning will improve  Outcome: Progressing     Problem: Pain - Standard  Goal: Alleviation of pain or a reduction in pain to the patient’s comfort goal  Outcome: Progressing       Patient is not progressing towards the following goals:

## 2022-10-01 NOTE — CARE PLAN
Problem: Humidified High Flow Nasal Cannula  Goal: Maintain adequate oxygenation dependent on patient condition  Description: Target End Date:  resolve prior to discharge or when underlying condition is resolved/stabilized    1.  Implement humidified high flow oxygen therapy  2.  Titrate high flow oxygen to maintain appropriate SpO2  Outcome: Met     Titrated off HF to 10 lpm oxy mask, tolerating well at this time.

## 2022-10-01 NOTE — PROGRESS NOTES
Hospital Medicine Daily Progress Note    Date of Service  10/1/2022    Chief Complaint  Ceferino Fitch is a 72 y.o. male admitted 9/21/2022 with elevated troponin     Hospital Course  This is a a 72 y.o. 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    Patient initially presented to OSH and was transferred here for high level of care.  Cardiology has been consulted and patient admitted for further work up and treatment.  Due to high oxygen requirement patient transferred to ICU on September 25, 2022.  Cardiology consulted and he underwent right heart catheterization on September 25, 2022.  Cardiology and critical care has been following him.  He transferred to [facility] level of care on September 27, 2022.  His oxygen requirement trending down and on September 30, 2022 his oxygen requirement trended down to 7 L.  Plan is to transfer him to telemetry.  Cardiology evaluated him and recommended when he is stable consider reconsultation from cardiology and stress echo.    Interval Problem Update    10/01/22    I evaluated and examined him at the bedside.  He has episode of epistaxis this morning and it was resolved.  Current oxygen requirement is 35 L.  I discussed plan of care with patient and his wife at the bedside.  Ordered chest x-ray.  Other vitals are stable.  Current white blood cell count is 5.5.  Hemoglobin remained stable 9.2.  CMP showed mildly elevated glucose of 106 BUN/creatinine is elevated and current creatinine is trending down.  Liver enzyme is trending down.  I discussed plan of care with him and answered all his questions.      Addendum: I ordered chest x-ray and he found to have small right apical pneumothorax with subcutaneous air extending into the neck.  I requested consult with pulmonology.    I  discussed plan of care with bedside RN.    I have discussed this patient's plan of care and discharge plan at IDT rounds today with Case Management, Nursing, Nursing leadership, and other members of the IDT team.    Consultants/Specialty  cardiology  Critical care   Code Status  Full Code    Disposition  Patient is not medically cleared for discharge.   Anticipate discharge to to home with close outpatient follow-up.  I have placed the appropriate orders for post-discharge needs.    Review of Systems  Review of Systems   Constitutional:  Negative for chills, fever and weight loss.   HENT:  Positive for nosebleeds. Negative for hearing loss and tinnitus.    Eyes:  Negative for blurred vision, double vision, photophobia and pain.   Respiratory:  Positive for cough (Improving), sputum production and shortness of breath (Improving).    Cardiovascular:  Negative for chest pain, palpitations, orthopnea and leg swelling.   Gastrointestinal:  Negative for abdominal pain, constipation, diarrhea, nausea and vomiting.   Genitourinary:  Negative for dysuria, frequency and urgency.   Musculoskeletal:  Negative for back pain, joint pain, myalgias and neck pain.   Skin:  Negative for rash.   Neurological:  Negative for dizziness, tingling, tremors, sensory change, speech change, focal weakness and headaches.        Loss of taste sensation   Psychiatric/Behavioral:  Negative for hallucinations and substance abuse.    All other systems reviewed and are negative.     Physical Exam  Temp:  [35.8 °C (96.4 °F)-35.9 °C (96.6 °F)] 35.9 °C (96.6 °F)  Pulse:  [] 73  Resp:  [16-34] 16  BP: ()/(46-56) 105/51  SpO2:  [84 %-99 %] 95 %    Physical Exam  Vitals reviewed.   Constitutional:       General: He is not in acute distress.     Comments: He was laying in bed without any acute distress.   HENT:      Head: Normocephalic and atraumatic. No contusion.      Right Ear: External ear normal.      Left Ear: External ear normal.       Nose: Nose normal.      Comments: High flow nasal cannula     Mouth/Throat:      Pharynx: No oropharyngeal exudate.   Eyes:      General:         Right eye: No discharge.         Left eye: No discharge.      Pupils: Pupils are equal, round, and reactive to light.   Cardiovascular:      Rate and Rhythm: Normal rate and regular rhythm.      Heart sounds: No murmur heard.    No friction rub. No gallop.   Pulmonary:      Effort: Pulmonary effort is normal.      Breath sounds: No wheezing or rhonchi.   Abdominal:      General: Bowel sounds are normal. There is no distension.      Palpations: Abdomen is soft.      Tenderness: There is no abdominal tenderness. There is no rebound.   Musculoskeletal:         General: No swelling or tenderness. Normal range of motion.      Cervical back: No rigidity. No muscular tenderness.   Skin:     General: Skin is warm and dry.      Coloration: Skin is not jaundiced.   Neurological:      General: No focal deficit present.      Mental Status: He is alert and oriented to person, place, and time.      Cranial Nerves: No cranial nerve deficit.      Sensory: No sensory deficit.      Comments: Motor 5/5 in all 4 extremities   Psychiatric:         Mood and Affect: Mood normal.         Fluids    Intake/Output Summary (Last 24 hours) at 10/1/2022 0804  Last data filed at 10/1/2022 0601  Gross per 24 hour   Intake 1040 ml   Output 1100 ml   Net -60 ml         Laboratory  Recent Labs     09/29/22  0321 09/30/22  0340 10/01/22  0255   WBC 6.2 7.0 5.5   RBC 3.28* 3.25* 2.91*   HEMOGLOBIN 10.2* 10.1* 9.2*   HEMATOCRIT 29.9* 30.4* 27.3*   MCV 91.2 93.5 93.8   MCH 31.1 31.1 31.6   MCHC 34.1 33.2* 33.7   RDW 46.9 47.8 49.0   PLATELETCT 93* 83* 57*   MPV 11.6 12.3 12.0       Recent Labs     09/29/22  0321 09/30/22  0340 10/01/22  0255   SODIUM 137 138 138   POTASSIUM 4.1 4.0 4.2   CHLORIDE 102 103 107   CO2 18* 17* 20   GLUCOSE 97 107* 106*   BUN 70* 75* 75*   CREATININE 1.67* 1.72* 1.46*   CALCIUM 8.5  8.5 8.4*                         Imaging  DX-CHEST-PORTABLE (1 VIEW)   Final Result      1.  Right-sided Greenfield-Frannie catheter tip projects in the distal right main pulmonary artery region. No pneumothorax.   2.  Mild interval worsening in hazy bilateral pulmonary opacities.         DX-CHEST-PORTABLE (1 VIEW)   Final Result      1.  Right-sided Greenfield-Frannie catheter tip projects in the distal right main pulmonary artery region. No pneumothorax.   2.  Probable slight improvement in hazy bilateral pulmonary opacities.      DX-CHEST-PORTABLE (1 VIEW)   Final Result      1.  Right-sided PICC line unchanged in position.      2.  Increased interstitial markings throughout the lung fields especially in the perihilar regions consistent with pulmonary edema and/or pneumonitis.      CT-TAVR CHEST-ABD-PELVIS W/WO POST PROCESS   Final Result      1.  Limited by lack of intravenous contrast      2.  Aortic valve Agatston score 4565 and the annulus measures approximately 450 sq mm      3.  Coronary arteries originate over a centimeter above the annulus      4.  Severe atherosclerotic change with marked narrowing of the iliofemoral runoff, right worse than left      5.  Multifocal groundglass greater than bronchial thickening is compatible with known Covid pneumonia. There is also evidence of lower lung zone scarring, possible interstitial lung disease and small pleural fluid      6.  Colonic diverticulosis, emphysema, left heart enlargement      DX-CHEST-PORTABLE (1 VIEW)   Final Result      Unchanged BILATERAL pneumonia      EC-ECHOCARDIOGRAM COMPLETE W/O CONT   Final Result      DX-CHEST-LIMITED (1 VIEW)   Final Result      1.  Multifocal pneumonia. Pulmonary edema could have a similar appearance.      US-RUQ   Final Result      Unremarkable RIGHT upper quadrant ultrasound             Assessment/Plan  * Acute respiratory failure with hypoxia (HCC)  Assessment & Plan  Respiratory failure in setting of covid -19 vs ? HF   Continue to   monitor oxygen saturation closely  Incentive spirometry  Continue  dexamethasone  This morning he is requiring 35 L of oxygen to maintain oxygen saturation.  I discussed plan of care with patient and his wife at the bedside and answered all of their questions.  I ordered chest x-ray.    Heart failure with reduced ejection fraction (HCC)  Assessment & Plan  Echo showed severely reduced ejection fraction   Cardiology is following him.      Advance care planning  Assessment & Plan  Discussed by prior treatment team.  had a prolonged discussion with the patient regarding goals of care, diagnoses, prognosis, and CODE STATUS. We discussed his   prognosis and comorbidities. I spent  16  minutes on advanced care planning in addition to the time spent managing the other medical problems.    He requested to remain full code      Elevated liver enzymes  Assessment & Plan  Hepatitis panel came back negative.  right upper quadrant ultrasound did not show any acute abnormalities.  Could be secondary to remdesivir.  Liver enzyme is trending down.  Continue to monitor.    Elevated brain natriuretic peptide (BNP) level  Assessment & Plan  Likely in setting of COVID-19 infection  Euvolemic on exam  Echo showed reduced ejection fraction.    CKD (chronic kidney disease)  Assessment & Plan  Renal function remained stable compared to his labs  Continue to monitor  Avoid nephrotoxin    Pancytopenia (HCC)  Assessment & Plan  In setting of immunotherapy  Today white blood cell count is within normal limits.  Continue to monitor    Hypokalemia  Assessment & Plan  Replace as needed.  Continue to monitor.      Low bicarbonate  Assessment & Plan  In setting of CKD  Continue to monitor.    COVID  Assessment & Plan    Activate sepsis protocol  COVID Isolation  He was placed on oxygen nasal cannula but he developed hypoxia again and he was placed on high flow nasal cannula.  Continue on Decadron last dose October 1, 2022  He completed course of  remdesivir.  Monitor oxygen saturation closely      NSTEMI (non-ST elevated myocardial infarction) (HCC)- (present on admission)  Assessment & Plan    Echo showing ef of 20% also severer Aortic stenosis  Cardiology evaluated him and recommended to reconsult for possible stress echo when he is more stable.  No symptoms of acute chest pain.      Essential hypertension, benign- (present on admission)  Assessment & Plan  Mild hypotension.  Continue to monitor.    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    last immunotherapy infusion to treat his cancer in [place] on 09/09/2022  Follow-up with outpatient          I discussed plan of care during multidisciplinary rounds regarding his current medical condition and plan of care.      VTE prophylaxis: heparin ppx    I have performed a physical exam and reviewed and updated ROS and Plan today (10/1/2022). In review of yesterday's note (9/30/2022), there are no changes except as documented above.

## 2022-10-02 ENCOUNTER — APPOINTMENT (OUTPATIENT)
Dept: RADIOLOGY | Facility: MEDICAL CENTER | Age: 72
DRG: 177 | End: 2022-10-02
Attending: INTERNAL MEDICINE
Payer: MEDICARE

## 2022-10-02 PROBLEM — R04.2 HEMOPTYSIS: Status: ACTIVE | Noted: 2022-10-02

## 2022-10-02 LAB
ALBUMIN SERPL BCP-MCNC: 2.7 G/DL (ref 3.2–4.9)
ALBUMIN/GLOB SERPL: 1.5 G/DL
ALP SERPL-CCNC: 453 U/L (ref 30–99)
ALT SERPL-CCNC: 67 U/L (ref 2–50)
ANION GAP SERPL CALC-SCNC: 11 MMOL/L (ref 7–16)
AST SERPL-CCNC: 22 U/L (ref 12–45)
B PARAP IS1001 DNA NPH QL NAA+NON-PROBE: NOT DETECTED
B PERT.PT PRMT NPH QL NAA+NON-PROBE: NOT DETECTED
BASOPHILS # BLD AUTO: 0 % (ref 0–1.8)
BASOPHILS # BLD: 0 K/UL (ref 0–0.12)
BILIRUB SERPL-MCNC: 0.6 MG/DL (ref 0.1–1.5)
BUN SERPL-MCNC: 68 MG/DL (ref 8–22)
C PNEUM DNA NPH QL NAA+NON-PROBE: NOT DETECTED
CALCIUM SERPL-MCNC: 8.4 MG/DL (ref 8.5–10.5)
CHLORIDE SERPL-SCNC: 106 MMOL/L (ref 96–112)
CO2 SERPL-SCNC: 21 MMOL/L (ref 20–33)
CREAT SERPL-MCNC: 1.4 MG/DL (ref 0.5–1.4)
EOSINOPHIL # BLD AUTO: 0.01 K/UL (ref 0–0.51)
EOSINOPHIL NFR BLD: 0.2 % (ref 0–6.9)
ERYTHROCYTE [DISTWIDTH] IN BLOOD BY AUTOMATED COUNT: 49.7 FL (ref 35.9–50)
FLUAV RNA NPH QL NAA+NON-PROBE: NOT DETECTED
FLUAV RNA SPEC QL NAA+PROBE: NEGATIVE
FLUBV RNA NPH QL NAA+NON-PROBE: NOT DETECTED
FLUBV RNA SPEC QL NAA+PROBE: NEGATIVE
GFR SERPLBLD CREATININE-BSD FMLA CKD-EPI: 53 ML/MIN/1.73 M 2
GLOBULIN SER CALC-MCNC: 1.8 G/DL (ref 1.9–3.5)
GLUCOSE SERPL-MCNC: 90 MG/DL (ref 65–99)
GRAM STN SPEC: NORMAL
HADV DNA NPH QL NAA+NON-PROBE: NOT DETECTED
HCOV 229E RNA NPH QL NAA+NON-PROBE: NOT DETECTED
HCOV HKU1 RNA NPH QL NAA+NON-PROBE: NOT DETECTED
HCOV NL63 RNA NPH QL NAA+NON-PROBE: NOT DETECTED
HCOV OC43 RNA NPH QL NAA+NON-PROBE: NOT DETECTED
HCT VFR BLD AUTO: 26.2 % (ref 42–52)
HGB BLD-MCNC: 8.6 G/DL (ref 14–18)
HMPV RNA NPH QL NAA+NON-PROBE: NOT DETECTED
HPIV1 RNA NPH QL NAA+NON-PROBE: NOT DETECTED
HPIV2 RNA NPH QL NAA+NON-PROBE: NOT DETECTED
HPIV3 RNA NPH QL NAA+NON-PROBE: NOT DETECTED
HPIV4 RNA NPH QL NAA+NON-PROBE: NOT DETECTED
IMM GRANULOCYTES # BLD AUTO: 0.02 K/UL (ref 0–0.11)
IMM GRANULOCYTES NFR BLD AUTO: 0.5 % (ref 0–0.9)
LDH SERPL L TO P-CCNC: 345 U/L (ref 107–266)
LYMPHOCYTES # BLD AUTO: 0.07 K/UL (ref 1–4.8)
LYMPHOCYTES NFR BLD: 1.7 % (ref 22–41)
M PNEUMO DNA NPH QL NAA+NON-PROBE: NOT DETECTED
MAGNESIUM SERPL-MCNC: 1.8 MG/DL (ref 1.5–2.5)
MCH RBC QN AUTO: 31.2 PG (ref 27–33)
MCHC RBC AUTO-ENTMCNC: 32.8 G/DL (ref 33.7–35.3)
MCV RBC AUTO: 94.9 FL (ref 81.4–97.8)
MONOCYTES # BLD AUTO: 0.11 K/UL (ref 0–0.85)
MONOCYTES NFR BLD AUTO: 2.7 % (ref 0–13.4)
NEUTROPHILS # BLD AUTO: 3.85 K/UL (ref 1.82–7.42)
NEUTROPHILS NFR BLD: 94.9 % (ref 44–72)
NRBC # BLD AUTO: 0 K/UL
NRBC BLD-RTO: 0 /100 WBC
PHOSPHATE SERPL-MCNC: 3.6 MG/DL (ref 2.5–4.5)
PLATELET # BLD AUTO: 48 K/UL (ref 164–446)
PMV BLD AUTO: 12 FL (ref 9–12.9)
POTASSIUM SERPL-SCNC: 4.2 MMOL/L (ref 3.6–5.5)
PROCALCITONIN SERPL-MCNC: 0.21 NG/ML
PROT SERPL-MCNC: 4.5 G/DL (ref 6–8.2)
RBC # BLD AUTO: 2.76 M/UL (ref 4.7–6.1)
RSV RNA NPH QL NAA+NON-PROBE: NOT DETECTED
RSV RNA SPEC QL NAA+PROBE: NEGATIVE
RV+EV RNA NPH QL NAA+NON-PROBE: NOT DETECTED
SARS-COV-2 RNA NPH QL NAA+NON-PROBE: DETECTED
SARS-COV-2 RNA RESP QL NAA+PROBE: DETECTED
SCCMEC + MECA PNL NOSE NAA+PROBE: NEGATIVE
SIGNIFICANT IND 70042: NORMAL
SITE SITE: NORMAL
SODIUM SERPL-SCNC: 138 MMOL/L (ref 135–145)
SOURCE SOURCE: NORMAL
SPECIMEN SOURCE: ABNORMAL
WBC # BLD AUTO: 4.1 K/UL (ref 4.8–10.8)

## 2022-10-02 PROCEDURE — 86694 HERPES SIMPLEX NES ANTBDY: CPT | Mod: 91

## 2022-10-02 PROCEDURE — A9270 NON-COVERED ITEM OR SERVICE: HCPCS | Performed by: INTERNAL MEDICINE

## 2022-10-02 PROCEDURE — 84145 PROCALCITONIN (PCT): CPT

## 2022-10-02 PROCEDURE — 86665 EPSTEIN-BARR CAPSID VCA: CPT | Mod: 91

## 2022-10-02 PROCEDURE — 86645 CMV ANTIBODY IGM: CPT

## 2022-10-02 PROCEDURE — 700102 HCHG RX REV CODE 250 W/ 637 OVERRIDE(OP): Performed by: INTERNAL MEDICINE

## 2022-10-02 PROCEDURE — 99233 SBSQ HOSP IP/OBS HIGH 50: CPT | Performed by: INTERNAL MEDICINE

## 2022-10-02 PROCEDURE — 71045 X-RAY EXAM CHEST 1 VIEW: CPT

## 2022-10-02 PROCEDURE — 87449 NOS EACH ORGANISM AG IA: CPT | Mod: 91

## 2022-10-02 PROCEDURE — 94640 AIRWAY INHALATION TREATMENT: CPT

## 2022-10-02 PROCEDURE — 86663 EPSTEIN-BARR ANTIBODY: CPT

## 2022-10-02 PROCEDURE — 86664 EPSTEIN-BARR NUCLEAR ANTIGEN: CPT

## 2022-10-02 PROCEDURE — 87205 SMEAR GRAM STAIN: CPT

## 2022-10-02 PROCEDURE — 0241U HCHG SARS-COV-2 COVID-19 NFCT DS RESP RNA 4 TRGT MIC: CPT

## 2022-10-02 PROCEDURE — 700105 HCHG RX REV CODE 258: Performed by: INTERNAL MEDICINE

## 2022-10-02 PROCEDURE — 84100 ASSAY OF PHOSPHORUS: CPT

## 2022-10-02 PROCEDURE — 87305 ASPERGILLUS AG IA: CPT

## 2022-10-02 PROCEDURE — 770000 HCHG ROOM/CARE - INTERMEDIATE ICU *

## 2022-10-02 PROCEDURE — 86644 CMV ANTIBODY: CPT

## 2022-10-02 PROCEDURE — 87486 CHLMYD PNEUM DNA AMP PROBE: CPT

## 2022-10-02 PROCEDURE — 85025 COMPLETE CBC W/AUTO DIFF WBC: CPT

## 2022-10-02 PROCEDURE — 87641 MR-STAPH DNA AMP PROBE: CPT

## 2022-10-02 PROCEDURE — A9270 NON-COVERED ITEM OR SERVICE: HCPCS | Performed by: STUDENT IN AN ORGANIZED HEALTH CARE EDUCATION/TRAINING PROGRAM

## 2022-10-02 PROCEDURE — 94760 N-INVAS EAR/PLS OXIMETRY 1: CPT

## 2022-10-02 PROCEDURE — C9803 HOPD COVID-19 SPEC COLLECT: HCPCS | Performed by: INTERNAL MEDICINE

## 2022-10-02 PROCEDURE — 87798 DETECT AGENT NOS DNA AMP: CPT

## 2022-10-02 PROCEDURE — 87581 M.PNEUMON DNA AMP PROBE: CPT

## 2022-10-02 PROCEDURE — 87899 AGENT NOS ASSAY W/OPTIC: CPT

## 2022-10-02 PROCEDURE — 83735 ASSAY OF MAGNESIUM: CPT

## 2022-10-02 PROCEDURE — 87633 RESP VIRUS 12-25 TARGETS: CPT

## 2022-10-02 PROCEDURE — 80053 COMPREHEN METABOLIC PANEL: CPT

## 2022-10-02 PROCEDURE — 86635 COCCIDIOIDES ANTIBODY: CPT | Mod: 91

## 2022-10-02 PROCEDURE — 700102 HCHG RX REV CODE 250 W/ 637 OVERRIDE(OP): Performed by: STUDENT IN AN ORGANIZED HEALTH CARE EDUCATION/TRAINING PROGRAM

## 2022-10-02 PROCEDURE — 700111 HCHG RX REV CODE 636 W/ 250 OVERRIDE (IP): Performed by: INTERNAL MEDICINE

## 2022-10-02 PROCEDURE — 83615 LACTATE (LD) (LDH) ENZYME: CPT

## 2022-10-02 PROCEDURE — 87102 FUNGUS ISOLATION CULTURE: CPT

## 2022-10-02 RX ORDER — FUROSEMIDE 10 MG/ML
40 INJECTION INTRAMUSCULAR; INTRAVENOUS
Status: DISCONTINUED | OUTPATIENT
Start: 2022-10-02 | End: 2022-10-06

## 2022-10-02 RX ORDER — LINEZOLID 600 MG/1
600 TABLET, FILM COATED ORAL EVERY 12 HOURS
Status: DISCONTINUED | OUTPATIENT
Start: 2022-10-02 | End: 2022-10-02

## 2022-10-02 RX ADMIN — HYDRALAZINE HYDROCHLORIDE 10 MG: 25 TABLET, FILM COATED ORAL at 05:39

## 2022-10-02 RX ADMIN — PIPERACILLIN AND TAZOBACTAM 3.38 G: 3; .375 INJECTION, POWDER, LYOPHILIZED, FOR SOLUTION INTRAVENOUS; PARENTERAL at 19:58

## 2022-10-02 RX ADMIN — HYDRALAZINE HYDROCHLORIDE 10 MG: 25 TABLET, FILM COATED ORAL at 13:54

## 2022-10-02 RX ADMIN — ACYCLOVIR 400 MG: 400 TABLET ORAL at 05:39

## 2022-10-02 RX ADMIN — HYDRALAZINE HYDROCHLORIDE 10 MG: 25 TABLET, FILM COATED ORAL at 21:43

## 2022-10-02 RX ADMIN — GUAIFENESIN SYRUP AND DEXTROMETHORPHAN 5 ML: 100; 10 SYRUP ORAL at 21:43

## 2022-10-02 RX ADMIN — LINEZOLID 600 MG: 600 TABLET, FILM COATED ORAL at 18:18

## 2022-10-02 RX ADMIN — FUROSEMIDE 40 MG: 10 INJECTION, SOLUTION INTRAMUSCULAR; INTRAVENOUS at 10:19

## 2022-10-02 RX ADMIN — ASPIRIN 81 MG: 81 TABLET, COATED ORAL at 05:40

## 2022-10-02 RX ADMIN — PIPERACILLIN AND TAZOBACTAM 3.38 G: 3; .375 INJECTION, POWDER, LYOPHILIZED, FOR SOLUTION INTRAVENOUS; PARENTERAL at 18:19

## 2022-10-02 RX ADMIN — GUAIFENESIN SYRUP AND DEXTROMETHORPHAN 5 ML: 100; 10 SYRUP ORAL at 06:24

## 2022-10-02 RX ADMIN — SENNOSIDES AND DOCUSATE SODIUM 2 TABLET: 50; 8.6 TABLET ORAL at 05:40

## 2022-10-02 ASSESSMENT — ENCOUNTER SYMPTOMS
NAUSEA: 0
DIARRHEA: 0
ABDOMINAL PAIN: 0
TINGLING: 0
PALPITATIONS: 0
DIZZINESS: 0
EYE PAIN: 0
SENSORY CHANGE: 0
NECK PAIN: 0
HALLUCINATIONS: 0
SPUTUM PRODUCTION: 1
WEIGHT LOSS: 0
BLURRED VISION: 0
CONSTIPATION: 0
HEADACHES: 0
FEVER: 0
DOUBLE VISION: 0
FOCAL WEAKNESS: 0
ORTHOPNEA: 0
VOMITING: 0
MYALGIAS: 0
SPEECH CHANGE: 0
COUGH: 1
BACK PAIN: 0
TREMORS: 0
HEMOPTYSIS: 0
CHILLS: 0
PHOTOPHOBIA: 0
SHORTNESS OF BREATH: 1

## 2022-10-02 ASSESSMENT — PAIN DESCRIPTION - PAIN TYPE
TYPE: ACUTE PAIN
TYPE: ACUTE PAIN

## 2022-10-02 ASSESSMENT — LIFESTYLE VARIABLES: SUBSTANCE_ABUSE: 0

## 2022-10-02 NOTE — ASSESSMENT & PLAN NOTE
He found to have a pneumothorax on chest x-ray October 02, 2022.  Pulmonary and infectious disease consulted.  Continue to monitor closely.  Daily chest x-ray.

## 2022-10-02 NOTE — CARE PLAN
The patient is Watcher - Medium risk of patient condition declining or worsening    Shift Goals  Clinical Goals: Stable oxygenation, CXR, vitals  Patient Goals: Breath better  Family Goals: KARL    Progress made toward(s) clinical / shift goals:  Pt weaned from high flow down to 14L oxymask. No respiratory distress. CXR obtained twice during shift to evaluate pneumo. Small SQ air palpated at posterior base, neck on right side, stable, unchanged. Robitussin given for cough. Vitals stable, afebrile, denies pain.    Problem: Hemodynamics  Goal: Patient's hemodynamics, fluid balance and neurologic status will be stable or improve  Outcome: Progressing     Problem: Fall Risk  Goal: Patient will remain free from falls  Outcome: Progressing     Problem: Respiratory  Goal: Patient will achieve/maintain optimum respiratory ventilation and gas exchange  Outcome: Progressing     Problem: Urinary Elimination  Goal: Establish and maintain regular urinary output  Outcome: Progressing       Patient is not progressing towards the following goals:

## 2022-10-02 NOTE — PROGRESS NOTES
Pulmonary Progress Note    Date of admission  9/21/2022    Chief Complaint  72 y.o. male admitted 9/21/2022 with [facility]    Hospital Course    72-year-old male with a history of recent BRADLEY and COVID-19 infection July, again PCR positive in September, hypertension, aortic stenosis and follicular lymphoma on chemo/immunotherapy with Bendamustine and Obinutuzumab. He gets his oncologic care in [place] at [facility].  He was admitted on 9/21 with an NSTEMI and worsening shortness of breath with an echocardiogram showing ejection fraction of 20% with inferolateral hypokinesis.  He was admitted to the ICU, a PA catheter was placed and he was started on diuretics underwent evaluation for possible TAVR.  Also started on Decadron, doxycycline, and remdesivir for persistent vs reinfection with COVID. On Acyclovir for ppx. In the ICU was on high flow nasal cannula.      No reference to lung parenchymal disease on CT abd/pelvis at [facility] in 4/2022    CXR due to persistent hypoxia was performed 10/1 showed an incidental right apical pneumothorax with right-sided subcutaneous air extending into the neck as well as patchy bilateral alveolar opacities    CT of the chest 10/1 shows extensive pneumomediastinum with small right apical pneumothorax, persistent unchanged patchy bilateral groundglass opacities    Interval Problem Update  Reviewed last 24 hour events:  PTX stable on serial CXR  Hb 10 -> 8.6, plts 48  Nosebleeds have subsided  HFNC 30L/70%    Review of Systems  Review of Systems   Constitutional:  Positive for malaise/fatigue.   HENT:  Negative for nosebleeds.    Respiratory:  Positive for cough and sputum production. Negative for hemoptysis.    All other systems reviewed and are negative.     Vital Signs for last 24 hours   Temp:  [35.8 °C (96.4 °F)-35.9 °C (96.6 °F)] 35.9 °C (96.6 °F)  Pulse:  [] 88  Resp:  [16-34] 24  BP: ()/(45-59) 98/55  SpO2:  [83 %-98 %] 96 %    Physical Exam    Physical Exam  Vitals and nursing note reviewed.   Constitutional:       General: He is not in acute distress.     Appearance: He is well-developed. He is ill-appearing. He is not diaphoretic.   HENT:      Nose: Nose normal.      Mouth/Throat:      Pharynx: No oropharyngeal exudate.   Eyes:      General: No scleral icterus.        Right eye: No discharge.         Left eye: No discharge.      Conjunctiva/sclera: Conjunctivae normal.      Pupils: Pupils are equal, round, and reactive to light.   Neck:      Thyroid: No thyromegaly.      Vascular: No JVD.      Trachea: No tracheal deviation.   Cardiovascular:      Rate and Rhythm: Normal rate and regular rhythm.      Heart sounds: Normal heart sounds. No murmur heard.  Pulmonary:      Effort: Pulmonary effort is normal. No respiratory distress.      Breath sounds: Normal breath sounds. No stridor. No wheezing or rales.   Abdominal:      General: There is no distension.      Palpations: Abdomen is soft.      Tenderness: There is no abdominal tenderness. There is no guarding.   Musculoskeletal:         General: No tenderness. Normal range of motion.      Cervical back: Neck supple.   Lymphadenopathy:      Cervical: No cervical adenopathy.   Skin:     General: Skin is warm and dry.      Capillary Refill: Capillary refill takes less than 2 seconds.      Coloration: Skin is not pale.      Findings: No erythema.      Comments: RUE picc line site c/d/I  Palpable crepitus over right trapezius   Neurological:      Mental Status: He is alert and oriented to person, place, and time.      Sensory: No sensory deficit.      Motor: No abnormal muscle tone.      Coordination: Coordination normal.      Deep Tendon Reflexes: Reflexes normal.   Psychiatric:         Behavior: Behavior normal.         Thought Content: Thought content normal.         Judgment: Judgment normal.     Medications  Current Facility-Administered Medications   Medication Dose Route Frequency Provider Last Rate  Last Admin    furosemide (LASIX) injection 40 mg  40 mg Intravenous Q DAY Fransisco Hutchins M.D.   40 mg at 10/02/22 1019    piperacillin-tazobactam (Zosyn) 3.375 g in  mL IVPB  3.375 g Intravenous Once Fransisco Hutchins M.D.        And    piperacillin-tazobactam (Zosyn) 3.375 g in  mL IVPB  3.375 g Intravenous Q8HRS Fransisco Hutchins M.D.        linezolid (ZYVOX) tablet 600 mg  600 mg Oral Q12HRS Fransisco Hutchins M.D.        menthol ([place]alls) lozenge 1 Lozenge  1 Lozenge Oral Q2HRS PRN Fransisco Hutchins M.D.        guaiFENesin dextromethorphan (ROBITUSSIN DM) 100-10 MG/5ML syrup 5 mL  5 mL Oral Q6HRS PRN Fransisco Hutchins M.D.   5 mL at 10/02/22 0624    lidocaine 2 % nebulizer solution 5 mL  5 mL Nebulization Q6HRS PRN (RT) Fransisco Hutchins M.D.        hydrALAZINE (APRESOLINE) tablet 10 mg  10 mg Oral Q8HRS Marcos Ball M.D.   10 mg at 10/02/22 1354    acyclovir (Zovirax) tablet 400 mg  400 mg Oral DAILY Sally Mondragon M.D.   400 mg at 10/02/22 0539    senna-docusate (PERICOLACE or SENOKOT S) 8.6-50 MG per tablet 2 Tablet  2 Tablet Oral BID Sally Mondragon M.D.   2 Tablet at 10/02/22 0540    And    polyethylene glycol/lytes (MIRALAX) PACKET 1 Packet  1 Packet Oral QDAY PRN Sally Mondragon M.D.        And    magnesium hydroxide (MILK OF MAGNESIA) suspension 30 mL  30 mL Oral QDAY PRN Sally Mondragon M.D.        And    bisacodyl (DULCOLAX) suppository 10 mg  10 mg Rectal QDAY PRN Sally Mondragon M.D.         Fluids    Intake/Output Summary (Last 24 hours) at 10/2/2022 1706  Last data filed at 10/2/2022 0600  Gross per 24 hour   Intake 120 ml   Output 650 ml   Net -530 ml     Laboratory          Recent Labs     09/30/22  0340 10/01/22  0255 10/02/22  0540   SODIUM 138 138 138   POTASSIUM 4.0 4.2 4.2   CHLORIDE 103 107 106   CO2 17* 20 21   BUN 75* 75* 68*   CREATININE 1.72* 1.46* 1.40   MAGNESIUM 2.0 1.8 1.8   PHOSPHORUS 4.2 3.7 3.6   CALCIUM 8.5 8.4* 8.4*      Recent Labs     09/30/22  0340 10/01/22  0255 10/02/22  0540   ALTSGPT 149* 98* 67*   ASTSGOT 87* 37 22   ALKPHOSPHAT 728* 554* 453*   TBILIRUBIN 0.8 0.7 0.6   GLUCOSE 107* 106* 90     Recent Labs     09/30/22  0340 10/01/22  0255 10/02/22  0540   WBC 7.0 5.5 4.1*   NEUTSPOLYS 93.50* 95.70* 94.90*   LYMPHOCYTES 2.60* 1.10* 1.70*   MONOCYTES 2.70 2.50 2.70   EOSINOPHILS 0.10 0.00 0.20   BASOPHILS 0.10 0.20 0.00   ASTSGOT 87* 37 22   ALTSGPT 149* 98* 67*   ALKPHOSPHAT 728* 554* 453*   TBILIRUBIN 0.8 0.7 0.6     Recent Labs     09/30/22  0340 10/01/22  0255 10/02/22  0540   RBC 3.25* 2.91* 2.76*   HEMOGLOBIN 10.1* 9.2* 8.6*   HEMATOCRIT 30.4* 27.3* 26.2*   PLATELETCT 83* 57* 48*     Imaging  X-Ray:  I have personally reviewed the images and compared with prior images.  CT:    Reviewed  XR unchanged    Assessment/Plan    * Acute respiratory failure with hypoxia (HCC)  Assessment & Plan  CT showing persistent bilateral patchy ground glass opacities with basilar consolidations w/ bronchiectasis  Has been attributed to persistent vs recurrent COVID with decadron, remdesivir  Also has received empiric abx with doxycycline  COVID pneumonitis vs opportunistic PNA vs much less likely drug induced pneumonitis from bendamustine or obinutuzumab (< 1%)   Completed steroids/remedsivir for COVID- has not improved. Too ill for bronchoscopy  ID consulted: sputum cultures, PCP DFA, LDH, 13BDG, EBV and CMV PCR, legionella, strep UAG, MRSA nares, procal, extended resp path panel nasal PCR  Start linezolid/zosyn  Restart lasix 40mg IV QD, closely monitoring renal function and blood pressure  Consider empiric steroids if infectious workup unrevealing    Hemoptysis  Assessment & Plan  Recurrent nosebleeds exacerbated by HFNC and thrombocytopenia  Hb downtrending  Transition to oxymask  DC heparin, ASA  If persists consider ENT consult for nasal packing      Secondary spontaneous pneumothorax  Assessment & Plan  Pneumothorax and  subcutaneous emphysema  Suspect due to fits of coughing  Minimal pleural air- clinically stable no resp distress  Stable on serial imaging  Manage conservatively, aggressive cough suppression  Daily CXR     VTE:  Contraindicated    I have performed a physical exam and reviewed and updated ROS and Plan today (10/2/2022). In review of yesterday's note (10/1/2022), there are no changes except as documented above.     Discussed patient condition and risk of morbidity and/or mortality with Hospitalist, RN, RT, and Patient    This note was generated using voice recognition software which has a chance of producing errors of grammar and content.  I have made every reasonable attempt to find and correct any errors, but it should be expected that some may not be found prior to finalization of this note.  __________  Fransisco Hutchins MD  Pulmonary and Critical Care Medicine  [facility]

## 2022-10-02 NOTE — PROGRESS NOTES
Hospital Medicine Daily Progress Note    Date of Service  10/2/2022    Chief Complaint  Ceferino Fitch is a 72 y.o. male admitted 9/21/2022 with elevated troponin     Hospital Course  This is a a 72 y.o. 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    Patient initially presented to OSH and was transferred here for high level of care.  Cardiology has been consulted and patient admitted for further work up and treatment.  Due to high oxygen requirement patient transferred to ICU on September 25, 2022.  Cardiology consulted and he underwent right heart catheterization on September 25, 2022.  Cardiology and critical care has been following him.  He transferred to [facility] level of care on September 27, 2022.  His oxygen requirement trending down and on September 30, 2022 his oxygen requirement trended down to 7 L.  Plan is to transfer him to telemetry.  Cardiology evaluated him and recommended when he is stable consider reconsultation from cardiology and stress echo.    Interval Problem Update    10/02/22    I evaluated and examined him at the bedside.   Overnight and this morning he is on oxygen mask 15 L of oxygen.  Later this morning he developed hypoxia on 15 L of oxygen and he was placed on high flow nasal cannula again.  Repeat chest x-ray this morning showed no visible pneumothorax.  This could be obscured by the patient's pneumomediastinum and soft tissue gas.  Unchanged bilateral pulmonary opacities per  I discussed chest x-ray finding with patient.  Pulmonology consulted and evaluated him and made recommendations.  Continue titrate down oxygen as tolerated.  I discussed plan of care with bedside RN.      I have discussed this patient's plan of care and discharge plan at IDT rounds today with Case Management, Nursing,  Nursing leadership, and other members of the IDT team.    Consultants/Specialty  cardiology  Critical care   Code Status  Full Code    Disposition  Patient is not medically cleared for discharge.   Anticipate discharge to to home with close outpatient follow-up.  I have placed the appropriate orders for post-discharge needs.    Review of Systems  Review of Systems   Constitutional:  Negative for chills, fever and weight loss.   HENT:  Positive for nosebleeds. Negative for hearing loss and tinnitus.    Eyes:  Negative for blurred vision, double vision, photophobia and pain.   Respiratory:  Positive for cough (Improving), sputum production and shortness of breath (Improving).    Cardiovascular:  Negative for chest pain, palpitations, orthopnea and leg swelling.   Gastrointestinal:  Negative for abdominal pain, constipation, diarrhea, nausea and vomiting.   Genitourinary:  Negative for dysuria, frequency and urgency.   Musculoskeletal:  Negative for back pain, joint pain, myalgias and neck pain.   Skin:  Negative for rash.   Neurological:  Negative for dizziness, tingling, tremors, sensory change, speech change, focal weakness and headaches.        Loss of taste sensation   Psychiatric/Behavioral:  Negative for hallucinations and substance abuse.    All other systems reviewed and are negative.     Physical Exam  Temp:  [35.8 °C (96.4 °F)-35.8 °C (96.5 °F)] 35.8 °C (96.4 °F)  Pulse:  [] 78  Resp:  [15-34] 18  BP: ()/(45-59) 103/55  SpO2:  [83 %-99 %] 96 %    Physical Exam  Vitals reviewed.   Constitutional:       General: He is not in acute distress.     Comments: He was laying in bed without any acute distress.   HENT:      Head: Normocephalic and atraumatic. No contusion.      Right Ear: External ear normal.      Left Ear: External ear normal.      Nose: Nose normal.      Comments: High flow nasal cannula     Mouth/Throat:      Pharynx: No oropharyngeal exudate.   Eyes:      General:         Right eye: No  discharge.         Left eye: No discharge.      Pupils: Pupils are equal, round, and reactive to light.   Cardiovascular:      Rate and Rhythm: Normal rate and regular rhythm.      Heart sounds: No murmur heard.    No friction rub. No gallop.   Pulmonary:      Effort: Pulmonary effort is normal.      Breath sounds: No wheezing or rhonchi.      Comments: Decreased breath sounds  Abdominal:      General: Bowel sounds are normal. There is no distension.      Palpations: Abdomen is soft.      Tenderness: There is no abdominal tenderness. There is no rebound.   Musculoskeletal:         General: No swelling or tenderness. Normal range of motion.      Cervical back: No rigidity. No muscular tenderness.   Skin:     General: Skin is warm and dry.      Coloration: Skin is not jaundiced.   Neurological:      General: No focal deficit present.      Mental Status: He is alert and oriented to person, place, and time.      Cranial Nerves: No cranial nerve deficit.      Sensory: No sensory deficit.      Comments: Motor 5/5 in all 4 extremities   Psychiatric:         Mood and Affect: Mood normal.         Fluids    Intake/Output Summary (Last 24 hours) at 10/2/2022 0749  Last data filed at 10/2/2022 0600  Gross per 24 hour   Intake 820 ml   Output 1250 ml   Net -430 ml         Laboratory  Recent Labs     09/30/22  0340 10/01/22  0255 10/02/22  0540   WBC 7.0 5.5 4.1*   RBC 3.25* 2.91* 2.76*   HEMOGLOBIN 10.1* 9.2* 8.6*   HEMATOCRIT 30.4* 27.3* 26.2*   MCV 93.5 93.8 94.9   MCH 31.1 31.6 31.2   MCHC 33.2* 33.7 32.8*   RDW 47.8 49.0 49.7   PLATELETCT 83* 57* 48*   MPV 12.3 12.0 12.0       Recent Labs     09/30/22  0340 10/01/22  0255 10/02/22  0540   SODIUM 138 138 138   POTASSIUM 4.0 4.2 4.2   CHLORIDE 103 107 106   CO2 17* 20 21   GLUCOSE 107* 106* 90   BUN 75* 75* 68*   CREATININE 1.72* 1.46* 1.40   CALCIUM 8.5 8.4* 8.4*                         Imaging  DX-CHEST-PORTABLE (1 VIEW)   Final Result      1.  No visible pneumothorax. This  could be obscured by the patient's pneumomediastinum and soft tissue gas.   2.  Unchanged BILATERAL pulmonary opacities      DX-CHEST-PORTABLE (1 VIEW)   Final Result      1.  No visible pneumothorax. This could be obscured by the patient's pneumomediastinum and soft tissue gas.   2.  Unchanged BILATERAL pulmonary opacities      CT-CHEST (THORAX) W/O   Final Result      1.  Extensive pneumomediastinum with air within the soft tissues of the right neck and chest.   2.  Small right pneumothorax.   3.  Patchy extensive bilateral groundglass opacities with more dependent consolidative opacities are nonspecific, Covid Pneumonia versus other infectious/inflammatory etiology   4.  Sclerotic lesions in the right T11 and T12 levels in keeping with sclerotic osseous metastases   These findings were discussed with PROSPER BOWMAN III on 10/1/2022 4:49 PM.      Fleischner Society pulmonary nodule recommendations:   Not Applicable         DX-CHEST-PORTABLE (1 VIEW)   Final Result      1.  There is a new small right apical pneumothorax with new right-sided subcutaneous air extending into the neck. Question of a small amount of mediastinal air.   2.  Interval increase in patchy lower lobe opacities which could be atelectasis or pneumonitis or edema.      3.  Findings were discussed with Eleno, the patient's nurse at 2:58 PM on 10/1/2022 on 10/1/2022 2:58 PM. He states he will give this message to the physician taking care of the patient.      DX-CHEST-PORTABLE (1 VIEW)   Final Result      1.  Right-sided Hawks-Frannie catheter tip projects in the distal right main pulmonary artery region. No pneumothorax.   2.  Mild interval worsening in hazy bilateral pulmonary opacities.         DX-CHEST-PORTABLE (1 VIEW)   Final Result      1.  Right-sided Hawks-Frannie catheter tip projects in the distal right main pulmonary artery region. No pneumothorax.   2.  Probable slight improvement in hazy bilateral pulmonary opacities.      DX-CHEST-PORTABLE  (1 VIEW)   Final Result      1.  Right-sided PICC line unchanged in position.      2.  Increased interstitial markings throughout the lung fields especially in the perihilar regions consistent with pulmonary edema and/or pneumonitis.      CT-TAVR CHEST-ABD-PELVIS W/WO POST PROCESS   Final Result      1.  Limited by lack of intravenous contrast      2.  Aortic valve Agatston score 4565 and the annulus measures approximately 450 sq mm      3.  Coronary arteries originate over a centimeter above the annulus      4.  Severe atherosclerotic change with marked narrowing of the iliofemoral runoff, right worse than left      5.  Multifocal groundglass greater than bronchial thickening is compatible with known Covid pneumonia. There is also evidence of lower lung zone scarring, possible interstitial lung disease and small pleural fluid      6.  Colonic diverticulosis, emphysema, left heart enlargement      DX-CHEST-PORTABLE (1 VIEW)   Final Result      Unchanged BILATERAL pneumonia      EC-ECHOCARDIOGRAM COMPLETE W/O CONT   Final Result      DX-CHEST-LIMITED (1 VIEW)   Final Result      1.  Multifocal pneumonia. Pulmonary edema could have a similar appearance.      US-RUQ   Final Result      Unremarkable RIGHT upper quadrant ultrasound             Assessment/Plan  * Acute respiratory failure with hypoxia (HCC)  Assessment & Plan  Respiratory failure in setting of covid -19 vs ? HF   Continue to  monitor oxygen saturation closely  Incentive spirometry  Continue  dexamethasone  Overnight and this morning he is on oxygen mask 15 L of oxygen.  Later this morning he developed hypoxia on 15 L of oxygen and he was placed on high flow nasal cannula again.  I discussed plan of care with patient and his wife at the bedside and answered all of their questions.  Chest x-ray for tomorrow morning.    Secondary spontaneous pneumothorax  Assessment & Plan  He found to have a pneumothorax on chest x-ray October 02, 2022.  Requested consult  with pulmonology.  This morning he underwent chest x-ray and it did not show pneumothorax.  Overnight and this morning he is on oxygen mask 15 L of oxygen.  Later this morning he developed hypoxia on 15 L of oxygen and he was placed on high flow nasal cannula again.  Continue to monitor closely.  Daily chest x-ray.    Heart failure with reduced ejection fraction (HCC)  Assessment & Plan  Echo showed severely reduced ejection fraction   Cardiology is following him.      Advance care planning  Assessment & Plan  Discussed by prior treatment team.  had a prolonged discussion with the patient regarding goals of care, diagnoses, prognosis, and CODE STATUS. We discussed his   prognosis and comorbidities. I spent  16  minutes on advanced care planning in addition to the time spent managing the other medical problems.    He requested to remain full code      Elevated liver enzymes  Assessment & Plan  Hepatitis panel came back negative.  right upper quadrant ultrasound did not show any acute abnormalities.  Could be secondary to remdesivir.  Liver enzyme is trending down.  Continue to monitor.    Elevated brain natriuretic peptide (BNP) level  Assessment & Plan  Likely in setting of COVID-19 infection  Euvolemic on exam  Echo showed reduced ejection fraction.    CKD (chronic kidney disease)  Assessment & Plan  Renal function remained stable compared to his labs  Continue to monitor  Avoid nephrotoxin    Pancytopenia (HCC)  Assessment & Plan  In setting of immunotherapy  White blood cell count has been fluctuating.  Continue to monitor    Hypokalemia  Assessment & Plan  Replace as needed.  Continue to monitor.      Low bicarbonate  Assessment & Plan  In setting of CKD  Continue to monitor.    COVID  Assessment & Plan    Activate sepsis protocol  COVID Isolation  He was placed on oxygen nasal cannula but he developed hypoxia again and he was placed on high flow nasal cannula.  Continue on Decadron last dose October 1, 2022  He  completed course of remdesivir.  Monitor oxygen saturation closely      NSTEMI (non-ST elevated myocardial infarction) (HCC)- (present on admission)  Assessment & Plan    Echo showing ef of 20% also severer Aortic stenosis  Cardiology evaluated him and recommended to reconsult for possible stress echo when he is more stable.  No symptoms of acute chest pain.      Essential hypertension, benign- (present on admission)  Assessment & Plan  Mild hypotension.  Continue to monitor.    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    last immunotherapy infusion to treat his cancer in [place] on 09/09/2022  Follow-up with outpatient          I discussed plan of care during multidisciplinary rounds regarding his current medical condition and plan of care.      VTE prophylaxis: heparin ppx    I have performed a physical exam and reviewed and updated ROS and Plan today (10/2/2022). In review of yesterday's note (10/1/2022), there are no changes except as documented above.

## 2022-10-02 NOTE — ASSESSMENT & PLAN NOTE
Pneumothorax and subcutaneous emphysema  Suspect due to fits of coughing  Stable on serial imaging    Repeat CT prior to DC to assess for resolution

## 2022-10-02 NOTE — ASSESSMENT & PLAN NOTE
Resolved   Due to HFNC, now on simple mask   Heparin and aspirin on hold  DVT prophylaxis restarted   Continue saline nasal spray and Afrin

## 2022-10-02 NOTE — ASSESSMENT & PLAN NOTE
Likely due to COVID and superimposed pneumonia -improving, now on 5 lpm face mask     CT showing persistent bilateral patchy ground glass opacities with basilar consolidations w/ bronchiectasis  Patient has systolic heart failure with an EF of 20%  Pneumomediastinum unlikely contributing to hypoxemia    Overall, patient is improving, he feels less short of breath and his oxygen requirements have come down, favor the diagnosis of bacterial superinfection    Continue zosyn, ID following, 7 days   Defer lasix management to cards and primary   Continue saline and Afrin nasal spray for nosebleed  Continue with incentive spirometry and chest physiotherapy  Okay to continue with cough suppression at this time as patient has pneumomediastinum

## 2022-10-02 NOTE — CONSULTS
"As per pulmonary note: \"72-year-old male with a history of recent BRADLEY and COVID-19 infection July, again PCR positive in September, hypertension, aortic stenosis and follicular lymphoma on chemo/immunotherapy with Bendamustine and Obinutuzumab. He gets his oncologic care in [place] at [facility].  He was admitted on 9/21 with an NSTEMI and worsening shortness of breath with an echocardiogram showing ejection fraction of 20% with inferolateral hypokinesis.  He was admitted to the ICU, a PA catheter was placed and he was started on diuretics underwent evaluation for possible TAVR.  Also started on Decadron, doxycycline, and remdesivir for persistent vs reinfection with COVID. On Acyclovir for ppx. In the ICU was on high flow nasal cannula.\"     Despite very aggressive diuresis, steroids, RDV and course of doxy he continues to require high flow oxygen, he has a cough productive of sputum and blood (in setting of epistaxis which may account for the blood) and has a CT chest with significant multilobular bilateral opacities.  CT findings also seen on prior imaging this admit but no recent dedicated CT chest to compare. CT chest with pneumomediastinum with air within the soft tissues of the right neck and chest. Small right pneumothorax. Patchy extensive bilateral opacities and consolidation in the bases. New CT with increased opacities in bases. He has been afebrile, no leukocytosis and PCT mildly elevated a week ago so possible this is non-infectious etiology or atypical organism.    --- Pt is immune compromised and CT findings are significant and more consolidations then expected for COVID. Reasonable to trial antibiotics and initiate a broad work-up. Discussed with pulm and unable to bronch as respiratory status too tenuous.   --- Start zosyn 4.5 g q8h and po lilnezolid 600 mg BID  --- Monitor sx and response to therapy   --- Obtain sputum cx prior to starting abx- bacterial/fungal/AFB  --- MRSA nares and " if negative and no MRSA on resp cx can DC linezolid   --- Resp vial panel   --- Legionella and strep pneumo urinary antigen   --- PCP resp screen   --- CMV quant   --- TB quant   --- Cocci ab   --- Procal for am to trend   --- Beta D Glucan  --- Aspergillosis galactomanan    ID will see pt in am with H&P    Destinee Simeon MD

## 2022-10-03 ENCOUNTER — APPOINTMENT (OUTPATIENT)
Dept: RADIOLOGY | Facility: MEDICAL CENTER | Age: 72
DRG: 177 | End: 2022-10-03
Attending: INTERNAL MEDICINE
Payer: MEDICARE

## 2022-10-03 LAB
ALBUMIN SERPL BCP-MCNC: 2.6 G/DL (ref 3.2–4.9)
ALBUMIN/GLOB SERPL: 1.2 G/DL
ALP SERPL-CCNC: 376 U/L (ref 30–99)
ALT SERPL-CCNC: 53 U/L (ref 2–50)
ANION GAP SERPL CALC-SCNC: 15 MMOL/L (ref 7–16)
AST SERPL-CCNC: 20 U/L (ref 12–45)
BASOPHILS # BLD AUTO: 0 % (ref 0–1.8)
BASOPHILS # BLD: 0 K/UL (ref 0–0.12)
BILIRUB SERPL-MCNC: 0.8 MG/DL (ref 0.1–1.5)
BUN SERPL-MCNC: 67 MG/DL (ref 8–22)
CALCIUM SERPL-MCNC: 8.3 MG/DL (ref 8.5–10.5)
CHLORIDE SERPL-SCNC: 104 MMOL/L (ref 96–112)
CO2 SERPL-SCNC: 20 MMOL/L (ref 20–33)
CREAT SERPL-MCNC: 1.82 MG/DL (ref 0.5–1.4)
EOSINOPHIL # BLD AUTO: 0.02 K/UL (ref 0–0.51)
EOSINOPHIL NFR BLD: 0.3 % (ref 0–6.9)
ERYTHROCYTE [DISTWIDTH] IN BLOOD BY AUTOMATED COUNT: 51.8 FL (ref 35.9–50)
FUNGUS SPEC FUNGUS STN: NORMAL
GFR SERPLBLD CREATININE-BSD FMLA CKD-EPI: 39 ML/MIN/1.73 M 2
GLOBULIN SER CALC-MCNC: 2.1 G/DL (ref 1.9–3.5)
GLUCOSE SERPL-MCNC: 95 MG/DL (ref 65–99)
GRAM STN SPEC: NORMAL
HCT VFR BLD AUTO: 27 % (ref 42–52)
HGB BLD-MCNC: 8.5 G/DL (ref 14–18)
IMM GRANULOCYTES # BLD AUTO: 0.05 K/UL (ref 0–0.11)
IMM GRANULOCYTES NFR BLD AUTO: 0.8 % (ref 0–0.9)
LYMPHOCYTES # BLD AUTO: 0.06 K/UL (ref 1–4.8)
LYMPHOCYTES NFR BLD: 1 % (ref 22–41)
MAGNESIUM SERPL-MCNC: 1.7 MG/DL (ref 1.5–2.5)
MCH RBC QN AUTO: 31.3 PG (ref 27–33)
MCHC RBC AUTO-ENTMCNC: 31.5 G/DL (ref 33.7–35.3)
MCV RBC AUTO: 99.3 FL (ref 81.4–97.8)
MONOCYTES # BLD AUTO: 0.15 K/UL (ref 0–0.85)
MONOCYTES NFR BLD AUTO: 2.5 % (ref 0–13.4)
NEUTROPHILS # BLD AUTO: 5.8 K/UL (ref 1.82–7.42)
NEUTROPHILS NFR BLD: 95.4 % (ref 44–72)
NRBC # BLD AUTO: 0 K/UL
NRBC BLD-RTO: 0 /100 WBC
NT-PROBNP SERPL IA-MCNC: 2246 PG/ML (ref 0–125)
PHOSPHATE SERPL-MCNC: 3.8 MG/DL (ref 2.5–4.5)
PLATELET # BLD AUTO: 56 K/UL (ref 164–446)
PMV BLD AUTO: 12 FL (ref 9–12.9)
POTASSIUM SERPL-SCNC: 3.6 MMOL/L (ref 3.6–5.5)
PROT SERPL-MCNC: 4.7 G/DL (ref 6–8.2)
RBC # BLD AUTO: 2.72 M/UL (ref 4.7–6.1)
SIGNIFICANT IND 70042: NORMAL
SIGNIFICANT IND 70042: NORMAL
SITE SITE: NORMAL
SITE SITE: NORMAL
SODIUM SERPL-SCNC: 139 MMOL/L (ref 135–145)
SOURCE SOURCE: NORMAL
SOURCE SOURCE: NORMAL
WBC # BLD AUTO: 6.1 K/UL (ref 4.8–10.8)

## 2022-10-03 PROCEDURE — 84100 ASSAY OF PHOSPHORUS: CPT

## 2022-10-03 PROCEDURE — 71045 X-RAY EXAM CHEST 1 VIEW: CPT

## 2022-10-03 PROCEDURE — 87015 SPECIMEN INFECT AGNT CONCNTJ: CPT

## 2022-10-03 PROCEDURE — 99233 SBSQ HOSP IP/OBS HIGH 50: CPT | Performed by: INTERNAL MEDICINE

## 2022-10-03 PROCEDURE — 700111 HCHG RX REV CODE 636 W/ 250 OVERRIDE (IP): Performed by: INTERNAL MEDICINE

## 2022-10-03 PROCEDURE — 83735 ASSAY OF MAGNESIUM: CPT

## 2022-10-03 PROCEDURE — 700102 HCHG RX REV CODE 250 W/ 637 OVERRIDE(OP): Performed by: INTERNAL MEDICINE

## 2022-10-03 PROCEDURE — A9270 NON-COVERED ITEM OR SERVICE: HCPCS | Performed by: INTERNAL MEDICINE

## 2022-10-03 PROCEDURE — 80053 COMPREHEN METABOLIC PANEL: CPT

## 2022-10-03 PROCEDURE — 700105 HCHG RX REV CODE 258: Performed by: INTERNAL MEDICINE

## 2022-10-03 PROCEDURE — 87556 M.TUBERCULO DNA AMP PROBE: CPT

## 2022-10-03 PROCEDURE — 99223 1ST HOSP IP/OBS HIGH 75: CPT | Performed by: INTERNAL MEDICINE

## 2022-10-03 PROCEDURE — 770000 HCHG ROOM/CARE - INTERMEDIATE ICU *

## 2022-10-03 PROCEDURE — 87070 CULTURE OTHR SPECIMN AEROBIC: CPT

## 2022-10-03 PROCEDURE — 85025 COMPLETE CBC W/AUTO DIFF WBC: CPT

## 2022-10-03 PROCEDURE — 700102 HCHG RX REV CODE 250 W/ 637 OVERRIDE(OP): Performed by: STUDENT IN AN ORGANIZED HEALTH CARE EDUCATION/TRAINING PROGRAM

## 2022-10-03 PROCEDURE — 87206 SMEAR FLUORESCENT/ACID STAI: CPT

## 2022-10-03 PROCEDURE — 83880 ASSAY OF NATRIURETIC PEPTIDE: CPT

## 2022-10-03 PROCEDURE — 87116 MYCOBACTERIA CULTURE: CPT

## 2022-10-03 PROCEDURE — 94760 N-INVAS EAR/PLS OXIMETRY 1: CPT

## 2022-10-03 PROCEDURE — 87205 SMEAR GRAM STAIN: CPT

## 2022-10-03 PROCEDURE — A9270 NON-COVERED ITEM OR SERVICE: HCPCS | Performed by: STUDENT IN AN ORGANIZED HEALTH CARE EDUCATION/TRAINING PROGRAM

## 2022-10-03 PROCEDURE — 94640 AIRWAY INHALATION TREATMENT: CPT

## 2022-10-03 RX ORDER — ENOXAPARIN SODIUM 100 MG/ML
40 INJECTION SUBCUTANEOUS DAILY
Status: DISCONTINUED | OUTPATIENT
Start: 2022-10-03 | End: 2022-10-07

## 2022-10-03 RX ORDER — ECHINACEA PURPUREA EXTRACT 125 MG
2 TABLET ORAL
Status: DISCONTINUED | OUTPATIENT
Start: 2022-10-03 | End: 2022-10-12 | Stop reason: HOSPADM

## 2022-10-03 RX ORDER — OXYMETAZOLINE HYDROCHLORIDE 0.05 G/100ML
2 SPRAY NASAL 2 TIMES DAILY
Status: COMPLETED | OUTPATIENT
Start: 2022-10-03 | End: 2022-10-06

## 2022-10-03 RX ADMIN — PIPERACILLIN AND TAZOBACTAM 3.38 G: 3; .375 INJECTION, POWDER, LYOPHILIZED, FOR SOLUTION INTRAVENOUS; PARENTERAL at 05:16

## 2022-10-03 RX ADMIN — HYDRALAZINE HYDROCHLORIDE 10 MG: 25 TABLET, FILM COATED ORAL at 05:13

## 2022-10-03 RX ADMIN — PIPERACILLIN SODIUM AND TAZOBACTAM SODIUM 3.38 G: 3; .375 INJECTION, POWDER, FOR SOLUTION INTRAVENOUS at 20:30

## 2022-10-03 RX ADMIN — GUAIFENESIN SYRUP AND DEXTROMETHORPHAN 5 ML: 100; 10 SYRUP ORAL at 14:30

## 2022-10-03 RX ADMIN — OXYMETAZOLINE HCL 2 SPRAY: 0.05 SPRAY NASAL at 17:47

## 2022-10-03 RX ADMIN — PIPERACILLIN SODIUM AND TAZOBACTAM SODIUM 3.38 G: 3; .375 INJECTION, POWDER, FOR SOLUTION INTRAVENOUS at 12:50

## 2022-10-03 RX ADMIN — FUROSEMIDE 40 MG: 10 INJECTION, SOLUTION INTRAMUSCULAR; INTRAVENOUS at 05:13

## 2022-10-03 RX ADMIN — ACYCLOVIR 400 MG: 400 TABLET ORAL at 05:13

## 2022-10-03 ASSESSMENT — ENCOUNTER SYMPTOMS
SPUTUM PRODUCTION: 0
CONSTIPATION: 0
ABDOMINAL PAIN: 0
SENSORY CHANGE: 0
VOMITING: 0
DIZZINESS: 0
SPEECH CHANGE: 0
ORTHOPNEA: 0
HEADACHES: 0
EYE PAIN: 0
MYALGIAS: 0
PALPITATIONS: 0
SPUTUM PRODUCTION: 1
BACK PAIN: 0
WEIGHT LOSS: 0
TREMORS: 0
HALLUCINATIONS: 0
COUGH: 1
NAUSEA: 0
FOCAL WEAKNESS: 0
PHOTOPHOBIA: 0
CHILLS: 0
TINGLING: 0
BLURRED VISION: 0
NECK PAIN: 0
PSYCHIATRIC NEGATIVE: 1
DIARRHEA: 0
SHORTNESS OF BREATH: 1
HEMOPTYSIS: 0
DOUBLE VISION: 0
FEVER: 0

## 2022-10-03 ASSESSMENT — PAIN DESCRIPTION - PAIN TYPE
TYPE: ACUTE PAIN

## 2022-10-03 ASSESSMENT — PATIENT HEALTH QUESTIONNAIRE - PHQ9
1. LITTLE INTEREST OR PLEASURE IN DOING THINGS: NOT AT ALL
SUM OF ALL RESPONSES TO PHQ9 QUESTIONS 1 AND 2: 0

## 2022-10-03 ASSESSMENT — LIFESTYLE VARIABLES: SUBSTANCE_ABUSE: 0

## 2022-10-03 NOTE — PROGRESS NOTES
"Pulmonary Progress Note    Date of admission  9/21/2022    Chief Complaint  72 y.o. male admitted 9/21/2022 with [facility]    Hospital Course    \" 72-year-old male with a history of recent BRADLEY and COVID-19 infection July, again PCR positive in September, hypertension, aortic stenosis and follicular lymphoma on chemo/immunotherapy with Bendamustine and Obinutuzumab. He gets his oncologic care in [place] at [facility].  He was admitted on 9/21 with an NSTEMI and worsening shortness of breath with an echocardiogram showing ejection fraction of 20% with inferolateral hypokinesis.  He was admitted to the ICU, a PA catheter was placed and he was started on diuretics underwent evaluation for possible TAVR.  Also started on Decadron, doxycycline, and remdesivir for persistent vs reinfection with COVID. On Acyclovir for ppx. In the ICU was on high flow nasal cannula.      No reference to lung parenchymal disease on CT abd/pelvis at [facility] in 4/2022    CXR due to persistent hypoxia was performed 10/1 showed an incidental right apical pneumothorax with right-sided subcutaneous air extending into the neck as well as patchy bilateral alveolar opacities    CT of the chest 10/1 shows extensive pneumomediastinum with small right apical pneumothorax, persistent unchanged patchy bilateral groundglass opacities\"   -From Dr Hutchins's note     10/3/22 -patient continues to be on high flow nasal cannula, up to 40 L at 80%    Interval Problem Update  Reviewed last 24 hour events:  PTX stable on CXR  HFNC 40L/80%  Nosebleeding slow but still persistent    Review of Systems  Review of Systems   Constitutional:  Positive for malaise/fatigue.   HENT:  Negative for nosebleeds.    Respiratory:  Positive for cough. Negative for hemoptysis and sputum production.    Psychiatric/Behavioral: Negative.     All other systems reviewed and are negative.     Vital Signs for last 24 hours   Temp:  [35.6 °C (96.1 °F)-36.1 °C (97 °F)] " 35.8 °C (96.5 °F)  Pulse:  [] 89  Resp:  [16-31] 29  BP: ()/(40-56) 90/55  SpO2:  [76 %-100 %] 99 %    Physical Exam   Physical Exam  Vitals and nursing note reviewed.   Constitutional:       General: He is not in acute distress.     Appearance: He is well-developed. He is ill-appearing. He is not diaphoretic.   HENT:      Nose: Nose normal.      Mouth/Throat:      Pharynx: No oropharyngeal exudate.   Eyes:      Conjunctiva/sclera: Conjunctivae normal.   Neck:      Thyroid: No thyromegaly.      Vascular: No JVD.      Trachea: No tracheal deviation.   Cardiovascular:      Rate and Rhythm: Normal rate and regular rhythm.   Pulmonary:      Effort: Pulmonary effort is normal. No respiratory distress.      Breath sounds: Normal breath sounds. No stridor. No wheezing or rales.   Abdominal:      Palpations: Abdomen is soft.      Tenderness: There is no abdominal tenderness. There is no guarding.   Musculoskeletal:         General: No tenderness. Normal range of motion.      Cervical back: Neck supple.   Skin:     General: Skin is warm.      Capillary Refill: Capillary refill takes less than 2 seconds.      Coloration: Skin is not pale.      Findings: No erythema.   Neurological:      Mental Status: He is alert and oriented to person, place, and time.      Motor: No abnormal muscle tone.   Psychiatric:         Behavior: Behavior normal.         Thought Content: Thought content normal.         Judgment: Judgment normal.     Medications  Current Facility-Administered Medications   Medication Dose Route Frequency Provider Last Rate Last Admin    piperacillin-tazobactam (Zosyn) 3.375 g in  mL IVPB  3.375 g Intravenous Q8HRS Destinee Simeon M.D. 25 mL/hr at 10/03/22 1250 3.375 g at 10/03/22 1250    sodium chloride (OCEAN) 0.65 % nasal spray 2 Spray  2 Spray Nasal Q2HRS PRN Marita Yee D.O.        oxymetazoline (AFRIN) 0.05 % nasal spray 2 Spray  2 Spray Nasal BID Marita Yee D.O.         [Held by provider] enoxaparin (Lovenox) inj 40 mg  40 mg Subcutaneous DAILY AT 1800 Abisai Valdez M.D.        furosemide (LASIX) injection 40 mg  40 mg Intravenous Q DAY Fransisco Hutchins M.D.   40 mg at 10/03/22 0513    menthol ([place]alls) lozenge 1 Lozenge  1 Lozenge Oral Q2HRS PRN Abisai Valdez M.D.        guaiFENesin dextromethorphan (ROBITUSSIN DM) 100-10 MG/5ML syrup 5 mL  5 mL Oral Q6HRS PRN Abisai Valdez M.D.   5 mL at 10/03/22 1430    lidocaine 2 % nebulizer solution 5 mL  5 mL Nebulization Q6HRS PRN (RT) Abisai Valdez M.D.        hydrALAZINE (APRESOLINE) tablet 10 mg  10 mg Oral Q8HRS Marcos Ball M.D.   10 mg at 10/03/22 0513    acyclovir (Zovirax) tablet 400 mg  400 mg Oral DAILY Abisai Valdez M.D.   400 mg at 10/03/22 0513    senna-docusate (PERICOLACE or SENOKOT S) 8.6-50 MG per tablet 2 Tablet  2 Tablet Oral BID Sally Mondragon M.D.   2 Tablet at 10/02/22 0540    And    polyethylene glycol/lytes (MIRALAX) PACKET 1 Packet  1 Packet Oral QDAY PRN Sally Mondragon M.D.        And    magnesium hydroxide (MILK OF MAGNESIA) suspension 30 mL  30 mL Oral QDAY PRN Sally Mondragon M.D.        And    bisacodyl (DULCOLAX) suppository 10 mg  10 mg Rectal QDAY PRN Sally Mondragon M.D.         Fluids    Intake/Output Summary (Last 24 hours) at 10/3/2022 1637  Last data filed at 10/3/2022 1400  Gross per 24 hour   Intake 1420 ml   Output 2510 ml   Net -1090 ml       Laboratory          Recent Labs     10/01/22  0255 10/02/22  0540 10/03/22  0218   SODIUM 138 138 139   POTASSIUM 4.2 4.2 3.6   CHLORIDE 107 106 104   CO2 20 21 20   BUN 75* 68* 67*   CREATININE 1.46* 1.40 1.82*   MAGNESIUM 1.8 1.8 1.7   PHOSPHORUS 3.7 3.6 3.8   CALCIUM 8.4* 8.4* 8.3*       Recent Labs     10/01/22  0255 10/02/22  0540 10/03/22  0218   ALTSGPT 98* 67* 53*   ASTSGOT 37 22 20   ALKPHOSPHAT 554* 453* 376*   TBILIRUBIN 0.7 0.6 0.8   GLUCOSE 106* 90 95       Recent Labs      10/01/22  0255 10/02/22  0540 10/03/22  0218   WBC 5.5 4.1* 6.1   NEUTSPOLYS 95.70* 94.90* 95.40*   LYMPHOCYTES 1.10* 1.70* 1.00*   MONOCYTES 2.50 2.70 2.50   EOSINOPHILS 0.00 0.20 0.30   BASOPHILS 0.20 0.00 0.00   ASTSGOT 37 22 20   ALTSGPT 98* 67* 53*   ALKPHOSPHAT 554* 453* 376*   TBILIRUBIN 0.7 0.6 0.8       Recent Labs     10/01/22  0255 10/02/22  0540 10/03/22  0218   RBC 2.91* 2.76* 2.72*   HEMOGLOBIN 9.2* 8.6* 8.5*   HEMATOCRIT 27.3* 26.2* 27.0*   PLATELETCT 57* 48* 56*       Imaging  X-Ray:  I have personally reviewed the images and compared with prior images.  CT:    Reviewed  XR unchanged from 10/2    Assessment/Plan    * Acute respiratory failure with hypoxia (HCC)  Assessment & Plan  CT showing persistent bilateral patchy ground glass opacities with basilar consolidations w/ bronchiectasis  Has been attributed to persistent vs recurrent COVID with decadron, remdesivir  Also has received empiric abx with doxycycline  Patient has systolic heart failure with an EF of 20%  Pneumomediastinum unlikely contributing to hypoxemia  COVID pneumonitis vs opportunistic PNA vs much less likely drug induced pneumonitis from bendamustine or obinutuzumab (< 1%) v. Lymphotic infiltrative process in the setting of lymphoma   Completed steroids/remedsivir for COVID- has not improved. Too ill for bronchoscopy  ID consulted: sputum cultures, PCP DFA, LDH, 13BDG, EBV and CMV PCR, legionella, strep UAG, MRSA nares, procal, extended resp path panel nasal PCR  Continue linezolid/zosyn, ID following  Continue lasix 40mg IV QD, closely monitoring renal function and blood pressure  Consider empiric steroids if infectious workup unrevealing, will reassess tomorrow  Start saline and Afrin nasal spray for nosebleed  Continue with incentive spirometry and chest physiotherapy  Okay to continue with cough suppression at this time as patient has pneumomediastinum    Hemoptysis  Assessment & Plan  Recurrent nosebleeds exacerbated by  HFNC and thrombocytopenia  Hb downtrending  Transition to oxymask  DC heparin, ASA  If persists consider ENT consult for nasal packing  Started saline nasal spray and Afrin today      Secondary spontaneous pneumothorax  Assessment & Plan  Pneumothorax and subcutaneous emphysema  Suspect due to fits of coughing  Minimal pleural air- clinically stable no resp distress  Stable on serial imaging  Manage conservatively, aggressive cough suppression  Daily CXR       VTE:  Contraindicated due to nose bleeding     I have performed a physical exam and reviewed and updated ROS and Plan today (10/3/2022). In review of yesterday's note (10/2/2022), there are no changes except as documented above.     Discussed patient condition and risk of morbidity and/or mortality with Hospitalist, RN, RT, and Patient    Marita Yee,    Pulmonary and Critical Care

## 2022-10-03 NOTE — CONSULTS
INFECTIOUS DISEASES INPATIENT CONSULT NOTE     Date of Service:10/3/2022    Consult Requested By: Abisai Valdez M.D.    Reason for Consultation: COVID    History of Present Illness:   Ceferino Fitch is a 72 y.o. male with NHL on chemo admitted 9/21/2022 with hypoxemic resp failure.  He was first diagnosed with COVID 7/2022.  He is on chemo therapy ([place] and Obinutuzumab).  COVID again diagnosed PCR 9/13 and 9/21/2022  On admit diagnosed with NSTEMI.   ECHO 9/20 with EF 20% with inf & inferolateral hypokinesis.  Severe AS Cardiology has been evaluating along with [facility].    He was treated with decadron, doxycycline, lasix and ID previous eval added remdesevir on 9/24/2022.  All cultures and f/u viral studies have been otherwise negative.   Oxygenation has not improved much-currently on 40% HFNC  CT scan done 10/1 showed pneumomediastinum, PTX and ext groundglass opacities +mets  Infectious Diseases consulted for antibiotic recommendation and management  On Zosyn and Zyvox      Review Of Systems:  No fever  Dyspnea  All other systems are reviewed and are negative     PMH:   Past Medical History:   Diagnosis Date    Cancer (HCC)     Follicular lymphoma (HCC)     Hypertension          FAMILY HX:  No sick contacts    SOCIAL HX:  Social History     Socioeconomic History    Marital status:      Spouse name: Not on file    Number of children: Not on file    Years of education: Not on file    Highest education level: Not on file   Occupational History    Not on file   Tobacco Use    Smoking status: Former     Passive exposure: Past    Smokeless tobacco: Never   Vaping Use    Vaping Use: Not on file   Substance and Sexual Activity    Alcohol use: Not Currently     Alcohol/week: 3.0 oz     Types: 5 Cans of beer per week    Drug use: Never    Sexual activity: Not on file   Other Topics Concern    Not on file   Social History Narrative    Not on file     Social Determinants of Health     Financial Resource  Strain: Not on file   Food Insecurity: Not on file   Transportation Needs: Not on file   Physical Activity: Not on file   Stress: Not on file   Social Connections: Not on file   Intimate Partner Violence: Not on file   Housing Stability: Not on file     Social History     Tobacco Use   Smoking Status Former    Passive exposure: Past   Smokeless Tobacco Never     Social History     Substance and Sexual Activity   Alcohol Use Not Currently    Alcohol/week: 3.0 oz    Types: 5 Cans of beer per week       Allergies/Intolerances:  Allergies   Allergen Reactions    Penicillins Rash and Unspecified     Hoarseness           Other Current Medications:    Current Facility-Administered Medications:     [COMPLETED] piperacillin-tazobactam (Zosyn) 3.375 g in  mL IVPB, 3.375 g, Intravenous, Once, Stopped at 10/02/22 1849 **AND** piperacillin-tazobactam (Zosyn) 3.375 g in  mL IVPB, 3.375 g, Intravenous, Q8HRS, Destinee Simeon M.D.    furosemide (LASIX) injection 40 mg, 40 mg, Intravenous, Q DAY, Fransisco Hutchins M.D., 40 mg at 10/03/22 0513    menthol (Halls) lozenge 1 Lozenge, 1 Lozenge, Oral, Q2HRS PRN, Abisai Valdez M.D.    guaiFENesin dextromethorphan (ROBITUSSIN DM) 100-10 MG/5ML syrup 5 mL, 5 mL, Oral, Q6HRS PRN, Abisai Valdez M.D., 5 mL at 10/02/22 2143    lidocaine 2 % nebulizer solution 5 mL, 5 mL, Nebulization, Q6HRS PRN (RT), Abisai Valdez M.D.    hydrALAZINE (APRESOLINE) tablet 10 mg, 10 mg, Oral, Q8HRS, Marcos Ball M.D., 10 mg at 10/03/22 0513    acyclovir (Zovirax) tablet 400 mg, 400 mg, Oral, DAILY, Abisai Valdez M.D., 400 mg at 10/03/22 0513    senna-docusate (PERICOLACE or SENOKOT S) 8.6-50 MG per tablet 2 Tablet, 2 Tablet, Oral, BID, 2 Tablet at 10/02/22 0540 **AND** polyethylene glycol/lytes (MIRALAX) PACKET 1 Packet, 1 Packet, Oral, QDAY PRN **AND** magnesium hydroxide (MILK OF MAGNESIA) suspension 30 mL, 30 mL, Oral, QDAY PRN **AND** bisacodyl (DULCOLAX)  "suppository 10 mg, 10 mg, Rectal, QDAY PRN, Sally Mondragon M.D.      Most Recent Vital Signs:  BP (!) 93/54   Pulse 92   Temp (!) 35.6 °C (96.1 °F) (Temporal)   Resp (!) 21   Ht 1.8 m (5' 10.87\")   Wt 60.6 kg (133 lb 9.6 oz)   SpO2 100%   BMI 18.70 kg/m²   Temp  Av °C (96.8 °F)  Min: 35.6 °C (96 °F)  Max: 36.8 °C (98.2 °F)    Physical Exam:  Physical Exam  Vitals and nursing note reviewed.   Constitutional:       General: He is not in acute distress.     Appearance: He is ill-appearing. He is not toxic-appearing or diaphoretic.   HENT:      Nose: No rhinorrhea.   Eyes:      General: No scleral icterus.        Right eye: No discharge.         Left eye: No discharge.      Extraocular Movements: Extraocular movements intact.      Pupils: Pupils are equal, round, and reactive to light.   Cardiovascular:      Rate and Rhythm: Tachycardia present.   Pulmonary:      Effort: No respiratory distress.      Breath sounds: No stridor. No wheezing.      Comments: Mildly tachypneic  Abdominal:      General: There is no distension.      Palpations: Abdomen is soft.      Tenderness: There is no abdominal tenderness.   Musculoskeletal:      Cervical back: Neck supple. No rigidity.      Right lower leg: No edema.      Left lower leg: No edema.   Skin:     Coloration: Skin is pale. Skin is not jaundiced.      Findings: Bruising present.   Neurological:      General: No focal deficit present.      Mental Status: He is alert and oriented to person, place, and time.   Psychiatric:         Mood and Affect: Mood normal.         Behavior: Behavior normal.        Pertinent Lab Results:  Recent Labs     10/01/22  0255 10/02/22  0540 10/03/22  0218   WBC 5.5 4.1* 6.1      Recent Labs     10/01/22  0255 10/02/22  0540 10/03/22  0218   HEMOGLOBIN 9.2* 8.6* 8.5*   HEMATOCRIT 27.3* 26.2* 27.0*   MCV 93.8 94.9 99.3*   MCH 31.6 31.2 31.3   PLATELETCT 57* 48* 56*         Recent Labs     10/01/22  0255 10/02/22  0540 " 10/03/22  0218   SODIUM 138 138 139   POTASSIUM 4.2 4.2 3.6   CHLORIDE 107 106 104   CO2 20 21 20   CREATININE 1.46* 1.40 1.82*        Recent Labs     10/01/22  0255 10/02/22  0540 10/03/22  0218   ALBUMIN 2.4* 2.7* 2.6*        Pertinent Micro:  Results       Procedure Component Value Units Date/Time    AFB Culture [[id number]] Collected: 10/03/22 1036    Order Status: No result Specimen: Respirate Updated: 10/03/22 1220     Significant Indicator NEG     Source RESP     Site SPUTUM     Culture Result -     AFB Smear Results -    Narrative:      Collected By: [id number] DELLA MENENDEZ  Collected By: [id number] DELLA MENENDEZ    CULTURE RESPIRATORY W/ GRM STN [[id number]] Collected: 10/03/22 1036    Order Status: Sent Specimen: Respirate from Sputum Updated: 10/03/22 1219    Narrative:      Collected By: [id number] DELLA MENENDEZ    Fungal Culture [[id number]]  (Abnormal) Collected: 10/02/22 1740    Order Status: Completed Specimen: Respirate from Sputum Updated: 10/03/22 1104     Significant Indicator NEG     Source RESP     Site SPUTUM     Culture Result Culture in progress.     Gram Stain Result Few WBCs.  Many Gram positive cocci.  Sputum Gram stain quality score is  <1, probable  oropharyngeal contamination. Culture not performed.  Recollect if clinically indicated.       Fungal Smear Results -    Narrative:      Collected By: [id number] MARY PRO.  Collected By: [id number] MARY BERGMAN    MRSA By PCR (Amp) [[id number]] Collected: 10/02/22 1740    Order Status: Completed Specimen: Respirate from Nares Updated: 10/02/22 2005     MRSA by PCR Negative    Narrative:      Collected By: [id number] MARY BERGMAN    GRAM STAIN [[id number]] Collected: 10/02/22 1740    Order Status: Completed Specimen: Respirate Updated: 10/02/22 1936     Significant Indicator .     Source RESP     Site SPUTUM     Gram Stain Result Few WBCs.  Many Gram positive cocci.  Sputum Gram stain quality score is  <1, probable  oropharyngeal contamination. Culture  "not performed.  Recollect if clinically indicated.      Narrative:      Collected By: [id number] MARY PRO.  Collected By: [id number] MARY BERGMAN    Pneumocystis DFA [[id number]] Collected: 10/02/22 1841    Order Status: Sent Specimen: Respirate from Sputum     Respiratory Panel By PCR [[id number]]  (Abnormal) Collected: 10/02/22 1039    Order Status: Completed Specimen: Nasopharyngeal Updated: 10/02/22 1805     Adenovirus, PCR Not Detected     SARS-CoV-2 (COVID-19) RNA by ROHIT DETECTED     Comment: PATIENTS: Important information regarding your results and instructions can  be found at [email or web address]   \"After your  Covid-19 Test\"    **The [employer] Respiratory Panel 2.1 (RP2.1) RT-PCR test is FDA authorized.          Coronavirus 229E, PCR Not Detected     Coronavirus HKU1, PCR Not Detected     Coronavirus NL63, PCR Not Detected     Coronavirus OC43, PCR Not Detected     Human Metapneumovirus, PCR Not Detected     Rhino/Enterovirus, PCR Not Detected     Influenza A, PCR Not Detected     Influenza B, PCR Not Detected     Parainfluenza 1, PCR Not Detected     Parainfluenza 2, PCR Not Detected     Parainfluenza 3, PCR Not Detected     Parainfluenza 4, PCR Not Detected     RSV (Respiratory Syncytial Virus), PCR Not Detected     Bordetella parapertussis (IY9322), PCR Not Detected     Bordetella pertussis (ptxP), PCR Not Detected     Mycoplasma pneumoniae, PCR Not Detected     Chlamydia pneumoniae, PCR Not Detected    Narrative:      UTILIZATION ALERT: Has Flu/RSV/COVID PCR testing been  performed, resulted reviewed, and consulted with Infectious  Diseases or PICU Provider Teams?->Yes  Have you been in close contact with a person who is suspected  or known to be positive for COVID-19 within the last 30 days  (e.g. last seen that person < 30 days ago)->No    CULTURE RESPIRATORY W/ GRM STN [[id number]] Collected: 10/02/22 1740    Order Status: Canceled Specimen: Sputum     AFB Culture " "[[id number]] Collected: 10/02/22 1740    Order Status: Canceled Specimen: Sputum     MTB/RIF Resistance (Mycobacterium tuberculosis) [[id number]] Collected: 10/02/22 1740    Order Status: Canceled     COV-2, FLU A/B, AND RSV BY PCR (2-4 HOURS CEPHEID): Collect NP swab in VTM [[id number]]  (Abnormal) Collected: 10/02/22 1039    Order Status: Completed Specimen: Respirate from Nasopharyngeal Updated: 10/02/22 1312     Influenza virus A RNA Negative     Influenza virus B, PCR Negative     RSV, PCR Negative     SARS-CoV-2 by PCR DETECTED     Comment: PATIENTS: Important information regarding your results and instructions can  be found at [email or web address]   \"After your  Covid-19 Test\"    **The [employer] GeneXpert Xpress SARS-CoV-2 RT-PCR Test has been made  available for use under the Emergency Use Authorization (EUA) only.          SARS-CoV-2 Source NP Swab    Narrative:      Collected By: [id number] MARY BERGMAN    BLOOD CULTURE [[id number]] Collected: 09/22/22 0159    Order Status: Completed Specimen: Blood from Peripheral Updated: 09/27/22 0300     Significant Indicator NEG     Source BLD     Site PERIPHERAL     Culture Result No growth after 5 days of incubation.    Narrative:      Enhanced Droplet, Contact, and Eye Protection  Per Hospital Policy: Only change Specimen Src: to \"Line\" if  specified by physician order.  Left Hand    BLOOD CULTURE [[id number]] Collected: 09/22/22 0159    Order Status: Completed Specimen: Blood from Peripheral Updated: 09/27/22 0300     Significant Indicator NEG     Source BLD     Site PERIPHERAL     Culture Result No growth after 5 days of incubation.    Narrative:      Enhanced Droplet, Contact, and Eye Protection  Per Hospital Policy: Only change Specimen Src: to \"Line\" if  specified by physician order.  Left Hand          Blood Culture   Date Value Ref Range Status   09/21/2022 No growth after 5 days of incubation.  Final        Studies:  IMPRESSION:     1. "  Extensive pneumomediastinum with air within the soft tissues of the right neck and chest.  2.  Small right pneumothorax.  3.  Patchy extensive bilateral groundglass opacities with more dependent consolidative opacities are nonspecific, Covid Pneumonia versus other infectious/inflammatory etiology  4.  Sclerotic lesions in the right T11 and T12 levels in keeping with sclerotic osseous metastases  These findings were discussed with PROSPER BOWMAN III on 10/1/2022 4:49 PM.  IMPRESSION:   Extensive pneumomediastinum with air within the soft tissues of the right neck and chest.  Small right pneumothorax.  Patchy extensive bilateral groundglass opacities-infectious vs noninfectious  Sclerotic osseous metastases  Marked elevation BNP with known EF 20%  Not neutropenic  No fever  Thrombocytopenia    PLAN:   Antibiotics empirically expanded for HCAP   Check pneumocystitis as may be associated with PTX  Fungal serologies ordered but unlikely cause  Repeat BNP  Bronch if feasible for diagnosis      Plan of care discussed with MIGUEL ANGEL Valdez M.D.. Will continue to follow    Lucía Izquierdo M.D.

## 2022-10-03 NOTE — HEART FAILURE PROGRAM
Patient has been found to have an EF of 20% and covid infection.     He is currently admitted to the [facility] so this is not the time to review for GDMT.    Not actively using nicotine per h/p  Missing influenza vaccine for current season  No indication for hydralazine dinitrate per facesheet  No diagnosis of DM  No diagnosis of atrial arrhythmia    Nurses: Please document HF education in the education tab and populate the new and improved HF Care Plan. These tools will guide day to day care of the HF patient.    Providers: below are Guideline Directed Medical Therapy (GDMT) for HFrEF. If any cannot be prescribed by discharge, would you please note the clinical reason for each in your discharge summary? Thanks! Adelia    Evidence Based Beta Blocker (bisoprolol, carvedilol, or toprol xl), for EF of 40% or less  JESSICA - I, for EF of 40% or less ARNI is preferred If not cost prohibitive for patient  SGLT2 inhibitor with proven ASCVD, HF, or DKD benefit Jardiance/empagliflozin): If not cost prohibitive for patient  Aldosterone antagonist, for EF of 40% or less  Anticoagulation for atrial arrhythmia, if applicable  Glycemic control for DM + HF, if applicable  Lipid lowering medication for DM + HF, if applicable  Hydralazine Hydrochloride/Isosorbide Dinitrate. The combination of hydralazine and isosorbide- dinitrate is recommended to reduce morbidity and mortality for patients self-described  Americans with NYHA class III-IV HFrEF (EF 40% or less), receiving optimal therapy with ACE inhibitors and beta blockers, unless contraindicated (Class I, MENDY: A).  Pneumococcal vaccine, if not previously received  Influenza vaccine for current season, if not previously received  Smoking cessation counseling documented, if applicable  Device screening, if applicable  Referral to disease management program specializing in heart failure care- requested that schedulers notify me if patient cannot be scheduled at the Heart Failure  Clinic  Referral to Cardiac Rehab: Patient Criteria: Stable, chronic heart failure patients who can receive Medicare coverage are defined as patients with left ventricular ejection fraction of 35% or less and New York Heart Association (NYHA) Class II to IV symptoms on optimal heart failure therapy for at least six weeks.  7 calendar day f/u appointment scheduled prior to discharge, appearing on signed after visit summary.

## 2022-10-03 NOTE — PROGRESS NOTES
Received report from Maegan RODRÍGUEZ. Assumed care at 0700  Patient a & o x 4.   Pt desatting this am on oxymask. Nonrebreather placed by respiratory. Sputum culture sent.  Voiding via urinal  BM on 10/3/22. Loose, dark brownAbd soft, Bowel sounds active, pt denies nausea  Diet regular; poor appetite  Bruising generalized   SCDs refused  Unable to ambulate patient d/t O2 sats decreasing with movement.   Patient oriented to room, surroundings and call bell. Bed in lowest position, locked and upper side rails up. Patient demonstrated correct use of call bell. Call bell/belongings within reach. Patient educated on hourly rounding.   Plan   Wean o2

## 2022-10-03 NOTE — CARE PLAN
The patient is Watcher - Medium risk of patient condition declining or worsening    Shift Goals  Clinical Goals: Stable oxygenation, CXR, vitals  Patient Goals: Breath better  Family Goals: KARL    Progress made toward(s) clinical / shift goals: Pt on 13-15 L oxymask throughout the night until this AM where pt saturations dropped to 77% while on 15L oxymask when using his urinal. Placed on High flow by respiratory 40L and 80% FiO2. Robitussin given for cough. Sputum culture needs to be re-obtained as last one was denied by microbiology.   Continue on broad spectrum antibiotics. Await results of send out labs. Pt denies pain, NSR on tele, SBP 's.     Problem: Hemodynamics  Goal: Patient's hemodynamics, fluid balance and neurologic status will be stable or improve  Outcome: Progressing     Problem: Self Care  Goal: Patient will have the ability to perform ADLs independently or with assistance (bathe, groom, dress, toilet and feed)  Outcome: Progressing     Problem: Risk for Aspiration  Goal: Patient's risk for aspiration will be absent or decrease  Outcome: Progressing       Patient is not progressing towards the following goals:      Problem: Respiratory  Goal: Patient will achieve/maintain optimum respiratory ventilation and gas exchange  Outcome: Not Progressing

## 2022-10-03 NOTE — PROGRESS NOTES
Hospital Medicine Daily Progress Note    Date of Service  10/3/2022    Chief Complaint  Ceferino Fitch is a 72 y.o. male admitted 9/21/2022 with elevated troponin     Hospital Course  This is a a 72 y.o. 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    Patient initially presented to OSH and was transferred here for high level of care.  Cardiology has been consulted and patient admitted for further work up and treatment.  Due to high oxygen requirement patient transferred to ICU on September 25, 2022.  Cardiology consulted and he underwent right heart catheterization on September 25, 2022.  Cardiology and critical care has been following him.  He transferred to [facility] level of care on September 27, 2022.  His oxygen requirement trending down and on September 30, 2022 his oxygen requirement trended down to 7 L.  Plan is to transfer him to telemetry.  Cardiology evaluated him and recommended when he is stable consider reconsultation from cardiology and stress echo.    Interval Problem Update    10/03/22    I evaluated and examined him at the bedside.  He has been having nosebleed.  Plan is to hold DVT prophylaxis.  I explained patient and wife at the bedside regarding use of DVT prophylaxis but due to nosebleed plan is to hold it for next 24 hours.  Currently is on 40 L of oxygen to maintain oxygen saturation.  Infectious disease consulted by pulmonology.  Started him on Zyvox and Zosyn.  MRSA screening came back negative discontinue Zyvox and continue Zosyn.  Appreciate pulmonology and infectious disease recommendations  I discussed plan of care with patient and answered all his questions.      I have discussed this patient's plan of care and discharge plan at IDT rounds today with Case Management, Nursing, Nursing  leadership, and other members of the IDT team.    Consultants/Specialty  cardiology  Critical care   Code Status  Full Code    Disposition  Patient is not medically cleared for discharge.   Anticipate discharge to to home with close outpatient follow-up.  I have placed the appropriate orders for post-discharge needs.    Review of Systems  Review of Systems   Constitutional:  Negative for chills, fever and weight loss.   HENT:  Positive for nosebleeds. Negative for hearing loss and tinnitus.    Eyes:  Negative for blurred vision, double vision, photophobia and pain.   Respiratory:  Positive for cough (Improving), sputum production and shortness of breath (Improving).    Cardiovascular:  Negative for chest pain, palpitations, orthopnea and leg swelling.   Gastrointestinal:  Negative for abdominal pain, constipation, diarrhea, nausea and vomiting.   Genitourinary:  Negative for dysuria, frequency and urgency.   Musculoskeletal:  Negative for back pain, joint pain, myalgias and neck pain.   Skin:  Negative for rash.   Neurological:  Negative for dizziness, tingling, tremors, sensory change, speech change, focal weakness and headaches.        Loss of taste sensation   Psychiatric/Behavioral:  Negative for hallucinations and substance abuse.    All other systems reviewed and are negative.     Physical Exam  Temp:  [35.6 °C (96.1 °F)-36.1 °C (97 °F)] 35.6 °C (96.1 °F)  Pulse:  [] 85  Resp:  [16-31] 16  BP: ()/(40-59) 107/51  SpO2:  [76 %-99 %] 93 %    Physical Exam  Vitals reviewed.   Constitutional:       General: He is not in acute distress.     Comments: He was laying in bed without any acute distress.   HENT:      Head: Normocephalic and atraumatic. No contusion.      Right Ear: External ear normal.      Left Ear: External ear normal.      Nose: Nose normal.      Comments: High flow nasal cannula     Mouth/Throat:      Pharynx: No oropharyngeal exudate.   Eyes:      General:         Right eye: No discharge.          Left eye: No discharge.      Pupils: Pupils are equal, round, and reactive to light.   Cardiovascular:      Rate and Rhythm: Normal rate and regular rhythm.      Heart sounds: No murmur heard.    No friction rub. No gallop.   Pulmonary:      Effort: Pulmonary effort is normal.      Breath sounds: No wheezing or rhonchi.      Comments: Decreased breath sounds  Abdominal:      General: Bowel sounds are normal. There is no distension.      Palpations: Abdomen is soft.      Tenderness: There is no abdominal tenderness. There is no rebound.   Musculoskeletal:         General: No swelling or tenderness. Normal range of motion.      Cervical back: No rigidity. No muscular tenderness.   Skin:     General: Skin is warm and dry.      Coloration: Skin is not jaundiced.   Neurological:      General: No focal deficit present.      Mental Status: He is alert and oriented to person, place, and time.      Cranial Nerves: No cranial nerve deficit.      Sensory: No sensory deficit.      Comments: Motor 5/5 in all 4 extremities   Psychiatric:         Mood and Affect: Mood normal.         Fluids    Intake/Output Summary (Last 24 hours) at 10/3/2022 0748  Last data filed at 10/3/2022 0633  Gross per 24 hour   Intake 1200 ml   Output 1760 ml   Net -560 ml         Laboratory  Recent Labs     10/01/22  0255 10/02/22  0540 10/03/22  0218   WBC 5.5 4.1* 6.1   RBC 2.91* 2.76* 2.72*   HEMOGLOBIN 9.2* 8.6* 8.5*   HEMATOCRIT 27.3* 26.2* 27.0*   MCV 93.8 94.9 99.3*   MCH 31.6 31.2 31.3   MCHC 33.7 32.8* 31.5*   RDW 49.0 49.7 51.8*   PLATELETCT 57* 48* 56*   MPV 12.0 12.0 12.0       Recent Labs     10/01/22  0255 10/02/22  0540 10/03/22  0218   SODIUM 138 138 139   POTASSIUM 4.2 4.2 3.6   CHLORIDE 107 106 104   CO2 20 21 20   GLUCOSE 106* 90 95   BUN 75* 68* 67*   CREATININE 1.46* 1.40 1.82*   CALCIUM 8.4* 8.4* 8.3*                         Imaging  DX-CHEST-PORTABLE (1 VIEW)   Final Result         1.  Pulmonary edema and/or infiltrates are  identified, which are stable since the prior exam.   2.  Right chest wall and neck soft tissue gas.   3.  Atherosclerosis      DX-CHEST-PORTABLE (1 VIEW)   Final Result      1.  No visible pneumothorax. This could be obscured by the patient's pneumomediastinum and soft tissue gas.   2.  Unchanged BILATERAL pulmonary opacities      DX-CHEST-PORTABLE (1 VIEW)   Final Result      1.  No visible pneumothorax. This could be obscured by the patient's pneumomediastinum and soft tissue gas.   2.  Unchanged BILATERAL pulmonary opacities      CT-CHEST (THORAX) W/O   Final Result      1.  Extensive pneumomediastinum with air within the soft tissues of the right neck and chest.   2.  Small right pneumothorax.   3.  Patchy extensive bilateral groundglass opacities with more dependent consolidative opacities are nonspecific, Covid Pneumonia versus other infectious/inflammatory etiology   4.  Sclerotic lesions in the right T11 and T12 levels in keeping with sclerotic osseous metastases   These findings were discussed with PROSPER BOWMAN III on 10/1/2022 4:49 PM.      Fleischner Society pulmonary nodule recommendations:   Not Applicable         DX-CHEST-PORTABLE (1 VIEW)   Final Result      1.  There is a new small right apical pneumothorax with new right-sided subcutaneous air extending into the neck. Question of a small amount of mediastinal air.   2.  Interval increase in patchy lower lobe opacities which could be atelectasis or pneumonitis or edema.      3.  Findings were discussed with Eleno, the patient's nurse at 2:58 PM on 10/1/2022 on 10/1/2022 2:58 PM. He states he will give this message to the physician taking care of the patient.      DX-CHEST-PORTABLE (1 VIEW)   Final Result      1.  Right-sided [place]-Frannie catheter tip projects in the distal right main pulmonary artery region. No pneumothorax.   2.  Mild interval worsening in hazy bilateral pulmonary opacities.         DX-CHEST-PORTABLE (1 VIEW)   Final Result      1.  " Right-sided [place]-Frannie catheter tip projects in the distal right main pulmonary artery region. No pneumothorax.   2.  Probable slight improvement in hazy bilateral pulmonary opacities.      DX-CHEST-PORTABLE (1 VIEW)   Final Result      1.  Right-sided PICC line unchanged in position.      2.  Increased interstitial markings throughout the lung fields especially in the perihilar regions consistent with pulmonary edema and/or pneumonitis.      CT-TAVR CHEST-ABD-PELVIS W/WO POST PROCESS   Final Result      1.  Limited by lack of intravenous contrast      2.  Aortic valve Agatston score 4565 and the annulus measures approximately 450 sq mm      3.  Coronary arteries originate over a centimeter above the annulus      4.  Severe atherosclerotic change with marked narrowing of the iliofemoral runoff, right worse than left      5.  Multifocal groundglass greater than bronchial thickening is compatible with known Covid pneumonia. There is also evidence of lower lung zone scarring, possible interstitial lung disease and small pleural fluid      6.  Colonic diverticulosis, emphysema, left heart enlargement      DX-CHEST-PORTABLE (1 VIEW)   Final Result      Unchanged BILATERAL pneumonia      EC-ECHOCARDIOGRAM COMPLETE W/O CONT   Final Result      DX-CHEST-LIMITED (1 VIEW)   Final Result      1.  Multifocal pneumonia. Pulmonary edema could have a similar appearance.      US-RUQ   Final Result      Unremarkable RIGHT upper quadrant ultrasound             Assessment/Plan  * Acute respiratory failure with hypoxia (HCC)  Assessment & Plan  Respiratory failure in setting of covid -19 vs ? HF   Continue to  monitor oxygen saturation closely  Incentive spirometry  Currently he is on high flow oxygen to maintain oxygen saturation.  Pulmonary evaluated him and made recommendations per  X-ray today showed \"Pulmonary edema and/or infiltrates are identified, which are stable since the prior exam\".  Right neck and chest wall air.  Started " him on antibiotic Zyvox and Zosyn on October 02, 2022.  MRSA nares came back negative discontinue Zyvox and currently he is on Zosyn.  I discussed plan of care with patient and his wife at the bedside and answered all of their questions.  Chest x-ray for tomorrow morning.    Secondary spontaneous pneumothorax  Assessment & Plan  He found to have a pneumothorax on chest x-ray October 02, 2022.  Pulmonary and infectious disease consulted.  Continue to monitor closely.  Daily chest x-ray.    Heart failure with reduced ejection fraction (HCC)  Assessment & Plan  Echo showed severely reduced ejection fraction   Cardiology is following him.      Advance care planning  Assessment & Plan  Discussed by prior treatment team.  had a prolonged discussion with the patient regarding goals of care, diagnoses, prognosis, and CODE STATUS. We discussed his   prognosis and comorbidities. I spent  16  minutes on advanced care planning in addition to the time spent managing the other medical problems.    He requested to remain full code      Elevated liver enzymes  Assessment & Plan  Hepatitis panel came back negative.  right upper quadrant ultrasound did not show any acute abnormalities.  Could be secondary to remdesivir.  Liver enzyme is trending down.  Continue to monitor.    Elevated brain natriuretic peptide (BNP) level  Assessment & Plan  Likely in setting of COVID-19 infection  Euvolemic on exam  Echo showed reduced ejection fraction.    CKD (chronic kidney disease)  Assessment & Plan  Renal function has been fluctuating.  Increased BUN and creatinine this morning.  Continue to monitor  Avoid nephrotoxin    Pancytopenia (HCC)  Assessment & Plan  In setting of immunotherapy  White blood cell count has been fluctuating.  Continue to monitor    Hypokalemia  Assessment & Plan  Replace as needed.  Continue to monitor.      Low bicarbonate  Assessment & Plan  In setting of CKD  Continue to monitor.    COVID  Assessment &  Plan    Activate sepsis protocol  COVID Isolation  He was placed on oxygen nasal cannula but he developed hypoxia again and he was placed on high flow nasal cannula.  Completed Decadron last dose October 1, 2022  He completed course of remdesivir.  Repeat COVID testing ordered by pulmonologist came back positive.  Monitor oxygen saturation closely      NSTEMI (non-ST elevated myocardial infarction) (HCC)- (present on admission)  Assessment & Plan    Echo showing ef of 20% also severer Aortic stenosis  Cardiology evaluated him and recommended to reconsult for possible stress echo when he is more stable.  No symptoms of acute chest pain.      Essential hypertension, benign- (present on admission)  Assessment & Plan  Mild hypotension.  Continue to monitor.    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    last immunotherapy infusion to treat his cancer in [place] on 09/09/2022  Follow-up with outpatient          I discussed plan of care during multidisciplinary rounds regarding his current medical condition and plan of care.      VTE prophylaxis: heparin ppx    I have performed a physical exam and reviewed and updated ROS and Plan today (10/3/2022). In review of yesterday's note (10/2/2022), there are no changes except as documented above.

## 2022-10-04 ENCOUNTER — APPOINTMENT (OUTPATIENT)
Dept: RADIOLOGY | Facility: MEDICAL CENTER | Age: 72
DRG: 177 | End: 2022-10-04
Attending: INTERNAL MEDICINE
Payer: MEDICARE

## 2022-10-04 LAB
1 3 BETA D GLUCAN INTERP Q4483: NEGATIVE
1,3 BETA GLUCAN SER-MCNC: <31 PG/ML
ALBUMIN SERPL BCP-MCNC: 2.7 G/DL (ref 3.2–4.9)
ALBUMIN/GLOB SERPL: 1.4 G/DL
ALP SERPL-CCNC: 294 U/L (ref 30–99)
ALT SERPL-CCNC: 35 U/L (ref 2–50)
ANION GAP SERPL CALC-SCNC: 15 MMOL/L (ref 7–16)
AST SERPL-CCNC: 13 U/L (ref 12–45)
BASOPHILS # BLD AUTO: 0.2 % (ref 0–1.8)
BASOPHILS # BLD: 0.01 K/UL (ref 0–0.12)
BILIRUB SERPL-MCNC: 0.7 MG/DL (ref 0.1–1.5)
BUN SERPL-MCNC: 59 MG/DL (ref 8–22)
CALCIUM SERPL-MCNC: 8.3 MG/DL (ref 8.5–10.5)
CHLORIDE SERPL-SCNC: 105 MMOL/L (ref 96–112)
CMV IGG SERPL IA-ACNC: <0.2 U/ML
CMV IGM SERPL IA-ACNC: <8 AU/ML
CO2 SERPL-SCNC: 21 MMOL/L (ref 20–33)
CREAT SERPL-MCNC: 2.13 MG/DL (ref 0.5–1.4)
EBV EA-D IGG SER-ACNC: <5 U/ML (ref 0–10.9)
EBV NA IGG SER IA-ACNC: 103 U/ML (ref 0–21.9)
EBV VCA IGG SER IA-ACNC: >750 U/ML (ref 0–21.9)
EBV VCA IGM SER IA-ACNC: <10 U/ML (ref 0–43.9)
EOSINOPHIL # BLD AUTO: 0.03 K/UL (ref 0–0.51)
EOSINOPHIL NFR BLD: 0.7 % (ref 0–6.9)
ERYTHROCYTE [DISTWIDTH] IN BLOOD BY AUTOMATED COUNT: 48.5 FL (ref 35.9–50)
GALACTOMANNAN AG SERPL QL IA: NEGATIVE
GALACTOMANNAN AG SERPL-ACNC: 0.05
GFR SERPLBLD CREATININE-BSD FMLA CKD-EPI: 32 ML/MIN/1.73 M 2
GLOBULIN SER CALC-MCNC: 2 G/DL (ref 1.9–3.5)
GLUCOSE SERPL-MCNC: 85 MG/DL (ref 65–99)
HCT VFR BLD AUTO: 23.1 % (ref 42–52)
HGB BLD-MCNC: 7.7 G/DL (ref 14–18)
HSV1+2 IGG SER IA-ACNC: 0.11 IV
HSV1+2 IGM SER IA-ACNC: 0.18 IV
IMM GRANULOCYTES # BLD AUTO: 0.02 K/UL (ref 0–0.11)
IMM GRANULOCYTES NFR BLD AUTO: 0.5 % (ref 0–0.9)
L PNEUMO AG UR QL IA: NEGATIVE
LYMPHOCYTES # BLD AUTO: 0.05 K/UL (ref 1–4.8)
LYMPHOCYTES NFR BLD: 1.2 % (ref 22–41)
M TB CMPLX DNA ISLT/SPM QL: NOT DETECTED
MAGNESIUM SERPL-MCNC: 1.7 MG/DL (ref 1.5–2.5)
MCH RBC QN AUTO: 30.9 PG (ref 27–33)
MCHC RBC AUTO-ENTMCNC: 33.3 G/DL (ref 33.7–35.3)
MCV RBC AUTO: 92.8 FL (ref 81.4–97.8)
MONOCYTES # BLD AUTO: 0.1 K/UL (ref 0–0.85)
MONOCYTES NFR BLD AUTO: 2.4 % (ref 0–13.4)
NEUTROPHILS # BLD AUTO: 3.92 K/UL (ref 1.82–7.42)
NEUTROPHILS NFR BLD: 95 % (ref 44–72)
NRBC # BLD AUTO: 0 K/UL
NRBC BLD-RTO: 0 /100 WBC
PHOSPHATE SERPL-MCNC: 3.6 MG/DL (ref 2.5–4.5)
PLATELET # BLD AUTO: 44 K/UL (ref 164–446)
PMV BLD AUTO: 12.7 FL (ref 9–12.9)
POTASSIUM SERPL-SCNC: 3.2 MMOL/L (ref 3.6–5.5)
PROT SERPL-MCNC: 4.7 G/DL (ref 6–8.2)
RBC # BLD AUTO: 2.49 M/UL (ref 4.7–6.1)
RHODAMINE-AURAMINE STN SPEC: NORMAL
S PNEUM AG UR QL: NEGATIVE
SIGNIFICANT IND 70042: NORMAL
SITE SITE: NORMAL
SODIUM SERPL-SCNC: 141 MMOL/L (ref 135–145)
SOURCE SOURCE: NORMAL
WBC # BLD AUTO: 4.1 K/UL (ref 4.8–10.8)

## 2022-10-04 PROCEDURE — A9270 NON-COVERED ITEM OR SERVICE: HCPCS | Performed by: STUDENT IN AN ORGANIZED HEALTH CARE EDUCATION/TRAINING PROGRAM

## 2022-10-04 PROCEDURE — 700102 HCHG RX REV CODE 250 W/ 637 OVERRIDE(OP): Performed by: INTERNAL MEDICINE

## 2022-10-04 PROCEDURE — 700111 HCHG RX REV CODE 636 W/ 250 OVERRIDE (IP): Performed by: INTERNAL MEDICINE

## 2022-10-04 PROCEDURE — A9270 NON-COVERED ITEM OR SERVICE: HCPCS | Performed by: INTERNAL MEDICINE

## 2022-10-04 PROCEDURE — 700105 HCHG RX REV CODE 258: Performed by: INTERNAL MEDICINE

## 2022-10-04 PROCEDURE — 94640 AIRWAY INHALATION TREATMENT: CPT

## 2022-10-04 PROCEDURE — 94760 N-INVAS EAR/PLS OXIMETRY 1: CPT

## 2022-10-04 PROCEDURE — 99233 SBSQ HOSP IP/OBS HIGH 50: CPT | Performed by: HOSPITALIST

## 2022-10-04 PROCEDURE — 770020 HCHG ROOM/CARE - TELE (206)

## 2022-10-04 PROCEDURE — 85025 COMPLETE CBC W/AUTO DIFF WBC: CPT

## 2022-10-04 PROCEDURE — 700102 HCHG RX REV CODE 250 W/ 637 OVERRIDE(OP): Performed by: STUDENT IN AN ORGANIZED HEALTH CARE EDUCATION/TRAINING PROGRAM

## 2022-10-04 PROCEDURE — 84100 ASSAY OF PHOSPHORUS: CPT

## 2022-10-04 PROCEDURE — 80053 COMPREHEN METABOLIC PANEL: CPT

## 2022-10-04 PROCEDURE — 99233 SBSQ HOSP IP/OBS HIGH 50: CPT | Performed by: INTERNAL MEDICINE

## 2022-10-04 PROCEDURE — 71045 X-RAY EXAM CHEST 1 VIEW: CPT

## 2022-10-04 PROCEDURE — 83735 ASSAY OF MAGNESIUM: CPT

## 2022-10-04 RX ADMIN — OXYMETAZOLINE HCL 2 SPRAY: 0.05 SPRAY NASAL at 18:13

## 2022-10-04 RX ADMIN — FUROSEMIDE 40 MG: 10 INJECTION, SOLUTION INTRAMUSCULAR; INTRAVENOUS at 05:39

## 2022-10-04 RX ADMIN — OXYMETAZOLINE HCL 2 SPRAY: 0.05 SPRAY NASAL at 05:40

## 2022-10-04 RX ADMIN — PIPERACILLIN SODIUM AND TAZOBACTAM SODIUM 3.38 G: 3; .375 INJECTION, POWDER, FOR SOLUTION INTRAVENOUS at 21:15

## 2022-10-04 RX ADMIN — PIPERACILLIN SODIUM AND TAZOBACTAM SODIUM 3.38 G: 3; .375 INJECTION, POWDER, FOR SOLUTION INTRAVENOUS at 05:39

## 2022-10-04 RX ADMIN — SALINE NASAL SPRAY 2 SPRAY: 1.5 SOLUTION NASAL at 05:40

## 2022-10-04 RX ADMIN — SENNOSIDES AND DOCUSATE SODIUM 2 TABLET: 50; 8.6 TABLET ORAL at 05:40

## 2022-10-04 RX ADMIN — PIPERACILLIN SODIUM AND TAZOBACTAM SODIUM 3.38 G: 3; .375 INJECTION, POWDER, FOR SOLUTION INTRAVENOUS at 13:27

## 2022-10-04 ASSESSMENT — ENCOUNTER SYMPTOMS
FEVER: 0
BACK PAIN: 0
COUGH: 1
BLURRED VISION: 0
VOMITING: 0
DOUBLE VISION: 0
SPUTUM PRODUCTION: 0
ABDOMINAL PAIN: 0
HEMOPTYSIS: 0
CHILLS: 0
ORTHOPNEA: 0
DIARRHEA: 0
DIZZINESS: 0
NAUSEA: 0
PALPITATIONS: 0
HEADACHES: 0
SHORTNESS OF BREATH: 1
SORE THROAT: 0
SPUTUM PRODUCTION: 1
WHEEZING: 0
LOSS OF CONSCIOUSNESS: 0

## 2022-10-04 ASSESSMENT — PAIN DESCRIPTION - PAIN TYPE
TYPE: ACUTE PAIN

## 2022-10-04 ASSESSMENT — PATIENT HEALTH QUESTIONNAIRE - PHQ9
2. FEELING DOWN, DEPRESSED, IRRITABLE, OR HOPELESS: NOT AT ALL
1. LITTLE INTEREST OR PLEASURE IN DOING THINGS: NOT AT ALL
SUM OF ALL RESPONSES TO PHQ9 QUESTIONS 1 AND 2: 0

## 2022-10-04 NOTE — DIETARY
Nutrition Services Brief Update:    Day 13 of admit.  Ceferino Fitch is a 72 y.o. male with admitting DX of NSTEMI (non-ST elevated myocardial infarction) ([facility]) [I21.4]    Current Diet: Regular with supplements.    Problem: Nutritional:  Goal: Achieve adequate nutritional intake  Description: Patient will consume >50% of meals and supplements  Outcome: Progressing. Pt PO for meals still poor with <25% but Boost %. Switched Boost VHC BID at lunch and dinner with a Boost plus at breakfast.

## 2022-10-04 NOTE — DISCHARGE PLANNING
Case Management Discharge Planning    Admission Date: 9/21/2022  GMLOS: 5.4  ALOS: 13    6-Clicks ADL Score: 24  6-Clicks Mobility Score: 24      Anticipated Discharge Dispo: Discharge Disposition:  (Discharge pending patient's medical course; uncertain @ this time)  Discharge Address: [facility] [place] [address]  Discharge Contact Phone Number: [phone]    DME Needed: No    Action(s) Taken: OTHER, rounded with care team.    Escalations Completed: JORDAN Chandler for LOS 12 days    Medically Clear: No    Next Steps: Continue medical management, possible LTAC for oxygen wean and medical management.    Barriers to Discharge: Medical clearance    Is the patient up for discharge tomorrow: No     Late entry for 10/3, 71 yo male Hx of BRADLEY and covid-19 infection July with PCR + September.  HFNC, 40L/80%, possible bronch. Abx for HCAP. EF 20 %.

## 2022-10-04 NOTE — CARE PLAN
Problem: Knowledge Deficit - Standard  Goal: Patient and family/care givers will demonstrate understanding of plan of care, disease process/condition, diagnostic tests and medications  Outcome: Progressing     Problem: Hemodynamics  Goal: Patient's hemodynamics, fluid balance and neurologic status will be stable or improve  Outcome: Progressing     Problem: Fluid Volume  Goal: Fluid volume balance will be maintained  Outcome: Progressing     Problem: Self Care  Goal: Patient will have the ability to perform ADLs independently or with assistance (bathe, groom, dress, toilet and feed)  Outcome: Progressing     Problem: Infection - Standard  Goal: Patient will remain free from infection  Outcome: Progressing     Problem: Fall Risk  Goal: Patient will remain free from falls  Outcome: Progressing     Problem: Psychosocial  Goal: Patient's level of anxiety will decrease  Outcome: Progressing  Goal: Patient's ability to verbalize feelings about condition will improve  Outcome: Progressing     Problem: Respiratory  Goal: Patient will achieve/maintain optimum respiratory ventilation and gas exchange  Outcome: Progressing     Problem: Risk for Aspiration  Goal: Patient's risk for aspiration will be absent or decrease  Outcome: Progressing     Problem: Urinary - Renal Perfusion  Goal: Ability to achieve and maintain adequate renal perfusion and functioning will improve  Outcome: Progressing     Problem: Venous Thromboembolism (VTE) Prevention  Goal: The patient will remain free from venous thromboembolism (VTE)  Outcome: Progressing     Problem: Nutrition  Goal: Patient's nutritional and fluid intake will be adequate or improve  Outcome: Progressing  Goal: Enteral nutrition will be maintained or improve  Outcome: Progressing     Problem: Urinary Elimination  Goal: Establish and maintain regular urinary output  Outcome: Progressing     Problem: Bowel Elimination  Goal: Establish and maintain regular bowel function  Outcome:  Progressing     Problem: Pain - Standard  Goal: Alleviation of pain or a reduction in pain to the patient’s comfort goal  Outcome: Progressing      Shift Goals  Clinical Goals: Hemodynamic stability, Decrease 02 use, pain and comfort measures, safety  Patient Goals: breath better  Family Goals: updates    Progress made toward(s) clinical / shift goals:  Yes

## 2022-10-04 NOTE — PROGRESS NOTES
"Hospital Medicine Daily Progress Note    Date of Service  10/4/2022    Chief Complaint  Ceferino Fitch is a 72 y.o. male admitted 9/21/2022 with SOB    Hospital Course  73yo PMHx HTN, NHL on immunotherapy ([place], obinutuzumab), gout, HLD, CKD, HTN.  + COVID 9/13.   TTE LVEF 20% inf/inf/lat hypokinesisi, severe AS.  Has been treated with decadron and Remdesivir on this admission    Interval Problem Update  \"I think I might be a little better today\", pt states he's less SOB.  Conts to have productive cough.  No other complaints    HFNC 40L/50%  Sinus 80s  SBP   AFebrile  UOP 1400ml/24hrs on 40mg IV lasix    I have discussed this patient's plan of care and discharge plan at IDT rounds today with Case Management, Nursing, Nursing leadership, and other members of the IDT team.    Consultants/Specialty  cardiology, infectious disease, and pulmonary    Code Status  Full Code    Disposition  Patient is not medically cleared for discharge.   Anticipate discharge to to home with close outpatient follow-up.  I have placed the appropriate orders for post-discharge needs.    Review of Systems  Review of Systems   Constitutional:  Negative for chills and fever.   HENT:  Negative for nosebleeds and sore throat.    Eyes:  Negative for blurred vision and double vision.   Respiratory:  Positive for cough, sputum production and shortness of breath.    Cardiovascular:  Negative for chest pain, palpitations, orthopnea and leg swelling.   Gastrointestinal:  Negative for abdominal pain, diarrhea, nausea and vomiting.   Genitourinary:  Negative for dysuria and urgency.   Musculoskeletal:  Negative for back pain.   Skin:  Negative for rash.   Neurological:  Negative for dizziness, loss of consciousness and headaches.      Physical Exam  Temp:  [35.7 °C (96.2 °F)-36.8 °C (98.3 °F)] 36.7 °C (98 °F)  Pulse:  [81-98] 82  Resp:  [16-32] 16  BP: ()/(40-55) 99/50  SpO2:  [90 %-100 %] 90 %    Physical Exam  Constitutional:       " General: He is not in acute distress.     Appearance: Normal appearance. He is well-developed. He is not diaphoretic.   HENT:      Head: Normocephalic and atraumatic.   Eyes:      Conjunctiva/sclera: Conjunctivae normal.   Neck:      Vascular: No JVD.   Cardiovascular:      Rate and Rhythm: Normal rate.      Heart sounds: No murmur heard.    No gallop.   Pulmonary:      Effort: Pulmonary effort is normal. No respiratory distress.      Breath sounds: No stridor. Rhonchi present. No wheezing or rales.   Abdominal:      Palpations: Abdomen is soft.      Tenderness: There is no abdominal tenderness. There is no guarding or rebound.   Musculoskeletal:         General: No tenderness.      Right lower leg: No edema.      Left lower leg: No edema.   Skin:     General: Skin is warm and dry.      Capillary Refill: Capillary refill takes less than 2 seconds.      Findings: No rash.   Neurological:      Mental Status: He is alert and oriented to person, place, and time.   Psychiatric:         Mood and Affect: Mood normal.         Behavior: Behavior normal.         Thought Content: Thought content normal.       Fluids    Intake/Output Summary (Last 24 hours) at 10/4/2022 0733  Last data filed at 10/4/2022 0500  Gross per 24 hour   Intake 220 ml   Output 1650 ml   Net -1430 ml       Laboratory  Recent Labs     10/02/22  0540 10/03/22  0218 10/04/22  0300   WBC 4.1* 6.1 4.1*   RBC 2.76* 2.72* 2.49*   HEMOGLOBIN 8.6* 8.5* 7.7*   HEMATOCRIT 26.2* 27.0* 23.1*   MCV 94.9 99.3* 92.8   MCH 31.2 31.3 30.9   MCHC 32.8* 31.5* 33.3*   RDW 49.7 51.8* 48.5   PLATELETCT 48* 56* 44*   MPV 12.0 12.0 12.7     Recent Labs     10/02/22  0540 10/03/22  0218 10/04/22  0300   SODIUM 138 139 141   POTASSIUM 4.2 3.6 3.2*   CHLORIDE 106 104 105   CO2 21 20 21   GLUCOSE 90 95 85   BUN 68* 67* 59*   CREATININE 1.40 1.82* 2.13*   CALCIUM 8.4* 8.3* 8.3*                   Imaging  DX-CHEST-PORTABLE (1 VIEW)   Final Result         1.  Pulmonary edema and/or  infiltrates are identified, which are stable since the prior exam.   2.  Soft tissue gas in the chest wall and neck, increased on the left since prior study..   3.  Atherosclerosis      DX-CHEST-PORTABLE (1 VIEW)   Final Result         1.  Pulmonary edema and/or infiltrates are identified, which are stable since the prior exam.   2.  Right chest wall and neck soft tissue gas.   3.  Atherosclerosis      DX-CHEST-PORTABLE (1 VIEW)   Final Result      1.  No visible pneumothorax. This could be obscured by the patient's pneumomediastinum and soft tissue gas.   2.  Unchanged BILATERAL pulmonary opacities      DX-CHEST-PORTABLE (1 VIEW)   Final Result      1.  No visible pneumothorax. This could be obscured by the patient's pneumomediastinum and soft tissue gas.   2.  Unchanged BILATERAL pulmonary opacities      CT-CHEST (THORAX) W/O   Final Result      1.  Extensive pneumomediastinum with air within the soft tissues of the right neck and chest.   2.  Small right pneumothorax.   3.  Patchy extensive bilateral groundglass opacities with more dependent consolidative opacities are nonspecific, Covid Pneumonia versus other infectious/inflammatory etiology   4.  Sclerotic lesions in the right T11 and T12 levels in keeping with sclerotic osseous metastases   These findings were discussed with PROSPER BOWMAN III on 10/1/2022 4:49 PM.      Fleischner Society pulmonary nodule recommendations:   Not Applicable         DX-CHEST-PORTABLE (1 VIEW)   Final Result      1.  There is a new small right apical pneumothorax with new right-sided subcutaneous air extending into the neck. Question of a small amount of mediastinal air.   2.  Interval increase in patchy lower lobe opacities which could be atelectasis or pneumonitis or edema.      3.  Findings were discussed with Eleno, the patient's nurse at 2:58 PM on 10/1/2022 on 10/1/2022 2:58 PM. He states he will give this message to the physician taking care of the patient.       DX-CHEST-PORTABLE (1 VIEW)   Final Result      1.  Right-sided Tangent-Frannie catheter tip projects in the distal right main pulmonary artery region. No pneumothorax.   2.  Mild interval worsening in hazy bilateral pulmonary opacities.         DX-CHEST-PORTABLE (1 VIEW)   Final Result      1.  Right-sided Tangent-Frannie catheter tip projects in the distal right main pulmonary artery region. No pneumothorax.   2.  Probable slight improvement in hazy bilateral pulmonary opacities.      DX-CHEST-PORTABLE (1 VIEW)   Final Result      1.  Right-sided PICC line unchanged in position.      2.  Increased interstitial markings throughout the lung fields especially in the perihilar regions consistent with pulmonary edema and/or pneumonitis.      CT-TAVR CHEST-ABD-PELVIS W/WO POST PROCESS   Final Result      1.  Limited by lack of intravenous contrast      2.  Aortic valve Agatston score 4565 and the annulus measures approximately 450 sq mm      3.  Coronary arteries originate over a centimeter above the annulus      4.  Severe atherosclerotic change with marked narrowing of the iliofemoral runoff, right worse than left      5.  Multifocal groundglass greater than bronchial thickening is compatible with known Covid pneumonia. There is also evidence of lower lung zone scarring, possible interstitial lung disease and small pleural fluid      6.  Colonic diverticulosis, emphysema, left heart enlargement      DX-CHEST-PORTABLE (1 VIEW)   Final Result      Unchanged BILATERAL pneumonia      EC-ECHOCARDIOGRAM COMPLETE W/O CONT   Final Result      DX-CHEST-LIMITED (1 VIEW)   Final Result      1.  Multifocal pneumonia. Pulmonary edema could have a similar appearance.      US-RUQ   Final Result      Unremarkable RIGHT upper quadrant ultrasound           Assessment/Plan  * Acute respiratory failure with hypoxia (HCC)  Assessment & Plan  Respiratory failure in setting of covid -19 vs ? HF   Incentive spirometry  Currently he is on high flow  oxygen to maintain oxygen saturation. 40L/50%  Pulm following  Has been treated for COVID with decadron and Remdesivir  ?secondary infectious process: ID consulted and following  Currently on Pip/Tazo  Acyclovir is for prophylaxis    Secondary spontaneous pneumothorax  Assessment & Plan  He found to have a pneumothorax on chest x-ray October 02, 2022.  Pulmonary and infectious disease consulted.  Continue to monitor closely.  Daily chest x-ray.    Heart failure with reduced ejection fraction (HCC)  Assessment & Plan  Echo showed severely reduced ejection fraction   Cardiology is following him.  Monitor volume status; on IV lasix  Need to be cautious with diuresis given AS  Afterload reduction on hydralazine  Initiate GDMT as pressures allow      Advance care planning  Assessment & Plan  Discussed by prior treatment team.  had a prolonged discussion with the patient regarding goals of care, diagnoses, prognosis, and CODE STATUS. We discussed his   prognosis and comorbidities. I spent  16  minutes on advanced care planning in addition to the time spent managing the other medical problems.    He requested to remain full code      Elevated liver enzymes  Assessment & Plan  Hepatitis panel came back negative.  right upper quadrant ultrasound did not show any acute abnormalities.  Could be secondary to remdesivir.  Liver enzyme is trending down.  Continue to monitor.    Elevated brain natriuretic peptide (BNP) level  Assessment & Plan  Likely in setting of COVID-19 infection  Euvolemic on exam  Echo showed reduced ejection fraction.    CKD (chronic kidney disease)  Assessment & Plan  Renal function has been fluctuating.  Increased BUN and creatinine this morning.  Continue to monitor  Avoid nephrotoxin    Pancytopenia (HCC)  Assessment & Plan  In setting of immunotherapy  White blood cell count has been fluctuating.  Continue to monitor    Hypokalemia  Assessment & Plan  Replace as needed.  Continue to monitor.      Low  bicarbonate  Assessment & Plan  In setting of CKD  Continue to monitor.    COVID  Assessment & Plan    Activate sepsis protocol  COVID Isolation  He was placed on oxygen nasal cannula but he developed hypoxia again and he was placed on high flow nasal cannula.  Completed Decadron last dose October 1, 2022  He completed course of remdesivir.  Repeat COVID testing ordered by pulmonologist came back positive.  Monitor oxygen saturation closely      NSTEMI (non-ST elevated myocardial infarction) (HCC)- (present on admission)  Assessment & Plan    Echo showing ef of 20% also severer Aortic stenosis  Cardiology evaluated him and recommended to reconsult for possible stress echo when he is more stable.  No symptoms of acute chest pain.      Essential hypertension, benign- (present on admission)  Assessment & Plan  Mild hypotension.  Continue to monitor.    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    last immunotherapy infusion to treat his cancer in [place] on 09/09/2022  Follow-up with outpatient       VTE prophylaxis: enoxaparin ppx    I have performed a physical exam and reviewed and updated ROS and Plan today (10/4/2022). In review of yesterday's note (10/3/2022), there are no changes except as documented above.

## 2022-10-04 NOTE — CARE PLAN
The patient is Watcher - Medium risk of patient condition declining or worsening    Shift Goals  Clinical Goals: monitor O2; vitasl  Patient Goals: Breath better  Family Goals: KARL    Progress made toward(s) clinical / shift goals:    Problem: Knowledge Deficit - Standard  Goal: Patient and family/care givers will demonstrate understanding of plan of care, disease process/condition, diagnostic tests and medications  Outcome: Progressing     Problem: Hemodynamics  Goal: Patient's hemodynamics, fluid balance and neurologic status will be stable or improve  Outcome: Progressing     Problem: Fluid Volume  Goal: Fluid volume balance will be maintained  Outcome: Progressing     Problem: Self Care  Goal: Patient will have the ability to perform ADLs independently or with assistance (bathe, groom, dress, toilet and feed)  Outcome: Progressing     Problem: Infection - Standard  Goal: Patient will remain free from infection  Outcome: Progressing     Problem: Fall Risk  Goal: Patient will remain free from falls  Outcome: Progressing     Problem: Psychosocial  Goal: Patient's level of anxiety will decrease  Outcome: Progressing  Goal: Patient's ability to verbalize feelings about condition will improve  Outcome: Progressing  Goal: Patient's ability to re-evaluate and adapt role responsibilities will improve  Outcome: Progressing  Goal: Patient and family will demonstrate ability to cope with life altering diagnosis and/or procedure  Outcome: Progressing  Goal: Spiritual and cultural needs incorporated into hospitalization  Outcome: Progressing     Problem: Respiratory  Goal: Patient will achieve/maintain optimum respiratory ventilation and gas exchange  Outcome: Progressing     Problem: Risk for Aspiration  Goal: Patient's risk for aspiration will be absent or decrease  Outcome: Progressing     Problem: Urinary - Renal Perfusion  Goal: Ability to achieve and maintain adequate renal perfusion and functioning will  improve  Outcome: Progressing     Problem: Venous Thromboembolism (VTE) Prevention  Goal: The patient will remain free from venous thromboembolism (VTE)  Outcome: Progressing     Problem: Nutrition  Goal: Patient's nutritional and fluid intake will be adequate or improve  Outcome: Progressing  Goal: Enteral nutrition will be maintained or improve  Outcome: Progressing  Goal: Enteral nutrition will be maintained or improve  Outcome: Progressing     Problem: Urinary Elimination  Goal: Establish and maintain regular urinary output  Outcome: Progressing     Problem: Bowel Elimination  Goal: Establish and maintain regular bowel function  Outcome: Progressing     Problem: Pain - Standard  Goal: Alleviation of pain or a reduction in pain to the patient’s comfort goal  Outcome: Progressing     Problem: Physical Regulation  Goal: Diagnostic test results will improve  Outcome: Progressing  Goal: Signs and symptoms of infection will decrease  Outcome: Progressing       Patient is not progressing towards the following goals:

## 2022-10-04 NOTE — PROGRESS NOTES
Infectious Disease Progress Note    Author: Lucía Izquierdo M.D. Date & Time of service: 10/4/2022  12:01 PM    Chief Complaint:  Prior COVID  Hypoxemic resp failure  NSTEMI    Interval History:  72 y.o. 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 He was treated with decadron, doxycycline, lasix and ID previous eval added remdesevir on 2022.  All cultures and f/u viral studies have been otherwise negative CT scan done 10/1 showed pneumomediastinum, PTX and ext groundglass opacities +mets   10/4 AF WBC 4.1 remains on HFNC some dry cough-feels a little better-tolerating some PO. Creat worse  Labs Reviewed, Medications Reviewed, and Radiology Reviewed.    Review of Systems:  Review of Systems   Constitutional:  Negative for fever and malaise/fatigue.   Respiratory:  Positive for cough and shortness of breath. Negative for hemoptysis, sputum production and wheezing.    All other systems reviewed and are negative.    Hemodynamics:  Temp (24hrs), Av.4 °C (97.6 °F), Min:35.7 °C (96.2 °F), Max:36.8 °C (98.3 °F)  Temperature: 36.8 °C (98.2 °F)  Pulse  Av.2  Min: 58  Max: 107   Blood Pressure : (!) 92/51      Physical Exam:  Physical Exam  Vitals and nursing note reviewed.   Constitutional:       General: He is not in acute distress.     Appearance: He is ill-appearing. He is not toxic-appearing or diaphoretic.   HENT:      Nose: No rhinorrhea.   Eyes:      General: No scleral icterus.     Extraocular Movements: Extraocular movements intact.      Pupils: Pupils are equal, round, and reactive to light.   Cardiovascular:      Rate and Rhythm: Normal rate. Rhythm irregular.   Pulmonary:      Effort: Pulmonary effort is normal. No respiratory distress.      Breath sounds: No stridor. Rhonchi present. No wheezing.   Abdominal:      General: There is no distension.      Palpations: Abdomen is soft.      Tenderness: There is no abdominal tenderness.   Musculoskeletal:      Cervical back:  Neck supple.   Lymphadenopathy:      Cervical: No cervical adenopathy.   Skin:     Coloration: Skin is pale. Skin is not jaundiced.      Findings: Bruising and lesion present.   Neurological:      General: No focal deficit present.      Mental Status: He is alert and oriented to person, place, and time.   Psychiatric:         Mood and Affect: Mood normal.         Behavior: Behavior normal.       Meds:    Current Facility-Administered Medications:     [COMPLETED] piperacillin-tazobactam **AND** piperacillin-tazobactam    sodium chloride    oxymetazoline    [Held by provider] enoxaparin (LOVENOX) injection    furosemide    menthol    guaiFENesin dextromethorphan    lidocaine    hydrALAZINE    acyclovir    senna-docusate **AND** polyethylene glycol/lytes **AND** magnesium hydroxide **AND** bisacodyl    Labs:  Recent Labs     10/02/22  0540 10/03/22  0218 10/04/22  0300   WBC 4.1* 6.1 4.1*   RBC 2.76* 2.72* 2.49*   HEMOGLOBIN 8.6* 8.5* 7.7*   HEMATOCRIT 26.2* 27.0* 23.1*   MCV 94.9 99.3* 92.8   MCH 31.2 31.3 30.9   RDW 49.7 51.8* 48.5   PLATELETCT 48* 56* 44*   MPV 12.0 12.0 12.7   NEUTSPOLYS 94.90* 95.40* 95.00*   LYMPHOCYTES 1.70* 1.00* 1.20*   MONOCYTES 2.70 2.50 2.40   EOSINOPHILS 0.20 0.30 0.70   BASOPHILS 0.00 0.00 0.20     Recent Labs     10/02/22  0540 10/03/22  0218 10/04/22  0300   SODIUM 138 139 141   POTASSIUM 4.2 3.6 3.2*   CHLORIDE 106 104 105   CO2 21 20 21   GLUCOSE 90 95 85   BUN 68* 67* 59*     Recent Labs     10/02/22  0540 10/03/22  0218 10/04/22  0300   ALBUMIN 2.7* 2.6* 2.7*   TBILIRUBIN 0.6 0.8 0.7   ALKPHOSPHAT 453* 376* 294*   TOTPROTEIN 4.5* 4.7* 4.7*   ALTSGPT 67* 53* 35   ASTSGOT 22 20 13   CREATININE 1.40 1.82* 2.13*       Imaging:  CT-CHEST (THORAX) W/O    Result Date: 10/1/2022  10/1/2022 4:27 PM HISTORY/REASON FOR EXAM:  Dyspnea, chronic, unclear etiology. TECHNIQUE/EXAM DESCRIPTION: CT scan of the chest without contrast. Noncontrast helical scanning of the chest was obtained from the  lung apices through the adrenal glands. Low dose optimization technique was utilized for this CT exam including automated exposure control and adjustment of the mA and/or kV according to patient size. COMPARISON: CT TAVR 9/23/2022 FINDINGS: Lungs: Extensive pulmonary ground glass opacities with more dependent consolidative opacities. Mediastinum/Alexandra: No significant adenopathy. Pneumomediastinum is new from prior CT. Pleura: No pleural effusion. Tiny right pneumothorax. Cardiac: Heart size is stable without pericardial effusion. Vascular: Severe atherosclerosis. Aortic valve calcification. Right PICC line terminates in the proximal right atrium. Soft tissues: Air within the soft tissues of the right neck and chest. Bones: There is asymmetric sclerosis seen within the T11 and T12 levels involving the right pedicles and lamina. Given the focal nature this raises suspicion for metastatic disease..     1.  Extensive pneumomediastinum with air within the soft tissues of the right neck and chest. 2.  Small right pneumothorax. 3.  Patchy extensive bilateral groundglass opacities with more dependent consolidative opacities are nonspecific, Covid Pneumonia versus other infectious/inflammatory etiology 4.  Sclerotic lesions in the right T11 and T12 levels in keeping with sclerotic osseous metastases These findings were discussed with PROSPER BOWMAN III on 10/1/2022 4:49 PM. Fleischner Society pulmonary nodule recommendations: Not Applicable     CT-HEAD W/O    Result Date: 9/21/2022  HISTORY/REASON FOR EXAM: Delirium; altered. altered TECHNIQUE/EXAM DESCRIPTION: CT scan of the brain, 9/21/2022 11:44 AM. This examination was conducted with Automated Exposure Control and Automated Adjustment of Tube Current based on patient size. Multiple noncontrast axial images of the head were performed from the skull base through the high parietal convexities. COMPARISON: None. TOTAL DLP: 7:15 mGy*cm FINDINGS: There are prominent  atherosclerotic calcifications of intracerebral vertebral arteries, as well as intracerebral internal carotid arteries. There is mild diffuse decreased attenuation noted throughout the periventricular deep white matter of the brain.  This finding is suggestive of chronic demyelination resulting from chronic microvascular atherosclerotic disease. I do not see evidence of brain mass. I do not see evidence of brain hemorrhage or abnormal intra-axial fluid collection There is mild prominence of ventricles and sulcations, compatible generalized atrophy. Note is made of opacification of several ethmoid air cells, as well as mucoperiosteal thickening within the maxillary sinuses, compatible with sinusitis.     I do not see evidence of acute brain injury although there is evidence of white matter degenerative changes, as well as atherosclerotic disease of the cerebral vasculature. There is evidence of ethmoid and maxillary sinusitis. DLP Reporting Thresholds for Incorrect/Repeated Exams - DLP in mGy*cm Head/Neck:  0-year-old 3840, 1-year-old 5880, 5-year-old 8770, 10-year-old 21781 and adult 53281 Head:  0-year-old 4540, 1-year-old 7460, 5-year-old 72030, 10-year-old 90083 and adult 19729 Neck:  0-year-old 2940, 1-year-old 4160, 5-year-old 4550, 10-year-old 6320 and adult 8470 Chest:  0-year-old 550, 1-year-old 830, 5-year-old 1200, 10-year-old 3840 and adult 3570 Abd/pelvis:  0-year-old 440, 1-year-old 720, 5-year-old 1080, 10-year-old 3330 and adult 3330 Trunk(C/A/P):  0-year-old 490, 1-year-old 770, 5-year-old 1140, 10-year-old 3570 and adult 3330    DX-CHEST-LIMITED (1 VIEW)    Result Date: 9/22/2022 9/22/2022 8:55 AM HISTORY/REASON FOR EXAM:  Abnormal finding of lung field TECHNIQUE/EXAM DESCRIPTION AND NUMBER OF VIEWS: Single portable view of the chest. COMPARISON: None FINDINGS: Right side PICC line with tip projecting at the cavoatrial junction. HEART: Not enlarged. There is atherosclerotic calcification in the  aortic arch. LUNGS: Patchy bilateral airspace opacities. PLEURA: No effusion or pneumothorax.     1.  Multifocal pneumonia. Pulmonary edema could have a similar appearance.    DX-CHEST-PORTABLE (1 VIEW)    Result Date: 10/4/2022    10/4/2022 1:40 AM HISTORY/REASON FOR EXAM: Shortness of Breath TECHNIQUE/EXAM DESCRIPTION:  Single AP view of the chest. COMPARISON: Yesterday FINDINGS: Position of medical devices appears stable. The cardiac silhouette appears within normal limits. Atherosclerotic calcification of the aorta is noted.  The central  pulmonary vasculature appears prominent and indistinct. Bilateral lung volumes are diminished.  Diffuse scattered hazy pulmonary parenchymal opacities are seen. No significant pleural effusions are identified. The bony structures appear age-appropriate.  Soft tissue gas in the bilateral chest wall and neck is seen, increased since prior study.     1.  Pulmonary edema and/or infiltrates are identified, which are stable since the prior exam. 2.  Soft tissue gas in the chest wall and neck, increased on the left since prior study.. 3.  Atherosclerosis    DX-CHEST-PORTABLE (1 VIEW)    Result Date: 10/3/2022    10/3/2022 12:40 AM HISTORY/REASON FOR EXAM: Shortness of Breath TECHNIQUE/EXAM DESCRIPTION:  Single AP view of the chest. COMPARISON: Yesterday FINDINGS: Position of medical devices appears stable. The cardiac silhouette appears within normal limits. Atherosclerotic calcification of the aorta is noted.  The central  pulmonary vasculature appears prominent and indistinct. Bilateral lung volumes are diminished.  Diffuse scattered hazy pulmonary parenchymal opacities are seen. No significant pleural effusions are identified. The bony structures appear age-appropriate.  Soft tissue gas in the right chest wall and neck is seen.     1.  Pulmonary edema and/or infiltrates are identified, which are stable since the prior exam. 2.  Right chest wall and neck soft tissue gas. 3.   Atherosclerosis    DX-CHEST-PORTABLE (1 VIEW)    Result Date: 10/2/2022  10/2/2022 6:06 AM HISTORY/REASON FOR EXAM:  Shortness of Breath TECHNIQUE/EXAM DESCRIPTION AND NUMBER OF VIEWS: Single portable view of the chest. COMPARISON: Yesterday FINDINGS: Hardware is stably positioned in its visualized extent. RIGHT chest wall and neck base gas is redemonstrated. HEART: Stable size. Pneumomediastinum is redemonstrated. LUNGS: Pulmonary opacities are unchanged. PLEURA: No effusion or pneumothorax.     1.  No visible pneumothorax. This could be obscured by the patient's pneumomediastinum and soft tissue gas. 2.  Unchanged BILATERAL pulmonary opacities    DX-CHEST-PORTABLE (1 VIEW)    Result Date: 10/1/2022  10/1/2022 8:29 PM HISTORY/REASON FOR EXAM:  RIGHT pneumothorax TECHNIQUE/EXAM DESCRIPTION AND NUMBER OF VIEWS: Single portable view of the chest. COMPARISON: Study from earlier the same date at 2:18 PM FINDINGS: Hardware is stably positioned in its visualized extent. RIGHT chest wall and neck base gas is redemonstrated. HEART: Stable size. Pneumomediastinum is redemonstrated. LUNGS: Pulmonary opacities are unchanged. PLEURA: No effusion or pneumothorax.     1.  No visible pneumothorax. This could be obscured by the patient's pneumomediastinum and soft tissue gas. 2.  Unchanged BILATERAL pulmonary opacities    DX-CHEST-PORTABLE (1 VIEW)    Result Date: 10/1/2022  10/1/2022 2:36 PM HISTORY/REASON FOR EXAM:  Shortness of Breath TECHNIQUE/EXAM DESCRIPTION AND NUMBER OF VIEWS: Single portable view of the chest. COMPARISON: 9/26/2022 FINDINGS: The right IJ [place]-Frannie catheter is been removed. There is a right PICC line in place with the tip projecting over the right atrium. There is right-sided subcutaneous air extending into the neck. Question of a small amount of mediastinal air. The mediastinal and cardiac silhouette is unchanged in size. The pulmonary vascularity is within normal limits. Diffuse bilateral interstitial and  airspace opacities are again seen slightly progressed since the prior study in the lower lung zones. There is no significant pleural effusion. There is a small right apical pneumothorax. Bones are unchanged.     1.  There is a new small right apical pneumothorax with new right-sided subcutaneous air extending into the neck. Question of a small amount of mediastinal air. 2.  Interval increase in patchy lower lobe opacities which could be atelectasis or pneumonitis or edema. 3.  Findings were discussed with Eleno, the patient's nurse at 2:58 PM on 10/1/2022 on 10/1/2022 2:58 PM. He states he will give this message to the physician taking care of the patient.    DX-CHEST-PORTABLE (1 VIEW)    Result Date: 9/26/2022 9/26/2022 10:09 AM HISTORY/REASON FOR EXAM:  Hudson placement. TECHNIQUE/EXAM DESCRIPTION AND NUMBER OF VIEWS: Single portable view of the chest. COMPARISON: One day prior FINDINGS: Stable right PICC line. A right internal jugular Hudson-Frannie catheter tip projects in the distal right main pulmonary artery. Cardiomediastinal silhouette is stable. Aortic calcified atherosclerotic plaque. Interstitial and hazy pulmonary opacities appear mildly worsened. No significant pleural effusion or pneumothorax. No acute osseous abnormality.     1.  Right-sided Hudson-Frannie catheter tip projects in the distal right main pulmonary artery region. No pneumothorax. 2.  Mild interval worsening in hazy bilateral pulmonary opacities.     DX-CHEST-PORTABLE (1 VIEW)    Result Date: 9/25/2022 9/25/2022 11:25 AM HISTORY/REASON FOR EXAM:  central line/swan. TECHNIQUE/EXAM DESCRIPTION AND NUMBER OF VIEWS: Single portable view of the chest. COMPARISON: One day prior FINDINGS: Stable right PICC line. A right internal jugular Hudson-Frannie catheter tip projects in the distal right main pulmonary artery. Cardiomediastinal silhouette is stable. Aortic calcified atherosclerotic plaque. Interstitial and hazy pulmonary opacities appear slightly  improved. No significant pleural effusion or pneumothorax. No acute osseous abnormality.     1.  Right-sided Monroe-Frannie catheter tip projects in the distal right main pulmonary artery region. No pneumothorax. 2.  Probable slight improvement in hazy bilateral pulmonary opacities.    DX-CHEST-PORTABLE (1 VIEW)    Result Date: 9/24/2022 9/24/2022 9:30 AM HISTORY/REASON FOR EXAM:  Shortness of Breath. TECHNIQUE/EXAM DESCRIPTION AND NUMBER OF VIEWS: Single portable view of the chest. COMPARISON: Test x-ray 9/23/2022 FINDINGS: Right-sided PICC line with distal tip at the caval atrial junction. Heart size is within normal limits. Increased interstitial markings throughout the lung fields especially in the perihilar regions No pleural abnormalities are noted.     1.  Right-sided PICC line unchanged in position. 2.  Increased interstitial markings throughout the lung fields especially in the perihilar regions consistent with pulmonary edema and/or pneumonitis.    DX-CHEST-PORTABLE (1 VIEW)    Result Date: 9/23/2022 9/23/2022 2:55 PM HISTORY/REASON FOR EXAM:  Shortness of Breath TECHNIQUE/EXAM DESCRIPTION AND NUMBER OF VIEWS: Single portable view of the chest. COMPARISON: Yesterday FINDINGS: Hardware is stably positioned in its visualized extent. HEART: Stable size. LUNGS: BILATERAL pulmonary opacities relatively sparing the LEFT upper lobe are not appreciably changed. PLEURA: No effusion or pneumothorax.     Unchanged BILATERAL pneumonia    DX-CHEST-PORTABLE (1 VIEW)    Result Date: 9/21/2022  HISTORY/REASON FOR EXAM: ALTERED LEVEL OF CONSCIOUSNESS; LOSS OF APPETITE. TECHNIQUE/EXAM DESCRIPTION: Chest x-ray, one portable view, 9/21/2022 11:44 AM. COMPARISON: 9/13/2022 FINDINGS: Prominent areas of infiltrative developed involving bases and mid portions of right and left lungs, compatible with severe bilateral pneumonia. Right-sided PICC line appears in stable position. Heart is normal in size.     There is evidence of  interval development of severe bilateral pneumonia.    DX-CHEST-PORTABLE (1 VIEW)    Result Date: 9/13/2022  HISTORY/REASON FOR EXAM: cough. TECHNIQUE/EXAM DESCRIPTION: Chest x-ray, one portable view, 9/13/2022 11:29 AM. COMPARISON: 9/4/2022 FINDINGS: Slight elevation of left hemidiaphragm is again noted, with left basilar atelectasis appearing stable. Questionable increasing density at the right lung base may warrant follow-up chest radiograph, to exclude developing pneumonia if clinically relevant. PICC line remain in stable position. Heart is normal in size.     Equivocal ill-defined increasing density right lung base is noted. Consideration may be given to follow-up chest radiograph to exclude developing process.    US-RUQ    Result Date: 9/22/2022 9/22/2022 7:35 AM HISTORY/REASON FOR EXAM:  Abnormal Labs Abdominal pain TECHNIQUE/EXAM DESCRIPTION AND NUMBER OF VIEWS:  Real-time sonography of the liver and biliary tree. COMPARISON: None FINDINGS: The liver is normal in contour. There is no evidence of solid mass lesion. The liver measures 13.32 cm. The gallbladder is normal. There is no evidence of cholelithiasis. The gallbladder wall thickness measures 2.50 mm. There is no pericholecystic fluid. The common duct measures 4.60 mm. The visualized pancreas is unremarkable. The visualized aorta is normal in caliber. Intrahepatic IVC is patent. The portal vein is patent with hepatopetal flow. The MPV measures 0.90 cm. The right kidney measures 10.26 cm. There is no ascites.     Unremarkable RIGHT upper quadrant ultrasound    US-RENAL    Result Date: 9/14/2022  HISTORY/REASON FOR EXAM:  Abnormal Labs. TECHNIQUE/EXAM DESCRIPTION: Renal ultrasound, 9/14/2022 2:17 PM. COMPARISON: 2/28/2022 FINDINGS: Right Kidney: The right kidney measures 8.7 x 5.3 x 5.5 cm. The renal cortex is normal in thickness and echogenicity. There is no hydronephrosis. There is no solid mass identified. There is no cystic lesion identified. There  are no renal calculi identified. Left Kidney: The left kidney measures 8.7 x 4.5 x 4.0 cm.  The renal cortex is normal in thickness and echogenicity. There is no hydronephrosis. There is no solid mass identified. There is a 1.5 cm cyst midpole lateral aspect left kidney. No change. There are no renal calculi identified. Bladder: The bladder is normal. Patient unable to void.     Small stable left renal cyst. Altaf Perez MD 2022 3:48 PM    EC-ECHOCARDIOGRAM COMPLETE W/O CONT    Result Date: 2022  Transthoracic Echo Report Echocardiography Laboratory CONCLUSIONS No prior study is available for comparison. Reduced left ventricular systolic function. The left ventricular ejection fraction is visually estimated to be 20%. Hypokinesis of the inferior and inferolateral segments. Cannot rule out low flow low gradient severe aortic stenosis. Mild eccentric aortic insufficiency. ABBY SUSSY Exam Date:         2022                    12:47 Exam Location:     Inpatient Priority:          Routine Ordering Physician:        SCOOBY RODRIGUEZ Referring Physician:       SCOOBY RODRIGUEZ Sonographer:               Basilio Bates RVT                            [facility] Age:    72     Gender:    M MRN:    [id number] :    1950 BSA:    1.86   Ht (in):    69     Wt (lb):    156 Exam Type:     Complete Indications:     Other chest pain ICD Codes:       R07.89 CPT Codes:       71383 BP:   103    /   53     HR:   75 Technical Quality:       Good MEASUREMENTS  (Male / Female) Normal Values 2D ECHO LV Diastolic Diameter PLAX        4.6 cm                4.2 - 5.9 / 3.9 - 5.3 cm LV Systolic Diameter PLAX         2.9 cm                2.1 - 4.0 cm IVS Diastolic Thickness           0.88 cm               LVPW Diastolic Thickness          0.99 cm               LVOT Diameter                     2 cm                  Estimated LV Ejection Fraction    20 %                  IVC Diameter                      1.2 cm                 DOPPLER AV Peak Velocity                  2.6 m/s               AV Peak Gradient                  27.9 mmHg             AV Mean Gradient                  17.3 mmHg             LVOT Peak Velocity                0.97 m/s              AV Area Cont Eq vti               1 cm2                 Mitral E Point Velocity           0.78 m/s              Mitral E to A Ratio               0.66                  Mitral A Duration                 125 ms                MV Pressure Half Time             50.9 ms               MV Area PHT                       4.3 cm2               MV Deceleration Time              176 ms                RVOT Peak Velocity                0.83 m/s              * Indicates values subject to auto-interpretation LV EF:  20    % FINDINGS Left Ventricle Sigmoid septum noted measuring 1.1 cm in diameter without evidence of hemodynamic compromise. The left ventricle is normal in size. Reduced left ventricular systolic function. The left ventricular ejection fraction is visually estimated to be 20%. Hypokinesis of the inferior and inferolateral segments. Normal diastolic function. Right Ventricle Normal right ventricular size and systolic function. Right Atrium The right atrium is not well visualized. Normal inferior vena cava size and inspiratory collapse. Left Atrium The left atrium is normal in size. Left atrial volume index is 22 mL/sq m. Mitral Valve Structurally normal mitral valve without significant stenosis or regurgitation. Aortic Valve Cannot rule out low flow low gradient severe aortic stenosis. Transvalvular gradients are - Peak: 28 mmHg, Mean: 18 mmHg. Vmax is 2.63 m/s. Aortic valve area calculated from the continuity equation is 1 cm2. Mild eccentric aortic insufficiency. Tricuspid Valve Structurally normal tricuspid valve without significant stenosis or regurgitation. Unable to estimate right ventricular systolic pressure due to an inadequate tricuspid regurgitant jet. Pulmonic Valve  Structurally normal pulmonic valve without significant stenosis or regurgitation. Pericardium Normal pericardium without effusion. Aorta Normal aortic root for body surface area. The ascending aorta diameter is not well visualized. Maryann DAMON To (Electronically Signed) Final Date:     22 September 2022                 14:21    CT-TAVR CHEST-ABD-PELVIS W/WO POST PROCESS    Result Date: 9/27/2022 9/23/2022 1:46 PM HISTORY/REASON FOR EXAM:  Aortic stenosis, severe, symptomatic, low EF; without contrast. Please obtain aortic valve score. Renal insufficiency TECHNIQUE/EXAM DESCRIPTION AND NUMBER OF VIEWS: Noncontrast CT chest abdomen and pelvis is obtained from above the clavicles through the pelvic floor. Thin axial slices were obtained and coronal and sagittal reformations were then generated from the axial data set. Oblique reformations were also created. Agatston score of the aortic valve was generated COMPARISON: None FINDINGS: The study is severely limited for pre-TAVR preparation due to lack of intravenous contrast. This prevents characterization of the lumen diameter is that could be affected by soft plaque. Measurements provided here in July on the diameter between regions of calcified plaque and therefore will overestimate in the context of soft plaque The tricuspid aortic valve is heavily calcified with an Agatston score of 4565. The annulus is estimated at approximately 450 sq mm but again this is imprecise without contrast The vertical distance to the left coronary artery origin is 12 mm, the right is 15 and the coronary arteries are densely calcified. There is severe atherosclerosis throughout the evaluation with circumferential involvement in many levels. The ascending aorta maximal diameter is 34 mm. The descending thoracic aorta maximal diameter is 30 mm. At the level of the diaphragm the aorta measures 25 mm. The infrarenal aorta measures 20 mm. Severe calcified plaque in the iliofemoral runoff  with the right common iliac artery narrowed to 2 mm. The left is less affected with minimal short axis diameter 5.6 mm in the common iliac, 3.9 mm external iliac levels. Again this is an over estimation due to absence of intravenous contrast. There is no circumferential calcified plaque at the common femoral levels but there is circumferential calcified plaque at the distal aorta just above the bifurcation where the lumen measures 10 x 9 mm. Miscellaneous thorax CT findings: There is multifocal groundglass with intervening areas of normal-appearing parenchyma. There is bronchial thickening. There is some dependent lower lobe consolidation and subpleural linear opacities likely indicating some interstitial lung disease and scarring. Trace pleural fluid Emphysematous change Moderate left heart enlargement Mild mediastinal and hilar adenopathy Right PICC line terminates in good position at the cavoatrial junction Miscellaneous abdomen and pelvis findings: Moderate colonic diverticulosis. Mild left renal atrophy.     1.  Limited by lack of intravenous contrast 2.  Aortic valve Agatston score 4565 and the annulus measures approximately 450 sq mm 3.  Coronary arteries originate over a centimeter above the annulus 4.  Severe atherosclerotic change with marked narrowing of the iliofemoral runoff, right worse than left 5.  Multifocal groundglass greater than bronchial thickening is compatible with known Covid pneumonia. There is also evidence of lower lung zone scarring, possible interstitial lung disease and small pleural fluid 6.  Colonic diverticulosis, emphysema, left heart enlargement      Micro:  Results       Procedure Component Value Units Date/Time    Fungal Culture [[id number]] Collected: 10/02/22 1740    Order Status: Completed Specimen: Respirate from Sputum Updated: 10/04/22 1123     Significant Indicator NEG     Source RESP     Site SPUTUM     Culture Result No clinically significant fungal growth to date.      Gram Stain Result Few WBCs.  Many Gram positive cocci.  Sputum Gram stain quality score is  <1, probable  oropharyngeal contamination. Culture not performed.  Recollect if clinically indicated.       Fungal Smear Results No fungal elements seen.    Narrative:      Collected By: [id number] MARY BERGMAN  Collected By: [id number] MARY BERGMAN    MTB/RIF Resistance (Mycobacterium tuberculosis) [[id number]] Collected: 10/03/22 1036    Order Status: No result Updated: 10/04/22 1010    Narrative:      Collected By: [id number] DELLA MENENDEZ    CULTURE RESPIRATORY W/ GRM STN [[id number]] Collected: 10/03/22 1036    Order Status: Sent Specimen: Respirate from Sputum Updated: 10/04/22 1009    Narrative:      Collected By: [id number] DELLA MENENDEZ    AFB Culture [[id number]] Collected: 10/03/22 1036    Order Status: Completed Specimen: Respirate Updated: 10/04/22 1008     Significant Indicator NEG     Source RESP     Site SPUTUM     Culture Result Culture in progress.     AFB Smear Results -    Narrative:      Collected By: [id number] DELLA MENENDEZ  Collected By: [id number] DELLA MENENDEZ    GRAM STAIN [[id number]] Collected: 10/02/22 1740    Order Status: Completed Specimen: Respirate Updated: 10/03/22 1658     Significant Indicator .     Source RESP     Site SPUTUM     Gram Stain Result Few WBCs.  Many Gram positive cocci.  Sputum Gram stain quality score is  <1, probable  oropharyngeal contamination. Culture not performed.  Recollect if clinically indicated.      Narrative:      Collected By: [id number] MARY BERGMAN  Collected By: [id number] MARY BERGMAN    Fungal Smear [[id number]] Collected: 10/02/22 1740    Order Status: Completed Specimen: Respirate Updated: 10/03/22 1658     Significant Indicator NEG     Source RESP     Site SPUTUM     Fungal Smear Results No fungal elements seen.    Narrative:      Collected By: [id number] MARY BERGMAN  Collected By: [id number] MARY BERGMAN    GRAM STAIN [[id number]] Collected: 10/03/22 1036    Order  "Status: Completed Specimen: Respirate Updated: 10/03/22 1400     Significant Indicator .     Source RESP     Site SPUTUM     Gram Stain Result Few WBCs.  Moderate mixed bacteria, no predominant organism seen.  Few Yeast.  Specimen Quality Score: 1+      Narrative:      Collected By: [id number] DELLA MENENDEZ  Collected By: [id number] DELLA HILL GEMMA    MRSA By PCR (Amp) [[id number]] Collected: 10/02/22 1740    Order Status: Completed Specimen: Respirate from Nares Updated: 10/02/22 2005     MRSA by PCR Negative    Narrative:      Collected By: [id number] MARY BERGMAN    Pneumocystis DFA [[id number]] Collected: 10/02/22 1841    Order Status: Sent Specimen: Respirate from Sputum     Respiratory Panel By PCR [[id number]]  (Abnormal) Collected: 10/02/22 1039    Order Status: Completed Specimen: Nasopharyngeal Updated: 10/02/22 1805     Adenovirus, PCR Not Detected     SARS-CoV-2 (COVID-19) RNA by ROHIT DETECTED     Comment: PATIENTS: Important information regarding your results and instructions can  be found at [email or web address]   \"After your  Covid-19 Test\"    **The [employer] Respiratory Panel 2.1 (RP2.1) RT-PCR test is FDA authorized.          Coronavirus 229E, PCR Not Detected     Coronavirus HKU1, PCR Not Detected     Coronavirus NL63, PCR Not Detected     Coronavirus OC43, PCR Not Detected     Human Metapneumovirus, PCR Not Detected     Rhino/Enterovirus, PCR Not Detected     Influenza A, PCR Not Detected     Influenza B, PCR Not Detected     Parainfluenza 1, PCR Not Detected     Parainfluenza 2, PCR Not Detected     Parainfluenza 3, PCR Not Detected     Parainfluenza 4, PCR Not Detected     RSV (Respiratory Syncytial Virus), PCR Not Detected     Bordetella parapertussis (LD5013), PCR Not Detected     Bordetella pertussis (ptxP), PCR Not Detected     Mycoplasma pneumoniae, PCR Not Detected     Chlamydia pneumoniae, PCR Not Detected    Narrative:      UTILIZATION ALERT: Has Flu/RSV/COVID PCR testing " "been  performed, resulted reviewed, and consulted with Infectious  Diseases or PICU Provider Teams?->Yes  Have you been in close contact with a person who is suspected  or known to be positive for COVID-19 within the last 30 days  (e.g. last seen that person < 30 days ago)->No    CULTURE RESPIRATORY W/ GRM STN [[id number]] Collected: 10/02/22 1740    Order Status: Canceled Specimen: Sputum     AFB Culture [[id number]] Collected: 10/02/22 1740    Order Status: Canceled Specimen: Sputum     MTB/RIF Resistance (Mycobacterium tuberculosis) [[id number]] Collected: 10/02/22 1740    Order Status: Canceled     COV-2, FLU A/B, AND RSV BY PCR (2-4 HOURS CEPAptanaID): Collect NP swab in VTM [[id number]]  (Abnormal) Collected: 10/02/22 1039    Order Status: Completed Specimen: Respirate from Nasopharyngeal Updated: 10/02/22 1312     Influenza virus A RNA Negative     Influenza virus B, PCR Negative     RSV, PCR Negative     SARS-CoV-2 by PCR DETECTED     Comment: PATIENTS: Important information regarding your results and instructions can  be found at [email or web address]   \"After your  Covid-19 Test\"    **The [employer] GeneXpert Xpress SARS-CoV-2 RT-PCR Test has been made  available for use under the Emergency Use Authorization (EUA) only.          SARS-CoV-2 Source NP Swab    Narrative:      Collected By: [id number] MARY BERGMAN            Assessment:  Active Hospital Problems    Diagnosis     *Acute respiratory failure with hypoxia (HCC) [J96.01]     Hemoptysis [R04.2]     Secondary spontaneous pneumothorax [J93.12]     Heart failure with reduced ejection fraction (HCC) [I50.20]     NSTEMI (non-ST elevated myocardial infarction) (HCC) [I21.4]     COVID [U07.1]     Low bicarbonate [E87.8]     Hypokalemia [E87.6]     Pancytopenia (HCC) [D61.818]     CKD (chronic kidney disease) [N18.9]     Elevated brain natriuretic peptide (BNP) level [R79.89]     Elevated liver enzymes [R74.8]     Advance care planning " [Z71.89]     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  Small right pneumothorax.  Patchy extensive bilateral groundglass opacities-infectious vs noninfectious  Sclerotic osseous metastases  Marked elevation BNP with known EF 20%  Not neutropenic  No fever  Thrombocytopenia  BRADLEY worse     PLAN:   Antibiotics empirically expanded for HCAP -adjust dose for renal function  Pneumocystitis pending  Fungal serologies pending  Repeat BNP improved to 2246  Bronch if feasible for diagnosis if fails to continue to improve

## 2022-10-04 NOTE — PROGRESS NOTES
.Telemetry Shift Summary     Rhythm: NSR  Rate: 82-95  Ectopy: n/a  Measurements: 0.15/0.08/0.42

## 2022-10-05 PROBLEM — E83.42 HYPOMAGNESEMIA: Status: ACTIVE | Noted: 2022-10-05

## 2022-10-05 LAB
ALBUMIN SERPL BCP-MCNC: 2.6 G/DL (ref 3.2–4.9)
ALBUMIN/GLOB SERPL: 1 G/DL
ALP SERPL-CCNC: 269 U/L (ref 30–99)
ALT SERPL-CCNC: 30 U/L (ref 2–50)
ANION GAP SERPL CALC-SCNC: 16 MMOL/L (ref 7–16)
AST SERPL-CCNC: 11 U/L (ref 12–45)
BACTERIA SPEC RESP CULT: NORMAL
BASOPHILS # BLD AUTO: 0 % (ref 0–1.8)
BASOPHILS # BLD: 0 K/UL (ref 0–0.12)
BILIRUB SERPL-MCNC: 0.7 MG/DL (ref 0.1–1.5)
BUN SERPL-MCNC: 51 MG/DL (ref 8–22)
CALCIUM SERPL-MCNC: 8.4 MG/DL (ref 8.5–10.5)
CHLORIDE SERPL-SCNC: 108 MMOL/L (ref 96–112)
CO2 SERPL-SCNC: 20 MMOL/L (ref 20–33)
CREAT SERPL-MCNC: 1.88 MG/DL (ref 0.5–1.4)
EOSINOPHIL # BLD AUTO: 0.02 K/UL (ref 0–0.51)
EOSINOPHIL NFR BLD: 0.5 % (ref 0–6.9)
ERYTHROCYTE [DISTWIDTH] IN BLOOD BY AUTOMATED COUNT: 47.9 FL (ref 35.9–50)
GFR SERPLBLD CREATININE-BSD FMLA CKD-EPI: 37 ML/MIN/1.73 M 2
GLOBULIN SER CALC-MCNC: 2.7 G/DL (ref 1.9–3.5)
GLUCOSE SERPL-MCNC: 97 MG/DL (ref 65–99)
GRAM STN SPEC: NORMAL
HCT VFR BLD AUTO: 22.9 % (ref 42–52)
HGB BLD-MCNC: 7.8 G/DL (ref 14–18)
IMM GRANULOCYTES # BLD AUTO: 0.02 K/UL (ref 0–0.11)
IMM GRANULOCYTES NFR BLD AUTO: 0.5 % (ref 0–0.9)
LYMPHOCYTES # BLD AUTO: 0.09 K/UL (ref 1–4.8)
LYMPHOCYTES NFR BLD: 2 % (ref 22–41)
MAGNESIUM SERPL-MCNC: 1.7 MG/DL (ref 1.5–2.5)
MCH RBC QN AUTO: 31.2 PG (ref 27–33)
MCHC RBC AUTO-ENTMCNC: 34.1 G/DL (ref 33.7–35.3)
MCV RBC AUTO: 91.6 FL (ref 81.4–97.8)
MONOCYTES # BLD AUTO: 0.1 K/UL (ref 0–0.85)
MONOCYTES NFR BLD AUTO: 2.3 % (ref 0–13.4)
NEUTROPHILS # BLD AUTO: 4.19 K/UL (ref 1.82–7.42)
NEUTROPHILS NFR BLD: 94.7 % (ref 44–72)
NRBC # BLD AUTO: 0 K/UL
NRBC BLD-RTO: 0 /100 WBC
PHOSPHATE SERPL-MCNC: 3.6 MG/DL (ref 2.5–4.5)
PLATELET # BLD AUTO: 57 K/UL (ref 164–446)
PMV BLD AUTO: 13.2 FL (ref 9–12.9)
POTASSIUM SERPL-SCNC: 2.9 MMOL/L (ref 3.6–5.5)
PROT SERPL-MCNC: 5.3 G/DL (ref 6–8.2)
RBC # BLD AUTO: 2.5 M/UL (ref 4.7–6.1)
SIGNIFICANT IND 70042: NORMAL
SITE SITE: NORMAL
SODIUM SERPL-SCNC: 144 MMOL/L (ref 135–145)
SOURCE SOURCE: NORMAL
WBC # BLD AUTO: 4.4 K/UL (ref 4.8–10.8)

## 2022-10-05 PROCEDURE — 700102 HCHG RX REV CODE 250 W/ 637 OVERRIDE(OP): Performed by: INTERNAL MEDICINE

## 2022-10-05 PROCEDURE — 700111 HCHG RX REV CODE 636 W/ 250 OVERRIDE (IP): Performed by: INTERNAL MEDICINE

## 2022-10-05 PROCEDURE — 700111 HCHG RX REV CODE 636 W/ 250 OVERRIDE (IP): Performed by: HOSPITALIST

## 2022-10-05 PROCEDURE — 700102 HCHG RX REV CODE 250 W/ 637 OVERRIDE(OP): Performed by: HOSPITALIST

## 2022-10-05 PROCEDURE — 99232 SBSQ HOSP IP/OBS MODERATE 35: CPT | Performed by: INTERNAL MEDICINE

## 2022-10-05 PROCEDURE — A9270 NON-COVERED ITEM OR SERVICE: HCPCS | Performed by: HOSPITALIST

## 2022-10-05 PROCEDURE — 80053 COMPREHEN METABOLIC PANEL: CPT

## 2022-10-05 PROCEDURE — 99233 SBSQ HOSP IP/OBS HIGH 50: CPT | Performed by: INTERNAL MEDICINE

## 2022-10-05 PROCEDURE — 84100 ASSAY OF PHOSPHORUS: CPT

## 2022-10-05 PROCEDURE — 85025 COMPLETE CBC W/AUTO DIFF WBC: CPT

## 2022-10-05 PROCEDURE — A9270 NON-COVERED ITEM OR SERVICE: HCPCS | Performed by: INTERNAL MEDICINE

## 2022-10-05 PROCEDURE — 99233 SBSQ HOSP IP/OBS HIGH 50: CPT | Performed by: HOSPITALIST

## 2022-10-05 PROCEDURE — 770020 HCHG ROOM/CARE - TELE (206)

## 2022-10-05 PROCEDURE — 83735 ASSAY OF MAGNESIUM: CPT

## 2022-10-05 PROCEDURE — 700105 HCHG RX REV CODE 258: Performed by: INTERNAL MEDICINE

## 2022-10-05 PROCEDURE — 94760 N-INVAS EAR/PLS OXIMETRY 1: CPT

## 2022-10-05 PROCEDURE — 94640 AIRWAY INHALATION TREATMENT: CPT

## 2022-10-05 RX ORDER — POTASSIUM CHLORIDE 20 MEQ/1
40 TABLET, EXTENDED RELEASE ORAL DAILY
Status: DISCONTINUED | OUTPATIENT
Start: 2022-10-06 | End: 2022-10-08

## 2022-10-05 RX ORDER — POTASSIUM CHLORIDE 20 MEQ/1
40 TABLET, EXTENDED RELEASE ORAL EVERY 6 HOURS
Status: COMPLETED | OUTPATIENT
Start: 2022-10-05 | End: 2022-10-05

## 2022-10-05 RX ORDER — POTASSIUM CHLORIDE 7.45 MG/ML
10 INJECTION INTRAVENOUS
Status: COMPLETED | OUTPATIENT
Start: 2022-10-05 | End: 2022-10-05

## 2022-10-05 RX ORDER — MAGNESIUM SULFATE HEPTAHYDRATE 40 MG/ML
2 INJECTION, SOLUTION INTRAVENOUS ONCE
Status: COMPLETED | OUTPATIENT
Start: 2022-10-05 | End: 2022-10-05

## 2022-10-05 RX ADMIN — PIPERACILLIN SODIUM AND TAZOBACTAM SODIUM 3.38 G: 3; .375 INJECTION, POWDER, FOR SOLUTION INTRAVENOUS at 14:22

## 2022-10-05 RX ADMIN — POTASSIUM CHLORIDE 40 MEQ: 1500 TABLET, EXTENDED RELEASE ORAL at 14:22

## 2022-10-05 RX ADMIN — POTASSIUM CHLORIDE 40 MEQ: 1500 TABLET, EXTENDED RELEASE ORAL at 09:34

## 2022-10-05 RX ADMIN — PIPERACILLIN SODIUM AND TAZOBACTAM SODIUM 3.38 G: 3; .375 INJECTION, POWDER, FOR SOLUTION INTRAVENOUS at 05:13

## 2022-10-05 RX ADMIN — POTASSIUM CHLORIDE 10 MEQ: 7.46 INJECTION, SOLUTION INTRAVENOUS at 10:00

## 2022-10-05 RX ADMIN — OXYMETAZOLINE HCL 2 SPRAY: 0.05 SPRAY NASAL at 18:00

## 2022-10-05 RX ADMIN — POTASSIUM CHLORIDE 10 MEQ: 7.46 INJECTION, SOLUTION INTRAVENOUS at 09:33

## 2022-10-05 RX ADMIN — POTASSIUM CHLORIDE 10 MEQ: 7.46 INJECTION, SOLUTION INTRAVENOUS at 12:50

## 2022-10-05 RX ADMIN — PIPERACILLIN SODIUM AND TAZOBACTAM SODIUM 3.38 G: 3; .375 INJECTION, POWDER, FOR SOLUTION INTRAVENOUS at 21:22

## 2022-10-05 RX ADMIN — OXYMETAZOLINE HCL 2 SPRAY: 0.05 SPRAY NASAL at 05:13

## 2022-10-05 RX ADMIN — ACYCLOVIR 400 MG: 400 TABLET ORAL at 05:17

## 2022-10-05 RX ADMIN — POTASSIUM CHLORIDE 10 MEQ: 7.46 INJECTION, SOLUTION INTRAVENOUS at 11:00

## 2022-10-05 RX ADMIN — FUROSEMIDE 40 MG: 10 INJECTION, SOLUTION INTRAMUSCULAR; INTRAVENOUS at 05:08

## 2022-10-05 RX ADMIN — MAGNESIUM SULFATE HEPTAHYDRATE 2 G: 40 INJECTION, SOLUTION INTRAVENOUS at 17:28

## 2022-10-05 ASSESSMENT — COGNITIVE AND FUNCTIONAL STATUS - GENERAL
CLIMB 3 TO 5 STEPS WITH RAILING: A LOT
MOBILITY SCORE: 14
SUGGESTED CMS G CODE MODIFIER MOBILITY: CL
DRESSING REGULAR LOWER BODY CLOTHING: A LOT
DRESSING REGULAR UPPER BODY CLOTHING: A LITTLE
SUGGESTED CMS G CODE MODIFIER DAILY ACTIVITY: CK
EATING MEALS: A LITTLE
MOVING TO AND FROM BED TO CHAIR: A LITTLE
WALKING IN HOSPITAL ROOM: A LOT
MOVING FROM LYING ON BACK TO SITTING ON SIDE OF FLAT BED: A LOT
DAILY ACTIVITIY SCORE: 16
TURNING FROM BACK TO SIDE WHILE IN FLAT BAD: A LITTLE
STANDING UP FROM CHAIR USING ARMS: A LOT
HELP NEEDED FOR BATHING: A LOT
TOILETING: A LITTLE
PERSONAL GROOMING: A LITTLE

## 2022-10-05 ASSESSMENT — ENCOUNTER SYMPTOMS
ORTHOPNEA: 0
VOMITING: 0
SPUTUM PRODUCTION: 1
BACK PAIN: 0
WHEEZING: 0
BLURRED VISION: 0
NAUSEA: 0
CHILLS: 0
HEMOPTYSIS: 0
COUGH: 1
SORE THROAT: 0
PALPITATIONS: 0
DIARRHEA: 0
DIZZINESS: 0
FEVER: 0
CARDIOVASCULAR NEGATIVE: 1
SHORTNESS OF BREATH: 1
PSYCHIATRIC NEGATIVE: 1
NECK PAIN: 0
LOSS OF CONSCIOUSNESS: 0
DOUBLE VISION: 0
SPUTUM PRODUCTION: 0
HEADACHES: 0
ABDOMINAL PAIN: 0

## 2022-10-05 NOTE — CARE PLAN
The patient is Watcher - Medium risk of patient condition declining or worsening    Shift Goals  Clinical Goals: Pt will remain free from falls, maintain o2 sat >89%.  Patient Goals: Rest and comfort  Family Goals:     Progress made toward(s) clinical / shift goals:  Pt remains free from falls    Patient is not progressing towards the following goals: Maintaining O2 sat >89%, episode of desatting with ambulation.

## 2022-10-05 NOTE — PROGRESS NOTES
Infectious Disease Progress Note    Author: Lucía Izquierdo M.D. Date & Time of service: 10/5/2022  1:07 PM    Chief Complaint:  Prior COVID  Hypoxemic resp failure  NSTEMI    Interval History:  72 y.o. 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 He was treated with decadron, doxycycline, lasix and ID previous eval added remdesevir on 2022.  All cultures and f/u viral studies have been otherwise negative CT scan done 10/1 showed pneumomediastinum, PTX and ext groundglass opacities +mets   10/ AF WBC 4.1 remains on HFNC some dry cough-feels a little better-tolerating some PO. Creat worse  Labs Reviewed, Medications Reviewed, and Radiology Reviewed.    Review of Systems:  Review of Systems   Constitutional:  Negative for fever and malaise/fatigue.   Respiratory:  Positive for cough and shortness of breath. Negative for hemoptysis, sputum production and wheezing.         Decreased dyspnea   All other systems reviewed and are negative.    Hemodynamics:  Temp (24hrs), Av.7 °C (98 °F), Min:36.6 °C (97.9 °F), Max:36.8 °C (98.2 °F)  Temperature: 36.6 °C (97.9 °F)  Pulse  Av  Min: 58  Max: 107   Blood Pressure : (!) 98/52      Physical Exam:  Physical Exam  Vitals and nursing note reviewed.   Constitutional:       General: He is not in acute distress.     Appearance: He is ill-appearing. He is not toxic-appearing or diaphoretic.   HENT:      Nose: No rhinorrhea.   Eyes:      General: No scleral icterus.     Extraocular Movements: Extraocular movements intact.      Pupils: Pupils are equal, round, and reactive to light.   Cardiovascular:      Rate and Rhythm: Normal rate. Rhythm irregular.   Pulmonary:      Effort: Pulmonary effort is normal. No respiratory distress.      Breath sounds: No stridor. Rhonchi present. No wheezing.   Abdominal:      General: There is no distension.      Palpations: Abdomen is soft.      Tenderness: There is no abdominal tenderness.   Musculoskeletal:       Cervical back: Neck supple.   Lymphadenopathy:      Cervical: No cervical adenopathy.   Skin:     Coloration: Skin is pale. Skin is not jaundiced.      Findings: Bruising and lesion present.   Neurological:      General: No focal deficit present.      Mental Status: He is alert and oriented to person, place, and time.   Psychiatric:         Mood and Affect: Mood normal.         Behavior: Behavior normal.       Meds:    Current Facility-Administered Medications:     potassium chloride SA    potassium chloride    [START ON 10/6/2022] potassium chloride SA    magnesium sulfate    influenza Vac High-Dose Quad    [COMPLETED] piperacillin-tazobactam **AND** piperacillin-tazobactam    sodium chloride    oxymetazoline    [Held by provider] enoxaparin (LOVENOX) injection    furosemide    menthol    guaiFENesin dextromethorphan    lidocaine    hydrALAZINE    acyclovir    senna-docusate **AND** polyethylene glycol/lytes **AND** magnesium hydroxide **AND** bisacodyl    Labs:  Recent Labs     10/03/22  0218 10/04/22  0300 10/05/22  0515   WBC 6.1 4.1* 4.4*   RBC 2.72* 2.49* 2.50*   HEMOGLOBIN 8.5* 7.7* 7.8*   HEMATOCRIT 27.0* 23.1* 22.9*   MCV 99.3* 92.8 91.6   MCH 31.3 30.9 31.2   RDW 51.8* 48.5 47.9   PLATELETCT 56* 44* 57*   MPV 12.0 12.7 13.2*   NEUTSPOLYS 95.40* 95.00* 94.70*   LYMPHOCYTES 1.00* 1.20* 2.00*   MONOCYTES 2.50 2.40 2.30   EOSINOPHILS 0.30 0.70 0.50   BASOPHILS 0.00 0.20 0.00       Recent Labs     10/03/22  0218 10/04/22  0300 10/05/22  0515   SODIUM 139 141 144   POTASSIUM 3.6 3.2* 2.9*   CHLORIDE 104 105 108   CO2 20 21 20   GLUCOSE 95 85 97   BUN 67* 59* 51*       Recent Labs     10/03/22  0218 10/04/22  0300 10/05/22  0515   ALBUMIN 2.6* 2.7* 2.6*   TBILIRUBIN 0.8 0.7 0.7   ALKPHOSPHAT 376* 294* 269*   TOTPROTEIN 4.7* 4.7* 5.3*   ALTSGPT 53* 35 30   ASTSGOT 20 13 11*   CREATININE 1.82* 2.13* 1.88*         Imaging:  CT-CHEST (THORAX) W/O    Result Date: 10/1/2022  10/1/2022 4:27 PM HISTORY/REASON FOR  EXAM:  Dyspnea, chronic, unclear etiology. TECHNIQUE/EXAM DESCRIPTION: CT scan of the chest without contrast. Noncontrast helical scanning of the chest was obtained from the lung apices through the adrenal glands. Low dose optimization technique was utilized for this CT exam including automated exposure control and adjustment of the mA and/or kV according to patient size. COMPARISON: CT TAVR 9/23/2022 FINDINGS: Lungs: Extensive pulmonary ground glass opacities with more dependent consolidative opacities. Mediastinum/Alexandra: No significant adenopathy. Pneumomediastinum is new from prior CT. Pleura: No pleural effusion. Tiny right pneumothorax. Cardiac: Heart size is stable without pericardial effusion. Vascular: Severe atherosclerosis. Aortic valve calcification. Right PICC line terminates in the proximal right atrium. Soft tissues: Air within the soft tissues of the right neck and chest. Bones: There is asymmetric sclerosis seen within the T11 and T12 levels involving the right pedicles and lamina. Given the focal nature this raises suspicion for metastatic disease..     1.  Extensive pneumomediastinum with air within the soft tissues of the right neck and chest. 2.  Small right pneumothorax. 3.  Patchy extensive bilateral groundglass opacities with more dependent consolidative opacities are nonspecific, Covid Pneumonia versus other infectious/inflammatory etiology 4.  Sclerotic lesions in the right T11 and T12 levels in keeping with sclerotic osseous metastases These findings were discussed with PROSPER BOWMAN III on 10/1/2022 4:49 PM. Fleischner Society pulmonary nodule recommendations: Not Applicable     CT-HEAD W/O    Result Date: 9/21/2022  HISTORY/REASON FOR EXAM: Delirium; altered. altered TECHNIQUE/EXAM DESCRIPTION: CT scan of the brain, 9/21/2022 11:44 AM. This examination was conducted with Automated Exposure Control and Automated Adjustment of Tube Current based on patient size. Multiple noncontrast axial  images of the head were performed from the skull base through the high parietal convexities. COMPARISON: None. TOTAL DLP: 7:15 mGy*cm FINDINGS: There are prominent atherosclerotic calcifications of intracerebral vertebral arteries, as well as intracerebral internal carotid arteries. There is mild diffuse decreased attenuation noted throughout the periventricular deep white matter of the brain.  This finding is suggestive of chronic demyelination resulting from chronic microvascular atherosclerotic disease. I do not see evidence of brain mass. I do not see evidence of brain hemorrhage or abnormal intra-axial fluid collection There is mild prominence of ventricles and sulcations, compatible generalized atrophy. Note is made of opacification of several ethmoid air cells, as well as mucoperiosteal thickening within the maxillary sinuses, compatible with sinusitis.     I do not see evidence of acute brain injury although there is evidence of white matter degenerative changes, as well as atherosclerotic disease of the cerebral vasculature. There is evidence of ethmoid and maxillary sinusitis. DLP Reporting Thresholds for Incorrect/Repeated Exams - DLP in mGy*cm Head/Neck:  0-year-old 3840, 1-year-old 5880, 5-year-old 8770, 10-year-old 17720 and adult 37421 Head:  0-year-old 4540, 1-year-old 7460, 5-year-old 23633, 10-year-old 76088 and adult 47970 Neck:  0-year-old 2940, 1-year-old 4160, 5-year-old 4550, 10-year-old 6320 and adult 8470 Chest:  0-year-old 550, 1-year-old 830, 5-year-old 1200, 10-year-old 3840 and adult 3570 Abd/pelvis:  0-year-old 440, 1-year-old 720, 5-year-old 1080, 10-year-old 3330 and adult 3330 Trunk(C/A/P):  0-year-old 490, 1-year-old 770, 5-year-old 1140, 10-year-old 3570 and adult 3330    DX-CHEST-LIMITED (1 VIEW)    Result Date: 9/22/2022 9/22/2022 8:55 AM HISTORY/REASON FOR EXAM:  Abnormal finding of lung field TECHNIQUE/EXAM DESCRIPTION AND NUMBER OF VIEWS: Single portable view of the chest.  COMPARISON: None FINDINGS: Right side PICC line with tip projecting at the cavoatrial junction. HEART: Not enlarged. There is atherosclerotic calcification in the aortic arch. LUNGS: Patchy bilateral airspace opacities. PLEURA: No effusion or pneumothorax.     1.  Multifocal pneumonia. Pulmonary edema could have a similar appearance.    DX-CHEST-PORTABLE (1 VIEW)    Result Date: 10/4/2022    10/4/2022 1:40 AM HISTORY/REASON FOR EXAM: Shortness of Breath TECHNIQUE/EXAM DESCRIPTION:  Single AP view of the chest. COMPARISON: Yesterday FINDINGS: Position of medical devices appears stable. The cardiac silhouette appears within normal limits. Atherosclerotic calcification of the aorta is noted.  The central  pulmonary vasculature appears prominent and indistinct. Bilateral lung volumes are diminished.  Diffuse scattered hazy pulmonary parenchymal opacities are seen. No significant pleural effusions are identified. The bony structures appear age-appropriate.  Soft tissue gas in the bilateral chest wall and neck is seen, increased since prior study.     1.  Pulmonary edema and/or infiltrates are identified, which are stable since the prior exam. 2.  Soft tissue gas in the chest wall and neck, increased on the left since prior study.. 3.  Atherosclerosis    DX-CHEST-PORTABLE (1 VIEW)    Result Date: 10/3/2022    10/3/2022 12:40 AM HISTORY/REASON FOR EXAM: Shortness of Breath TECHNIQUE/EXAM DESCRIPTION:  Single AP view of the chest. COMPARISON: Yesterday FINDINGS: Position of medical devices appears stable. The cardiac silhouette appears within normal limits. Atherosclerotic calcification of the aorta is noted.  The central  pulmonary vasculature appears prominent and indistinct. Bilateral lung volumes are diminished.  Diffuse scattered hazy pulmonary parenchymal opacities are seen. No significant pleural effusions are identified. The bony structures appear age-appropriate.  Soft tissue gas in the right chest wall and neck is  seen.     1.  Pulmonary edema and/or infiltrates are identified, which are stable since the prior exam. 2.  Right chest wall and neck soft tissue gas. 3.  Atherosclerosis    DX-CHEST-PORTABLE (1 VIEW)    Result Date: 10/2/2022  10/2/2022 6:06 AM HISTORY/REASON FOR EXAM:  Shortness of Breath TECHNIQUE/EXAM DESCRIPTION AND NUMBER OF VIEWS: Single portable view of the chest. COMPARISON: Yesterday FINDINGS: Hardware is stably positioned in its visualized extent. RIGHT chest wall and neck base gas is redemonstrated. HEART: Stable size. Pneumomediastinum is redemonstrated. LUNGS: Pulmonary opacities are unchanged. PLEURA: No effusion or pneumothorax.     1.  No visible pneumothorax. This could be obscured by the patient's pneumomediastinum and soft tissue gas. 2.  Unchanged BILATERAL pulmonary opacities    DX-CHEST-PORTABLE (1 VIEW)    Result Date: 10/1/2022  10/1/2022 8:29 PM HISTORY/REASON FOR EXAM:  RIGHT pneumothorax TECHNIQUE/EXAM DESCRIPTION AND NUMBER OF VIEWS: Single portable view of the chest. COMPARISON: Study from earlier the same date at 2:18 PM FINDINGS: Hardware is stably positioned in its visualized extent. RIGHT chest wall and neck base gas is redemonstrated. HEART: Stable size. Pneumomediastinum is redemonstrated. LUNGS: Pulmonary opacities are unchanged. PLEURA: No effusion or pneumothorax.     1.  No visible pneumothorax. This could be obscured by the patient's pneumomediastinum and soft tissue gas. 2.  Unchanged BILATERAL pulmonary opacities    DX-CHEST-PORTABLE (1 VIEW)    Result Date: 10/1/2022  10/1/2022 2:36 PM HISTORY/REASON FOR EXAM:  Shortness of Breath TECHNIQUE/EXAM DESCRIPTION AND NUMBER OF VIEWS: Single portable view of the chest. COMPARISON: 9/26/2022 FINDINGS: The right IJ [place]-Frannie catheter is been removed. There is a right PICC line in place with the tip projecting over the right atrium. There is right-sided subcutaneous air extending into the neck. Question of a small amount of  mediastinal air. The mediastinal and cardiac silhouette is unchanged in size. The pulmonary vascularity is within normal limits. Diffuse bilateral interstitial and airspace opacities are again seen slightly progressed since the prior study in the lower lung zones. There is no significant pleural effusion. There is a small right apical pneumothorax. Bones are unchanged.     1.  There is a new small right apical pneumothorax with new right-sided subcutaneous air extending into the neck. Question of a small amount of mediastinal air. 2.  Interval increase in patchy lower lobe opacities which could be atelectasis or pneumonitis or edema. 3.  Findings were discussed with Eleno, the patient's nurse at 2:58 PM on 10/1/2022 on 10/1/2022 2:58 PM. He states he will give this message to the physician taking care of the patient.    DX-CHEST-PORTABLE (1 VIEW)    Result Date: 9/26/2022 9/26/2022 10:09 AM HISTORY/REASON FOR EXAM:  [place] placement. TECHNIQUE/EXAM DESCRIPTION AND NUMBER OF VIEWS: Single portable view of the chest. COMPARISON: One day prior FINDINGS: Stable right PICC line. A right internal jugular [place]-Frannie catheter tip projects in the distal right main pulmonary artery. Cardiomediastinal silhouette is stable. Aortic calcified atherosclerotic plaque. Interstitial and hazy pulmonary opacities appear mildly worsened. No significant pleural effusion or pneumothorax. No acute osseous abnormality.     1.  Right-sided [place]-Frannie catheter tip projects in the distal right main pulmonary artery region. No pneumothorax. 2.  Mild interval worsening in hazy bilateral pulmonary opacities.     DX-CHEST-PORTABLE (1 VIEW)    Result Date: 9/25/2022 9/25/2022 11:25 AM HISTORY/REASON FOR EXAM:  central line/swan. TECHNIQUE/EXAM DESCRIPTION AND NUMBER OF VIEWS: Single portable view of the chest. COMPARISON: One day prior FINDINGS: Stable right PICC line. A right internal jugular [place]-Frannie catheter tip projects in the distal right main  pulmonary artery. Cardiomediastinal silhouette is stable. Aortic calcified atherosclerotic plaque. Interstitial and hazy pulmonary opacities appear slightly improved. No significant pleural effusion or pneumothorax. No acute osseous abnormality.     1.  Right-sided [place]-Frannie catheter tip projects in the distal right main pulmonary artery region. No pneumothorax. 2.  Probable slight improvement in hazy bilateral pulmonary opacities.    DX-CHEST-PORTABLE (1 VIEW)    Result Date: 9/24/2022 9/24/2022 9:30 AM HISTORY/REASON FOR EXAM:  Shortness of Breath. TECHNIQUE/EXAM DESCRIPTION AND NUMBER OF VIEWS: Single portable view of the chest. COMPARISON: Test x-ray 9/23/2022 FINDINGS: Right-sided PICC line with distal tip at the caval atrial junction. Heart size is within normal limits. Increased interstitial markings throughout the lung fields especially in the perihilar regions No pleural abnormalities are noted.     1.  Right-sided PICC line unchanged in position. 2.  Increased interstitial markings throughout the lung fields especially in the perihilar regions consistent with pulmonary edema and/or pneumonitis.    DX-CHEST-PORTABLE (1 VIEW)    Result Date: 9/23/2022 9/23/2022 2:55 PM HISTORY/REASON FOR EXAM:  Shortness of Breath TECHNIQUE/EXAM DESCRIPTION AND NUMBER OF VIEWS: Single portable view of the chest. COMPARISON: Yesterday FINDINGS: Hardware is stably positioned in its visualized extent. HEART: Stable size. LUNGS: BILATERAL pulmonary opacities relatively sparing the LEFT upper lobe are not appreciably changed. PLEURA: No effusion or pneumothorax.     Unchanged BILATERAL pneumonia    DX-CHEST-PORTABLE (1 VIEW)    Result Date: 9/21/2022  HISTORY/REASON FOR EXAM: ALTERED LEVEL OF CONSCIOUSNESS; LOSS OF APPETITE. TECHNIQUE/EXAM DESCRIPTION: Chest x-ray, one portable view, 9/21/2022 11:44 AM. COMPARISON: 9/13/2022 FINDINGS: Prominent areas of infiltrative developed involving bases and mid portions of right and left  lungs, compatible with severe bilateral pneumonia. Right-sided PICC line appears in stable position. Heart is normal in size.     There is evidence of interval development of severe bilateral pneumonia.    DX-CHEST-PORTABLE (1 VIEW)    Result Date: 9/13/2022  HISTORY/REASON FOR EXAM: cough. TECHNIQUE/EXAM DESCRIPTION: Chest x-ray, one portable view, 9/13/2022 11:29 AM. COMPARISON: 9/4/2022 FINDINGS: Slight elevation of left hemidiaphragm is again noted, with left basilar atelectasis appearing stable. Questionable increasing density at the right lung base may warrant follow-up chest radiograph, to exclude developing pneumonia if clinically relevant. PICC line remain in stable position. Heart is normal in size.     Equivocal ill-defined increasing density right lung base is noted. Consideration may be given to follow-up chest radiograph to exclude developing process.    US-RUQ    Result Date: 9/22/2022 9/22/2022 7:35 AM HISTORY/REASON FOR EXAM:  Abnormal Labs Abdominal pain TECHNIQUE/EXAM DESCRIPTION AND NUMBER OF VIEWS:  Real-time sonography of the liver and biliary tree. COMPARISON: None FINDINGS: The liver is normal in contour. There is no evidence of solid mass lesion. The liver measures 13.32 cm. The gallbladder is normal. There is no evidence of cholelithiasis. The gallbladder wall thickness measures 2.50 mm. There is no pericholecystic fluid. The common duct measures 4.60 mm. The visualized pancreas is unremarkable. The visualized aorta is normal in caliber. Intrahepatic IVC is patent. The portal vein is patent with hepatopetal flow. The MPV measures 0.90 cm. The right kidney measures 10.26 cm. There is no ascites.     Unremarkable RIGHT upper quadrant ultrasound    US-RENAL    Result Date: 9/14/2022  HISTORY/REASON FOR EXAM:  Abnormal Labs. TECHNIQUE/EXAM DESCRIPTION: Renal ultrasound, 9/14/2022 2:17 PM. COMPARISON: 2/28/2022 FINDINGS: Right Kidney: The right kidney measures 8.7 x 5.3 x 5.5 cm. The renal  cortex is normal in thickness and echogenicity. There is no hydronephrosis. There is no solid mass identified. There is no cystic lesion identified. There are no renal calculi identified. Left Kidney: The left kidney measures 8.7 x 4.5 x 4.0 cm.  The renal cortex is normal in thickness and echogenicity. There is no hydronephrosis. There is no solid mass identified. There is a 1.5 cm cyst midpole lateral aspect left kidney. No change. There are no renal calculi identified. Bladder: The bladder is normal. Patient unable to void.     Small stable left renal cyst. Altaf Perez MD 2022 3:48 PM    EC-ECHOCARDIOGRAM COMPLETE W/O CONT    Result Date: 2022  Transthoracic Echo Report Echocardiography Laboratory CONCLUSIONS No prior study is available for comparison. Reduced left ventricular systolic function. The left ventricular ejection fraction is visually estimated to be 20%. Hypokinesis of the inferior and inferolateral segments. Cannot rule out low flow low gradient severe aortic stenosis. Mild eccentric aortic insufficiency. SUSSY MORA Exam Date:         2022                    12:47 Exam Location:     Inpatient Priority:          Routine Ordering Physician:        SCOOBY RODRIGUEZ Referring Physician:       SCOOBY RODRIGUEZ Sonographer:               Basilio Bates LIZBETH                            [facility] Age:    72     Gender:    M MRN:    [id number] :    1950 BSA:    1.86   Ht (in):    69     Wt (lb):    156 Exam Type:     Complete Indications:     Other chest pain ICD Codes:       R07.89 CPT Codes:       54626 BP:   103    /   53     HR:   75 Technical Quality:       Good MEASUREMENTS  (Male / Female) Normal Values 2D ECHO LV Diastolic Diameter PLAX        4.6 cm                4.2 - 5.9 / 3.9 - 5.3 cm LV Systolic Diameter PLAX         2.9 cm                2.1 - 4.0 cm IVS Diastolic Thickness           0.88 cm               LVPW Diastolic Thickness          0.99 cm               LVOT  Diameter                     2 cm                  Estimated LV Ejection Fraction    20 %                  IVC Diameter                      1.2 cm                DOPPLER AV Peak Velocity                  2.6 m/s               AV Peak Gradient                  27.9 mmHg             AV Mean Gradient                  17.3 mmHg             LVOT Peak Velocity                0.97 m/s              AV Area Cont Eq vti               1 cm2                 Mitral E Point Velocity           0.78 m/s              Mitral E to A Ratio               0.66                  Mitral A Duration                 125 ms                MV Pressure Half Time             50.9 ms               MV Area PHT                       4.3 cm2               MV Deceleration Time              176 ms                RVOT Peak Velocity                0.83 m/s              * Indicates values subject to auto-interpretation LV EF:  20    % FINDINGS Left Ventricle Sigmoid septum noted measuring 1.1 cm in diameter without evidence of hemodynamic compromise. The left ventricle is normal in size. Reduced left ventricular systolic function. The left ventricular ejection fraction is visually estimated to be 20%. Hypokinesis of the inferior and inferolateral segments. Normal diastolic function. Right Ventricle Normal right ventricular size and systolic function. Right Atrium The right atrium is not well visualized. Normal inferior vena cava size and inspiratory collapse. Left Atrium The left atrium is normal in size. Left atrial volume index is 22 mL/sq m. Mitral Valve Structurally normal mitral valve without significant stenosis or regurgitation. Aortic Valve Cannot rule out low flow low gradient severe aortic stenosis. Transvalvular gradients are - Peak: 28 mmHg, Mean: 18 mmHg. Vmax is 2.63 m/s. Aortic valve area calculated from the continuity equation is 1 cm2. Mild eccentric aortic insufficiency. Tricuspid Valve Structurally normal tricuspid valve without  significant stenosis or regurgitation. Unable to estimate right ventricular systolic pressure due to an inadequate tricuspid regurgitant jet. Pulmonic Valve Structurally normal pulmonic valve without significant stenosis or regurgitation. Pericardium Normal pericardium without effusion. Aorta Normal aortic root for body surface area. The ascending aorta diameter is not well visualized. Maryann DAMON To (Electronically Signed) Final Date:     22 September 2022                 14:21    CT-TAVR CHEST-ABD-PELVIS W/WO POST PROCESS    Result Date: 9/27/2022 9/23/2022 1:46 PM HISTORY/REASON FOR EXAM:  Aortic stenosis, severe, symptomatic, low EF; without contrast. Please obtain aortic valve score. Renal insufficiency TECHNIQUE/EXAM DESCRIPTION AND NUMBER OF VIEWS: Noncontrast CT chest abdomen and pelvis is obtained from above the clavicles through the pelvic floor. Thin axial slices were obtained and coronal and sagittal reformations were then generated from the axial data set. Oblique reformations were also created. Agatston score of the aortic valve was generated COMPARISON: None FINDINGS: The study is severely limited for pre-TAVR preparation due to lack of intravenous contrast. This prevents characterization of the lumen diameter is that could be affected by soft plaque. Measurements provided here in July on the diameter between regions of calcified plaque and therefore will overestimate in the context of soft plaque The tricuspid aortic valve is heavily calcified with an Agatston score of 4565. The annulus is estimated at approximately 450 sq mm but again this is imprecise without contrast The vertical distance to the left coronary artery origin is 12 mm, the right is 15 and the coronary arteries are densely calcified. There is severe atherosclerosis throughout the evaluation with circumferential involvement in many levels. The ascending aorta maximal diameter is 34 mm. The descending thoracic aorta maximal  diameter is 30 mm. At the level of the diaphragm the aorta measures 25 mm. The infrarenal aorta measures 20 mm. Severe calcified plaque in the iliofemoral runoff with the right common iliac artery narrowed to 2 mm. The left is less affected with minimal short axis diameter 5.6 mm in the common iliac, 3.9 mm external iliac levels. Again this is an over estimation due to absence of intravenous contrast. There is no circumferential calcified plaque at the common femoral levels but there is circumferential calcified plaque at the distal aorta just above the bifurcation where the lumen measures 10 x 9 mm. Miscellaneous thorax CT findings: There is multifocal groundglass with intervening areas of normal-appearing parenchyma. There is bronchial thickening. There is some dependent lower lobe consolidation and subpleural linear opacities likely indicating some interstitial lung disease and scarring. Trace pleural fluid Emphysematous change Moderate left heart enlargement Mild mediastinal and hilar adenopathy Right PICC line terminates in good position at the cavoatrial junction Miscellaneous abdomen and pelvis findings: Moderate colonic diverticulosis. Mild left renal atrophy.     1.  Limited by lack of intravenous contrast 2.  Aortic valve Agatston score 4565 and the annulus measures approximately 450 sq mm 3.  Coronary arteries originate over a centimeter above the annulus 4.  Severe atherosclerotic change with marked narrowing of the iliofemoral runoff, right worse than left 5.  Multifocal groundglass greater than bronchial thickening is compatible with known Covid pneumonia. There is also evidence of lower lung zone scarring, possible interstitial lung disease and small pleural fluid 6.  Colonic diverticulosis, emphysema, left heart enlargement      Micro:  Results       Procedure Component Value Units Date/Time    CULTURE RESPIRATORY W/ GRM STN [[id number]] Collected: 10/03/22 1036    Order Status: Completed Specimen:  Respirate from Sputum Updated: 10/05/22 1015     Significant Indicator NEG     Source RESP     Site SPUTUM     Culture Result Light growth usual upper respiratory kinsey  including yeast.  No clinically significant Staphylococcus aureus, Methicillin  Resistant Staphylococcus aureus, or Pseudomonas species  isolated.       Gram Stain Result Few WBCs.  Moderate mixed bacteria, no predominant organism seen.  Few Yeast.  Specimen Quality Score: 1+      Narrative:      Collected By: Ash MENENDEZ  Collected By: Ash MENENDEZ    AFB Culture [[id number]] Collected: 10/03/22 1036    Order Status: Completed Specimen: Respirate Updated: 10/05/22 1015     Significant Indicator NEG     Source RESP     Site SPUTUM     Culture Result Culture in progress.     AFB Smear Results No acid fast bacilli seen.    Narrative:      Collected By: Ahs MENENDEZ  Collected By: Ash MENENDEZ    GRAM STAIN [[id number]] Collected: 10/03/22 1036    Order Status: Completed Specimen: Respirate Updated: 10/04/22 1604     Significant Indicator .     Source RESP     Site SPUTUM     Gram Stain Result Few WBCs.  Moderate mixed bacteria, no predominant organism seen.  Few Yeast.  Specimen Quality Score: 1+      Narrative:      Collected By: [id number] MENENDEZ  Collected By: [id number] MENENDEZ    Acid Fast Stain [[id number]] Collected: 10/03/22 1036    Order Status: Completed Specimen: Respirate Updated: 10/04/22 1604     Significant Indicator NEG     Source RESP     Site SPUTUM     AFB Smear Results No acid fast bacilli seen.    Narrative:      Collected By: Ash MENENDEZ  Collected By: Ash MENENDEZ    MTB/RIF Resistance (Mycobacterium tuberculosis) [[id number]] Collected: 10/03/22 1036    Order Status: Completed Updated: 10/04/22 1420     MTB complex DNA, by PCR NOT DETECTED    Narrative:      Collected By: Ash MENENDEZ    Fungal Culture [[id number]] Collected: 10/02/22 1740    Order Status: Completed  Specimen: Respirate from Sputum Updated: 10/04/22 1123     Significant Indicator NEG     Source RESP     Site SPUTUM     Culture Result No clinically significant fungal growth to date.     Gram Stain Result Few WBCs.  Many Gram positive cocci.  Sputum Gram stain quality score is  <1, probable  oropharyngeal contamination. Culture not performed.  Recollect if clinically indicated.       Fungal Smear Results No fungal elements seen.    Narrative:      Collected By: [id number] MARY PRO.  Collected By: [id number] MARY BERGMAN    GRAM STAIN [[id number]] Collected: 10/02/22 1740    Order Status: Completed Specimen: Respirate Updated: 10/03/22 1658     Significant Indicator .     Source RESP     Site SPUTUM     Gram Stain Result Few WBCs.  Many Gram positive cocci.  Sputum Gram stain quality score is  <1, probable  oropharyngeal contamination. Culture not performed.  Recollect if clinically indicated.      Narrative:      Collected By: [id number] MARY PRO.  Collected By: [id number] MARY BERGMAN    Fungal Smear [[id number]] Collected: 10/02/22 1740    Order Status: Completed Specimen: Respirate Updated: 10/03/22 1658     Significant Indicator NEG     Source RESP     Site SPUTUM     Fungal Smear Results No fungal elements seen.    Narrative:      Collected By: [id number] MARY BERGMAN  Collected By: [id number] MARY BERGMAN    MRSA By PCR (Amp) [[id number]] Collected: 10/02/22 1740    Order Status: Completed Specimen: Respirate from Nares Updated: 10/02/22 2005     MRSA by PCR Negative    Narrative:      Collected By: [id number] MARY BERGMAN    Pneumocystis DFA [[id number]] Collected: 10/02/22 1841    Order Status: Sent Specimen: Respirate from Sputum     Respiratory Panel By PCR [[id number]]  (Abnormal) Collected: 10/02/22 1039    Order Status: Completed Specimen: Nasopharyngeal Updated: 10/02/22 1805     Adenovirus, PCR Not Detected     SARS-CoV-2 (COVID-19) RNA by ROHIT DETECTED     Comment: PATIENTS: Important  "information regarding your results and instructions can  be found at [email or web address]   \"After your  Covid-19 Test\"    **The [employer] Respiratory Panel 2.1 (RP2.1) RT-PCR test is FDA authorized.          Coronavirus 229E, PCR Not Detected     Coronavirus HKU1, PCR Not Detected     Coronavirus NL63, PCR Not Detected     Coronavirus OC43, PCR Not Detected     Human Metapneumovirus, PCR Not Detected     Rhino/Enterovirus, PCR Not Detected     Influenza A, PCR Not Detected     Influenza B, PCR Not Detected     Parainfluenza 1, PCR Not Detected     Parainfluenza 2, PCR Not Detected     Parainfluenza 3, PCR Not Detected     Parainfluenza 4, PCR Not Detected     RSV (Respiratory Syncytial Virus), PCR Not Detected     Bordetella parapertussis (KY5912), PCR Not Detected     Bordetella pertussis (ptxP), PCR Not Detected     Mycoplasma pneumoniae, PCR Not Detected     Chlamydia pneumoniae, PCR Not Detected    Narrative:      UTILIZATION ALERT: Has Flu/RSV/COVID PCR testing been  performed, resulted reviewed, and consulted with Infectious  Diseases or PICU Provider Teams?->Yes  Have you been in close contact with a person who is suspected  or known to be positive for COVID-19 within the last 30 days  (e.g. last seen that person < 30 days ago)->No    CULTURE RESPIRATORY W/ GRM STN [[id number]] Collected: 10/02/22 1740    Order Status: Canceled Specimen: Sputum     AFB Culture [[id number]] Collected: 10/02/22 1740    Order Status: Canceled Specimen: Sputum     MTB/RIF Resistance (Mycobacterium tuberculosis) [[id number]] Collected: 10/02/22 1740    Order Status: Canceled     COV-2, FLU A/B, AND RSV BY PCR (2-4 HOURS CEPHEID): Collect NP swab in VTM [[id number]]  (Abnormal) Collected: 10/02/22 1039    Order Status: Completed Specimen: Respirate from Nasopharyngeal Updated: 10/02/22 1312     Influenza virus A RNA Negative     Influenza virus B, PCR Negative     RSV, PCR Negative     SARS-CoV-2 by PCR " "DETECTED     Comment: PATIENTS: Important information regarding your results and instructions can  be found at [email or web address]   \"After your  Covid-19 Test\"    **The [employer] Gene[employer]pert Xpress SARS-CoV-2 RT-PCR Test has been made  available for use under the Emergency Use Authorization (EUA) only.          SARS-CoV-2 Source NP Swab    Narrative:      Collected By: [id number] MARY BERGMAN            Assessment:  Active Hospital Problems    Diagnosis     *Acute respiratory failure with hypoxia ([facility]) [J96.01]     Hemoptysis [R04.2]     Secondary spontaneous pneumothorax [J93.12]     Heart failure with reduced ejection fraction ([facility]) [I50.20]     NSTEMI (non-ST elevated myocardial infarction) ([facility]) [I21.4]     COVID [U07.1]     Low bicarbonate [E87.8]     Hypokalemia [E87.6]     Pancytopenia ([facility]) [D61.818]     CKD (chronic kidney disease) [N18.9]     Elevated brain natriuretic peptide (BNP) level [R79.89]     Elevated liver enzymes [R74.8]     Advance care planning [Z71.89]     Follicular lymphoma grade I ([facility]) [C82.00]     Essential hypertension, benign [I10]      Extensive pneumomediastinum with air within the soft tissues of the right neck and chest.  Small right pneumothorax.  Patchy extensive bilateral groundglass opacities-infectious vs noninfectious  Sclerotic osseous metastases  Marked elevation BNP with known EF 20%  Not neutropenic  No fever  Thrombocytopenia  BRADLEY improved     PLAN:   Antibiotics empirically expanded for HCAP -adjust dose for renal function  Complete 7 day course  Pneumocystitis pending-Fungitell neg  Fungal serologies neg  CMV neg  Legionella neg  S pneumo neg  Repeat BNP improved to 2246    Will sign off-please reconsult if needed      "

## 2022-10-05 NOTE — PROGRESS NOTES
"Pulmonary Progress Note    Date of admission  9/21/2022    Chief Complaint  72 y.o. male admitted 9/21/2022 with [facility] Course    \" 72-year-old male with a history of recent BRADLEY and COVID-19 infection July, again PCR positive in September, hypertension, aortic stenosis and follicular lymphoma on chemo/immunotherapy with Bendamustine and Obinutuzumab. He gets his oncologic care in [place] at [facility].  He was admitted on 9/21 with an NSTEMI and worsening shortness of breath with an echocardiogram showing ejection fraction of 20% with inferolateral hypokinesis.  He was admitted to the ICU, a PA catheter was placed and he was started on diuretics underwent evaluation for possible TAVR.  Also started on Decadron, doxycycline, and remdesivir for persistent vs reinfection with COVID. On Acyclovir for ppx. In the ICU was on high flow nasal cannula.      No reference to lung parenchymal disease on CT abd/pelvis at [facility] in 4/2022    CXR due to persistent hypoxia was performed 10/1 showed an incidental right apical pneumothorax with right-sided subcutaneous air extending into the neck as well as patchy bilateral alveolar opacities    CT of the chest 10/1 shows extensive pneumomediastinum with small right apical pneumothorax, persistent unchanged patchy bilateral groundglass opacities\"   -From Dr Hutchins's note     10/3/22 -patient continues to be on high flow nasal cannula, up to 40 L at 80%  10/4 -patient reports overall he is improving, he is on high flow nasal cannula 30 L at 50%.  Overall, patient is feeling better.  He states that he has less short of breath and his nosebleed has improved somewhat.  He states that he was using his incentive spirometer, but has not been using it very much in the last day or so.  He endorses that he has not really gotten out of bed yet.    Interval Problem Update  Reviewed last 24 hour events:  PTX stable on CXR on yesterday's film  HFNC " 30L/50%  Nosebleeding improved    Review of Systems  Review of Systems   Constitutional:  Positive for malaise/fatigue.   HENT:  Positive for congestion and nosebleeds.    Respiratory:  Positive for cough.    Cardiovascular: Negative.    Psychiatric/Behavioral: Negative.     All other systems reviewed and are negative.     Vital Signs for last 24 hours   Temp:  [36.4 °C (97.5 °F)-36.8 °C (98.2 °F)] 36.4 °C (97.5 °F)  Pulse:  [] 104  Resp:  [18-20] 18  BP: ()/(52-53) 98/52  SpO2:  [88 %-98 %] 91 %    Physical Exam   Physical Exam  Vitals and nursing note reviewed.   Constitutional:       Appearance: He is well-developed. He is ill-appearing.   HENT:      Nose: Nose normal.   Eyes:      Conjunctiva/sclera: Conjunctivae normal.   Neck:      Thyroid: No thyromegaly.      Vascular: No JVD.      Trachea: No tracheal deviation.   Cardiovascular:      Rate and Rhythm: Normal rate and regular rhythm.   Pulmonary:      Effort: Pulmonary effort is normal. No respiratory distress.      Breath sounds: Normal breath sounds. No stridor. No wheezing or rales.   Abdominal:      Palpations: Abdomen is soft.   Musculoskeletal:         General: No tenderness. Normal range of motion.      Cervical back: Neck supple.   Skin:     General: Skin is warm.      Capillary Refill: Capillary refill takes less than 2 seconds.      Coloration: Skin is not pale.      Findings: No erythema.   Neurological:      Mental Status: He is alert and oriented to person, place, and time.      Motor: No abnormal muscle tone.   Psychiatric:         Behavior: Behavior normal.         Thought Content: Thought content normal.         Judgment: Judgment normal.     Medications  Current Facility-Administered Medications   Medication Dose Route Frequency Provider Last Rate Last Admin    [START ON 10/6/2022] potassium chloride SA (Kdur) tablet 40 mEq  40 mEq Oral DAILY Altaf Perdomo D.O.        magnesium sulfate IVPB premix 2 g  2 g Intravenous  Once Altaf Perdomo D.O.        influenza Vac High-Dose Quad (Fluzone) injection 0.7 mL  0.7 mL Intramuscular Once PRN Altaf Perdomo D.O.        piperacillin-tazobactam (Zosyn) 3.375 g in  mL IVPB  3.375 g Intravenous Q8HRS Destinee Simeon M.D. 25 mL/hr at 10/05/22 1422 3.375 g at 10/05/22 1422    sodium chloride (OCEAN) 0.65 % nasal spray 2 Spray  2 Spray Nasal Q2HRS PRN Marita Yee D.O.   2 [place] at 10/04/22 0540    oxymetazoline (AFRIN) 0.05 % nasal spray 2 Spray  2 Spray Nasal BID Marita Yee D.O.   2 [place] at 10/05/22 0513    [Held by provider] enoxaparin (Lovenox) inj 40 mg  40 mg Subcutaneous DAILY AT 1800 Abisai Valdez M.D.        furosemide (LASIX) injection 40 mg  40 mg Intravenous Q DAY Fransisco Hutchins M.D.   40 mg at 10/05/22 0508    menthol ([place]) lozenge 1 Lozenge  1 Lozenge Oral Q2HRS PRN Abisai Valdez M.D.        guaiFENesin dextromethorphan (ROBITUSSIN DM) 100-10 MG/5ML syrup 5 mL  5 mL Oral Q6HRS PRN Abisai Valdez M.D.   5 mL at 10/03/22 1430    lidocaine 2 % nebulizer solution 5 mL  5 mL Nebulization Q6HRS PRN (RT) Abisai Valdez M.D.        hydrALAZINE (APRESOLINE) tablet 10 mg  10 mg Oral Q8HRS Marcos Ball M.D.   10 mg at 10/03/22 0513    acyclovir (Zovirax) tablet 400 mg  400 mg Oral DAILY Abisai Valdez M.D.   400 mg at 10/05/22 0517    senna-docusate (PERICOLACE or SENOKOT S) 8.6-50 MG per tablet 2 Tablet  2 Tablet Oral BID Sally Mondragon M.D.   2 Tablet at 10/04/22 0540    And    polyethylene glycol/lytes (MIRALAX) PACKET 1 Packet  1 Packet Oral QDAY PRN Sally Mondragon M.D.        And    magnesium hydroxide (MILK OF MAGNESIA) suspension 30 mL  30 mL Oral QDAY PRN Sally Mondragon M.D.        And    bisacodyl (DULCOLAX) suppository 10 mg  10 mg Rectal QDAY PRN Sally Mondragon M.D.         Fluids    Intake/Output Summary (Last 24 hours) at 10/5/2022 3194  Last data  filed at 10/5/2022 1515  Gross per 24 hour   Intake 520 ml   Output 1200 ml   Net -680 ml       Laboratory          Recent Labs     10/03/22  0218 10/04/22  0300 10/05/22  0515   SODIUM 139 141 144   POTASSIUM 3.6 3.2* 2.9*   CHLORIDE 104 105 108   CO2 20 21 20   BUN 67* 59* 51*   CREATININE 1.82* 2.13* 1.88*   MAGNESIUM 1.7 1.7 1.7   PHOSPHORUS 3.8 3.6 3.6   CALCIUM 8.3* 8.3* 8.4*       Recent Labs     10/03/22  0218 10/04/22  0300 10/05/22  0515   ALTSGPT 53* 35 30   ASTSGOT 20 13 11*   ALKPHOSPHAT 376* 294* 269*   TBILIRUBIN 0.8 0.7 0.7   GLUCOSE 95 85 97       Recent Labs     10/03/22  0218 10/04/22  0300 10/05/22  0515   WBC 6.1 4.1* 4.4*   NEUTSPOLYS 95.40* 95.00* 94.70*   LYMPHOCYTES 1.00* 1.20* 2.00*   MONOCYTES 2.50 2.40 2.30   EOSINOPHILS 0.30 0.70 0.50   BASOPHILS 0.00 0.20 0.00   ASTSGOT 20 13 11*   ALTSGPT 53* 35 30   ALKPHOSPHAT 376* 294* 269*   TBILIRUBIN 0.8 0.7 0.7       Recent Labs     10/03/22  0218 10/04/22  0300 10/05/22  0515   RBC 2.72* 2.49* 2.50*   HEMOGLOBIN 8.5* 7.7* 7.8*   HEMATOCRIT 27.0* 23.1* 22.9*   PLATELETCT 56* 44* 57*       Imaging  X-Ray:  I have personally reviewed the images and compared with prior images.  CT:    Reviewed  XR unchanged from 10/3    Assessment/Plan    * Acute respiratory failure with hypoxia (HCC)  Assessment & Plan  CT showing persistent bilateral patchy ground glass opacities with basilar consolidations w/ bronchiectasis  Has been attributed to persistent vs recurrent COVID with decadron, remdesivir  Also has received empiric abx with doxycycline  Patient has systolic heart failure with an EF of 20%  Pneumomediastinum unlikely contributing to hypoxemia  COVID pneumonitis vs opportunistic PNA vs much less likely drug induced pneumonitis from bendamustine or obinutuzumab (< 1%) v. Lymphotic infiltrative process in the setting of lymphoma   Completed steroids/remedsivir for COVID- has not improved. Too ill for bronchoscopy  ID consulted: sputum cultures, PCP  DFA, LDH, 13BDG, EBV and CMV PCR pending   ID work-up unrevealing thus far    Overall, patient is improving, he feels less short of breath and his oxygen requirements have come down, favor the diagnosis of bacterial superinfection    Continue zosyn, ID following  Continue lasix 40mg IV QD, closely monitoring renal function and blood pressure  Continue saline and Afrin nasal spray for nosebleed  Continue with incentive spirometry and chest physiotherapy  Okay to continue with cough suppression at this time as patient has pneumomediastinum    Hemoptysis  Assessment & Plan  Improving  Transition to oxymask  DC heparin, ASA  If persists consider ENT consult for nasal packing  Continue saline nasal spray and Afrin today      Secondary spontaneous pneumothorax  Assessment & Plan  Pneumothorax and subcutaneous emphysema  Suspect due to fits of coughing  Minimal pleural air- clinically stable no resp distress  Stable on serial imaging  Manage conservatively, aggressive cough suppression  Daily CXR       VTE:  Contraindicated due to nose bleeding     Marita Yee, DO   Pulmonary and Critical Care

## 2022-10-05 NOTE — PROGRESS NOTES
Pt found sitting at edge of bed with oxygen off, satting in 70s. Pt assisted back into bed, O2 sat slowly increased to 86-88%. Pt desatted again after linen and pericare to low 70s, required RT and non-rebreather for approx. 2 mins for O2 sat to recover. Pt remains oriented x 4 through event. RT increased HF settings to 40L at 60%. Pt satting 91%. Pt educated on fall precautions and dangers of removing oxygen. Bed alarm activated. Pt re-educated on use of call light to address needs. Pt verbalized understanding. Will monitor.

## 2022-10-05 NOTE — PROGRESS NOTES
"Hospital Medicine Daily Progress Note    Date of Service  10/5/2022    Chief Complaint  Ceferino Fitch is a 72 y.o. male admitted 9/21/2022 with SOB    Hospital Course  71yo PMHx HTN, NHL on immunotherapy ([place], obinutuzumab), gout, HLD, CKD, HTN.  + COVID 9/13.   TTE LVEF 20% inf/inf/lat hypokinesisi, severe AS.  Has been treated with decadron and Remdesivir on this admission    Interval Problem Update  Called to room pt in a fast narrow reg tachycardia rate 160.  SBP 100s.  States he feels \"funny\" but no CP or SOB    Pt placed on pulse ox, pacer pads and zole  BP cuff cycling  Lab and CXR reviewed from this am  Rythm strips reviewed  Attempted cardioversion with vagal manouvers which was unsuccesful  Given 6mg IV adenosine with subsequent conversion to sinus tach rate 100  Pt tolerated well  Repeat EKG post shows no signs of acute ischemia    1050 called back to pt's room as now 170 with SBP 70  Gave repeat dose of adenosine 6mg with conversion to sinus tach 100  Have consulted Cardiology    Critical care time 40 mins intervening on an unstable tachydysrythmia (SVT) in a pt with LVEF of 20% and severe aortic stenosis as noted above    HFNC 50L/60%  SBP   AFebrile  UOP 1600ml/24hrs on 40mg IV lasix, neg 890ml/24hrs    I have discussed this patient's plan of care and discharge plan at IDT rounds today with Case Management, Nursing, Nursing leadership, and other members of the IDT team.    Consultants/Specialty  cardiology, infectious disease, and pulmonary    Code Status  Full Code    Disposition  Patient is not medically cleared for discharge.   Anticipate discharge to to home with close outpatient follow-up.  I have placed the appropriate orders for post-discharge needs.    Review of Systems  Review of Systems   Constitutional:  Negative for chills and fever.   HENT:  Negative for nosebleeds and sore throat.    Eyes:  Negative for blurred vision and double vision.   Respiratory:  Positive for cough, " sputum production and shortness of breath.    Cardiovascular:  Positive for palpitations. Negative for chest pain, orthopnea and leg swelling.   Gastrointestinal:  Negative for abdominal pain, diarrhea, nausea and vomiting.   Genitourinary:  Negative for dysuria and urgency.   Musculoskeletal:  Negative for back pain.   Skin:  Negative for rash.   Neurological:  Negative for dizziness, loss of consciousness and headaches.      Physical Exam  Temp:  [36.6 °C (97.9 °F)-36.8 °C (98.2 °F)] 36.6 °C (97.9 °F)  Pulse:  [] 98  Resp:  [16-28] 18  BP: ()/(50-54) 100/53  SpO2:  [88 %-100 %] 98 %    Physical Exam  Constitutional:       General: He is not in acute distress.     Appearance: Normal appearance. He is well-developed. He is not diaphoretic.   HENT:      Head: Normocephalic and atraumatic.   Eyes:      Conjunctiva/sclera: Conjunctivae normal.   Neck:      Vascular: No JVD.   Cardiovascular:      Rate and Rhythm: Regular rhythm. Tachycardia present.      Heart sounds: No murmur heard.    No gallop.   Pulmonary:      Effort: Pulmonary effort is normal. No respiratory distress.      Breath sounds: No stridor. Rhonchi present. No wheezing or rales.   Abdominal:      Palpations: Abdomen is soft.      Tenderness: There is no abdominal tenderness. There is no guarding or rebound.   Musculoskeletal:         General: No tenderness.      Right lower leg: No edema.      Left lower leg: No edema.   Skin:     General: Skin is warm and dry.      Capillary Refill: Capillary refill takes less than 2 seconds.      Findings: No rash.   Neurological:      Mental Status: He is alert and oriented to person, place, and time.   Psychiatric:         Mood and Affect: Mood normal.         Behavior: Behavior normal.         Thought Content: Thought content normal.       Fluids    Intake/Output Summary (Last 24 hours) at 10/5/2022 0730  Last data filed at 10/5/2022 0700  Gross per 24 hour   Intake --   Output 1540 ml   Net -1540 ml          Laboratory  Recent Labs     10/03/22  0218 10/04/22  0300 10/05/22  0515   WBC 6.1 4.1* 4.4*   RBC 2.72* 2.49* 2.50*   HEMOGLOBIN 8.5* 7.7* 7.8*   HEMATOCRIT 27.0* 23.1* 22.9*   MCV 99.3* 92.8 91.6   MCH 31.3 30.9 31.2   MCHC 31.5* 33.3* 34.1   RDW 51.8* 48.5 47.9   PLATELETCT 56* 44* 57*   MPV 12.0 12.7 13.2*       Recent Labs     10/03/22  0218 10/04/22  0300 10/05/22  0515   SODIUM 139 141 144   POTASSIUM 3.6 3.2* 2.9*   CHLORIDE 104 105 108   CO2 20 21 20   GLUCOSE 95 85 97   BUN 67* 59* 51*   CREATININE 1.82* 2.13* 1.88*   CALCIUM 8.3* 8.3* 8.4*                     Imaging  DX-CHEST-PORTABLE (1 VIEW)   Final Result         1.  Pulmonary edema and/or infiltrates are identified, which are stable since the prior exam.   2.  Soft tissue gas in the chest wall and neck, increased on the left since prior study..   3.  Atherosclerosis      DX-CHEST-PORTABLE (1 VIEW)   Final Result         1.  Pulmonary edema and/or infiltrates are identified, which are stable since the prior exam.   2.  Right chest wall and neck soft tissue gas.   3.  Atherosclerosis      DX-CHEST-PORTABLE (1 VIEW)   Final Result      1.  No visible pneumothorax. This could be obscured by the patient's pneumomediastinum and soft tissue gas.   2.  Unchanged BILATERAL pulmonary opacities      DX-CHEST-PORTABLE (1 VIEW)   Final Result      1.  No visible pneumothorax. This could be obscured by the patient's pneumomediastinum and soft tissue gas.   2.  Unchanged BILATERAL pulmonary opacities      CT-CHEST (THORAX) W/O   Final Result      1.  Extensive pneumomediastinum with air within the soft tissues of the right neck and chest.   2.  Small right pneumothorax.   3.  Patchy extensive bilateral groundglass opacities with more dependent consolidative opacities are nonspecific, Covid Pneumonia versus other infectious/inflammatory etiology   4.  Sclerotic lesions in the right T11 and T12 levels in keeping with sclerotic osseous metastases   These  findings were discussed with PROSPER BOWMAN III on 10/1/2022 4:49 PM.      Fleischner Society pulmonary nodule recommendations:   Not Applicable         DX-CHEST-PORTABLE (1 VIEW)   Final Result      1.  There is a new small right apical pneumothorax with new right-sided subcutaneous air extending into the neck. Question of a small amount of mediastinal air.   2.  Interval increase in patchy lower lobe opacities which could be atelectasis or pneumonitis or edema.      3.  Findings were discussed with lEeno, the patient's nurse at 2:58 PM on 10/1/2022 on 10/1/2022 2:58 PM. He states he will give this message to the physician taking care of the patient.      DX-CHEST-PORTABLE (1 VIEW)   Final Result      1.  Right-sided P[place]-Frannie catheter tip projects in the distal right main pulmonary artery region. No pneumothorax.   2.  Mild interval worsening in hazy bilateral pulmonary opacities.         DX-CHEST-PORTABLE (1 VIEW)   Final Result      1.  Right-sided [place]-Frannie catheter tip projects in the distal right main pulmonary artery region. No pneumothorax.   2.  Probable slight improvement in hazy bilateral pulmonary opacities.      DX-CHEST-PORTABLE (1 VIEW)   Final Result      1.  Right-sided PICC line unchanged in position.      2.  Increased interstitial markings throughout the lung fields especially in the perihilar regions consistent with pulmonary edema and/or pneumonitis.      CT-TAVR CHEST-ABD-PELVIS W/WO POST PROCESS   Final Result      1.  Limited by lack of intravenous contrast      2.  Aortic valve Agatston score 4565 and the annulus measures approximately 450 sq mm      3.  Coronary arteries originate over a centimeter above the annulus      4.  Severe atherosclerotic change with marked narrowing of the iliofemoral runoff, right worse than left      5.  Multifocal groundglass greater than bronchial thickening is compatible with known Covid pneumonia. There is also evidence of lower lung zone scarring,  possible interstitial lung disease and small pleural fluid      6.  Colonic diverticulosis, emphysema, left heart enlargement      DX-CHEST-PORTABLE (1 VIEW)   Final Result      Unchanged BILATERAL pneumonia      EC-ECHOCARDIOGRAM COMPLETE W/O CONT   Final Result      DX-CHEST-LIMITED (1 VIEW)   Final Result      1.  Multifocal pneumonia. Pulmonary edema could have a similar appearance.      US-RUQ   Final Result      Unremarkable RIGHT upper quadrant ultrasound             Assessment/Plan  * Acute respiratory failure with hypoxia (HCC)  Assessment & Plan  Respiratory failure in setting of covid -19 vs ? HF   Incentive spirometry  Currently he is on high flow oxygen to maintain oxygen saturation.   Pulm following  Has been treated for COVID with decadron and Remdesivir  ?secondary infectious process: ID consulted and following  ?steroids: defer to pulm  Currently on Pip/Tazo  Acyclovir is for prophylaxis  Has been on IV lasix with significant neg fluid balance.  Change to po maintenance    SVT (supraventricular tachycardia) (HCC)  Assessment & Plan  Requiring cardioversion with adenosine 6mg   BPs are soft precluding BB or CCB  Concerned with an LVEF of 20% and severe AS he will not tolerate this dysrythmia well  Consulting Cards    Secondary spontaneous pneumothorax  Assessment & Plan  He found to have a pneumothorax on chest x-ray October 02, 2022.  Pulmonary and infectious disease consulted.  Continue to monitor closely.  Daily chest x-ray.    Heart failure with reduced ejection fraction (HCC)  Assessment & Plan  Echo showed severely reduced ejection fraction   Cardiology is following him.  Monitor volume status; on IV lasix  Need to be cautious with diuresis given AS  Afterload reduction on hydralazine  Initiate GDMT as pressures allow  Will reconsult Cards for Dobutamine stress test once off HFNC    Advance care planning  Assessment & Plan  Discussed by prior treatment team.  had a prolonged discussion with the  patient regarding goals of care, diagnoses, prognosis, and CODE STATUS. We discussed his   prognosis and comorbidities. I spent  16  minutes on advanced care planning in addition to the time spent managing the other medical problems.    He requested to remain full code      Elevated liver enzymes  Assessment & Plan  Hepatitis panel came back negative.  right upper quadrant ultrasound did not show any acute abnormalities.  Could be secondary to remdesivir or congestive etiology  AST/ALT now in normal range    Elevated brain natriuretic peptide (BNP) level  Assessment & Plan  Likely in setting of COVID-19 infection  Euvolemic on exam  Echo showed reduced ejection fraction.    CKD (chronic kidney disease)  Assessment & Plan  Renal function has been fluctuating.  Increased BUN and creatinine this morning.  Continue to monitor  Renally dose medications as appropriate    Pancytopenia (HCC)  Assessment & Plan  In setting of immunotherapy  White blood cell count has been fluctuating.  Continue to monitor    Hypokalemia  Assessment & Plan  2.6 today  -40meq q6 x 2 doses  -40meq IV x 1  Start scheduled po 40meq tomorrow  Replacing Mg      Low bicarbonate  Assessment & Plan  In setting of CKD  Continue to monitor.    COVID  Assessment & Plan    Activate sepsis protocol  COVID Isolation  He was placed on oxygen nasal cannula but he developed hypoxia again and he was placed on high flow nasal cannula.  Completed Decadron last dose October 1, 2022  He completed course of remdesivir.  Repeat COVID testing ordered by pulmonologist came back positive.  Monitor oxygen saturation closely      NSTEMI (non-ST elevated myocardial infarction) (HCC)- (present on admission)  Assessment & Plan    Echo showing ef of 20% probable severer Aortic stenosis  Cardiology evaluated him and recommended to reconsult for possible stress echo when he is more stable.  No symptoms of acute chest pain.      Essential hypertension, benign- (present on  admission)  Assessment & Plan  Mild hypotension.  Continue to monitor.    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    last immunotherapy infusion to treat his cancer in [place] on 09/09/2022  Follow-up with outpatient       VTE prophylaxis: enoxaparin ppx    I have performed a physical exam and reviewed and updated ROS and Plan today (10/5/2022). In review of yesterday's note (10/4/2022), there are no changes except as documented above.

## 2022-10-05 NOTE — DISCHARGE PLANNING
Case Management Discharge Planning    Admission Date: 9/21/2022  GMLOS: 5.4  ALOS: 14    6-Clicks ADL Score: 16  6-Clicks Mobility Score: 14  PT and/or OT Eval ordered: No  Post-acute Referrals Ordered: No  Post-acute Choice Obtained: No  Has referral(s) been sent to post-acute provider:  No      Anticipated Discharge Dispo: Discharge Disposition: D/T to Medicare cert LTCH w/planned hosp IP readmit (13)  Discharge Address: [address] [place] [address]  Discharge Contact Phone Number: [phone]    DME Needed: No    Action(s) Taken: Case discussed with Dr. Perdomo.  RN CM asked if the patient is appropriate for LTAC.  MD stated that he will need to discuss with Pulmonary to determine if the patient is ready for transfer.    Escalations Completed: Provider    Medically Clear: No    Next Steps: Care coordination to follow for medical clearance.    Barriers to Discharge: Medical clearance and Pending Placement, patient remains on high flow O2.    Is the patient up for discharge tomorrow: No

## 2022-10-05 NOTE — PROGRESS NOTES
"Hospital Medicine Daily Progress Note    Date of Service  10/5/2022    Chief Complaint  Ceferino Fitch is a 72 y.o. male admitted 9/21/2022 with SOB    Hospital Course  71yo PMHx HTN, NHL on immunotherapy ([place], obinutuzumab), gout, HLD, CKD, HTN.  + COVID 9/13.   TTE LVEF 20% inf/inf/lat hypokinesisi, severe AS.  Has been treated with decadron and Remdesivir on this admission    Interval Problem Update  \"I think I'm better\"  No CP.  Cont's to have a cough which is mostly dry.  No other complaints    HFNC 30L/50%  Sinus 80s  SBP   AFebrile  UOP 700ml/24hrs on 40mg IV lasix qday    I have discussed this patient's plan of care and discharge plan at IDT rounds today with Case Management, Nursing, Nursing leadership, and other members of the IDT team.    Consultants/Specialty  cardiology, infectious disease, and pulmonary    Code Status  Full Code    Disposition  Patient is not medically cleared for discharge.   Anticipate discharge to to home with close outpatient follow-up.  I have placed the appropriate orders for post-discharge needs.    Review of Systems  Review of Systems   Constitutional:  Negative for chills and fever.   Respiratory:  Positive for cough and shortness of breath. Negative for sputum production.    Cardiovascular:  Negative for chest pain, palpitations, orthopnea and leg swelling.   Gastrointestinal:  Negative for abdominal pain, diarrhea, nausea and vomiting.   Genitourinary:  Negative for dysuria and urgency.   Musculoskeletal:  Negative for back pain and neck pain.   Skin:  Negative for rash.   Neurological:  Negative for dizziness, loss of consciousness and headaches.      Physical Exam  Temp:  [36.6 °C (97.9 °F)-36.8 °C (98.2 °F)] 36.6 °C (97.9 °F)  Pulse:  [] 98  Resp:  [16-28] 18  BP: ()/(51-54) 100/53  SpO2:  [88 %-100 %] 98 %    Physical Exam  Constitutional:       General: He is not in acute distress.     Appearance: Normal appearance. He is well-developed. He is not " diaphoretic.   HENT:      Head: Normocephalic and atraumatic.   Eyes:      General: No scleral icterus.        Right eye: No discharge.         Left eye: No discharge.      Conjunctiva/sclera: Conjunctivae normal.   Neck:      Vascular: No JVD.   Cardiovascular:      Rate and Rhythm: Normal rate.      Heart sounds: No murmur heard.    No gallop.   Pulmonary:      Effort: Pulmonary effort is normal. No respiratory distress.      Breath sounds: No stridor. Rhonchi present. No wheezing or rales.   Abdominal:      Palpations: Abdomen is soft.      Tenderness: There is no abdominal tenderness. There is no guarding or rebound.   Musculoskeletal:         General: No tenderness.      Right lower leg: No edema.      Left lower leg: No edema.   Skin:     General: Skin is warm and dry.      Capillary Refill: Capillary refill takes less than 2 seconds.      Findings: No rash.   Neurological:      Mental Status: He is alert and oriented to person, place, and time. Mental status is at baseline.   Psychiatric:         Mood and Affect: Mood normal.         Behavior: Behavior normal.         Thought Content: Thought content normal.       Fluids    Intake/Output Summary (Last 24 hours) at 10/5/2022 0811  Last data filed at 10/5/2022 0700  Gross per 24 hour   Intake 400 ml   Output 1120 ml   Net -720 ml         Laboratory  Recent Labs     10/03/22  0218 10/04/22  0300 10/05/22  0515   WBC 6.1 4.1* 4.4*   RBC 2.72* 2.49* 2.50*   HEMOGLOBIN 8.5* 7.7* 7.8*   HEMATOCRIT 27.0* 23.1* 22.9*   MCV 99.3* 92.8 91.6   MCH 31.3 30.9 31.2   MCHC 31.5* 33.3* 34.1   RDW 51.8* 48.5 47.9   PLATELETCT 56* 44* 57*   MPV 12.0 12.7 13.2*       Recent Labs     10/03/22  0218 10/04/22  0300 10/05/22  0515   SODIUM 139 141 144   POTASSIUM 3.6 3.2* 2.9*   CHLORIDE 104 105 108   CO2 20 21 20   GLUCOSE 95 85 97   BUN 67* 59* 51*   CREATININE 1.82* 2.13* 1.88*   CALCIUM 8.3* 8.3* 8.4*                     Imaging  DX-CHEST-PORTABLE (1 VIEW)   Final Result          1.  Pulmonary edema and/or infiltrates are identified, which are stable since the prior exam.   2.  Soft tissue gas in the chest wall and neck, increased on the left since prior study..   3.  Atherosclerosis      DX-CHEST-PORTABLE (1 VIEW)   Final Result         1.  Pulmonary edema and/or infiltrates are identified, which are stable since the prior exam.   2.  Right chest wall and neck soft tissue gas.   3.  Atherosclerosis      DX-CHEST-PORTABLE (1 VIEW)   Final Result      1.  No visible pneumothorax. This could be obscured by the patient's pneumomediastinum and soft tissue gas.   2.  Unchanged BILATERAL pulmonary opacities      DX-CHEST-PORTABLE (1 VIEW)   Final Result      1.  No visible pneumothorax. This could be obscured by the patient's pneumomediastinum and soft tissue gas.   2.  Unchanged BILATERAL pulmonary opacities      CT-CHEST (THORAX) W/O   Final Result      1.  Extensive pneumomediastinum with air within the soft tissues of the right neck and chest.   2.  Small right pneumothorax.   3.  Patchy extensive bilateral groundglass opacities with more dependent consolidative opacities are nonspecific, Covid Pneumonia versus other infectious/inflammatory etiology   4.  Sclerotic lesions in the right T11 and T12 levels in keeping with sclerotic osseous metastases   These findings were discussed with PROSPER BOWMAN III on 10/1/2022 4:49 PM.      Fleischner Society pulmonary nodule recommendations:   Not Applicable         DX-CHEST-PORTABLE (1 VIEW)   Final Result      1.  There is a new small right apical pneumothorax with new right-sided subcutaneous air extending into the neck. Question of a small amount of mediastinal air.   2.  Interval increase in patchy lower lobe opacities which could be atelectasis or pneumonitis or edema.      3.  Findings were discussed with Eleno, the patient's nurse at 2:58 PM on 10/1/2022 on 10/1/2022 2:58 PM. He states he will give this message to the physician taking care  of the patient.      DX-CHEST-PORTABLE (1 VIEW)   Final Result      1.  Right-sided [place] catheter tip projects in the distal right main pulmonary artery region. No pneumothorax.   2.  Mild interval worsening in hazy bilateral pulmonary opacities.         DX-CHEST-PORTABLE (1 VIEW)   Final Result      1.  Right-sided [place] catheter tip projects in the distal right main pulmonary artery region. No pneumothorax.   2.  Probable slight improvement in hazy bilateral pulmonary opacities.      DX-CHEST-PORTABLE (1 VIEW)   Final Result      1.  Right-sided PICC line unchanged in position.      2.  Increased interstitial markings throughout the lung fields especially in the perihilar regions consistent with pulmonary edema and/or pneumonitis.      CT-TAVR CHEST-ABD-PELVIS W/WO POST PROCESS   Final Result      1.  Limited by lack of intravenous contrast      2.  Aortic valve Agatston score 4565 and the annulus measures approximately 450 sq mm      3.  Coronary arteries originate over a centimeter above the annulus      4.  Severe atherosclerotic change with marked narrowing of the iliofemoral runoff, right worse than left      5.  Multifocal groundglass greater than bronchial thickening is compatible with known Covid pneumonia. There is also evidence of lower lung zone scarring, possible interstitial lung disease and small pleural fluid      6.  Colonic diverticulosis, emphysema, left heart enlargement      DX-CHEST-PORTABLE (1 VIEW)   Final Result      Unchanged BILATERAL pneumonia      EC-ECHOCARDIOGRAM COMPLETE W/O CONT   Final Result      DX-CHEST-LIMITED (1 VIEW)   Final Result      1.  Multifocal pneumonia. Pulmonary edema could have a similar appearance.      US-RUQ   Final Result      Unremarkable RIGHT upper quadrant ultrasound             Assessment/Plan  * Acute respiratory failure with hypoxia (HCC)  Assessment & Plan  Respiratory failure in setting of covid -19 vs ? HF   Incentive spirometry  Currently he  is on high flow oxygen to maintain oxygen saturation. 30L/50%  Pulm following  Has been treated for COVID with decadron and Remdesivir  ?secondary infectious process: ID consulted and following  ?steroids: defer to pulm  Currently on Pip/Tazo  Acyclovir is for prophylaxis    Secondary spontaneous pneumothorax  Assessment & Plan  He found to have a pneumothorax on chest x-ray October 02, 2022.  Pulmonary and infectious disease consulted.  Continue to monitor closely.  Daily chest x-ray.    Heart failure with reduced ejection fraction (HCC)  Assessment & Plan  Echo showed severely reduced ejection fraction   Cardiology is following him.  Monitor volume status; on IV lasix  Need to be cautious with diuresis given AS  Afterload reduction on hydralazine  Initiate GDMT as pressures allow  Will reconsult Cards for Dobutamine stress test once off HFNC    Advance care planning  Assessment & Plan  Discussed by prior treatment team.  had a prolonged discussion with the patient regarding goals of care, diagnoses, prognosis, and CODE STATUS. We discussed his   prognosis and comorbidities. I spent  16  minutes on advanced care planning in addition to the time spent managing the other medical problems.    He requested to remain full code      Elevated liver enzymes  Assessment & Plan  Hepatitis panel came back negative.  right upper quadrant ultrasound did not show any acute abnormalities.  Could be secondary to remdesivir or congestive etiology  AST/ALT now in normal range    Elevated brain natriuretic peptide (BNP) level  Assessment & Plan  Likely in setting of COVID-19 infection  Euvolemic on exam  Echo showed reduced ejection fraction.    CKD (chronic kidney disease)  Assessment & Plan  Renal function has been fluctuating.  Increased BUN and creatinine this morning.  Continue to monitor  Renally dose medications as appropriate    Pancytopenia (HCC)  Assessment & Plan  In setting of immunotherapy  White blood cell count has  been fluctuating.  Continue to monitor    Hypokalemia  Assessment & Plan  2.6 today  -40meq q6 x 2 doses  -40meq IV x 1  Start scheduled po 40meq tomorrow  Replacing Mg      Low bicarbonate  Assessment & Plan  In setting of CKD  Continue to monitor.    COVID  Assessment & Plan    Activate sepsis protocol  COVID Isolation  He was placed on oxygen nasal cannula but he developed hypoxia again and he was placed on high flow nasal cannula.  Completed Decadron last dose October 1, 2022  He completed course of remdesivir.  Repeat COVID testing ordered by pulmonologist came back positive.  Monitor oxygen saturation closely      NSTEMI (non-ST elevated myocardial infarction) (HCC)- (present on admission)  Assessment & Plan    Echo showing ef of 20% probable severer Aortic stenosis  Cardiology evaluated him and recommended to reconsult for possible stress echo when he is more stable.  No symptoms of acute chest pain.      Essential hypertension, benign- (present on admission)  Assessment & Plan  Mild hypotension.  Continue to monitor.    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    last immunotherapy infusion to treat his cancer in [place] on 09/09/2022  Follow-up with outpatient       VTE prophylaxis: enoxaparin ppx    I have performed a physical exam and reviewed and updated ROS and Plan today (10/5/2022). In review of yesterday's note (10/4/2022), there are no changes except as documented above.

## 2022-10-05 NOTE — PROGRESS NOTES
Pt transferred to unit on transport monitor. Tele box in place, monitor room notified. Pt oriented to unit, reports no further needs at this time.

## 2022-10-05 NOTE — CARE PLAN
Problem: Humidified High Flow Nasal Cannula  Goal: Maintain adequate oxygenation dependent on patient condition  Description: Target End Date:  resolve prior to discharge or when underlying condition is resolved/stabilized    1.  Implement humidified high flow oxygen therapy  2.  Titrate high flow oxygen to maintain appropriate SpO2  Outcome: Progressing  Flowsheets  Taken 10/5/2022 1515 by Chepe Holguin, C.N.A.  O2 (LPM): 30  Taken 10/5/2022 1433 by Ross Irby, RRT  FiO2%: 40 %  Taken 9/25/2022 1725 by Saran Goins, RRT  Indication: All other patients: SpO2 less than 90% despite conventional supplemental oxygen devices  Outcome: Normoxia

## 2022-10-06 ENCOUNTER — APPOINTMENT (OUTPATIENT)
Dept: RADIOLOGY | Facility: MEDICAL CENTER | Age: 72
DRG: 177 | End: 2022-10-06
Attending: HOSPITALIST
Payer: MEDICARE

## 2022-10-06 PROBLEM — I47.10 SVT (SUPRAVENTRICULAR TACHYCARDIA) (HCC): Status: ACTIVE | Noted: 2022-10-06

## 2022-10-06 LAB
ALBUMIN SERPL BCP-MCNC: 2.9 G/DL (ref 3.2–4.9)
ALBUMIN/GLOB SERPL: 1 G/DL
ALP SERPL-CCNC: 243 U/L (ref 30–99)
ALT SERPL-CCNC: 31 U/L (ref 2–50)
ANION GAP SERPL CALC-SCNC: 12 MMOL/L (ref 7–16)
ANION GAP SERPL CALC-SCNC: 12 MMOL/L (ref 7–16)
ANION GAP SERPL CALC-SCNC: 16 MMOL/L (ref 7–16)
AST SERPL-CCNC: 18 U/L (ref 12–45)
BASOPHILS # BLD AUTO: 0 % (ref 0–1.8)
BASOPHILS # BLD: 0 K/UL (ref 0–0.12)
BILIRUB SERPL-MCNC: 0.7 MG/DL (ref 0.1–1.5)
BUN SERPL-MCNC: 38 MG/DL (ref 8–22)
BUN SERPL-MCNC: 45 MG/DL (ref 8–22)
BUN SERPL-MCNC: 46 MG/DL (ref 8–22)
CALCIUM SERPL-MCNC: 7 MG/DL (ref 8.5–10.5)
CALCIUM SERPL-MCNC: 8.5 MG/DL (ref 8.5–10.5)
CALCIUM SERPL-MCNC: 8.6 MG/DL (ref 8.5–10.5)
CHLORIDE SERPL-SCNC: 107 MMOL/L (ref 96–112)
CHLORIDE SERPL-SCNC: 109 MMOL/L (ref 96–112)
CHLORIDE SERPL-SCNC: 116 MMOL/L (ref 96–112)
CO2 SERPL-SCNC: 15 MMOL/L (ref 20–33)
CO2 SERPL-SCNC: 17 MMOL/L (ref 20–33)
CO2 SERPL-SCNC: 19 MMOL/L (ref 20–33)
CREAT SERPL-MCNC: 1.56 MG/DL (ref 0.5–1.4)
CREAT SERPL-MCNC: 1.77 MG/DL (ref 0.5–1.4)
CREAT SERPL-MCNC: 1.97 MG/DL (ref 0.5–1.4)
D DIMER PPP IA.FEU-MCNC: 1.79 UG/ML (FEU) (ref 0–0.5)
EOSINOPHIL # BLD AUTO: 0.03 K/UL (ref 0–0.51)
EOSINOPHIL NFR BLD: 0.8 % (ref 0–6.9)
ERYTHROCYTE [DISTWIDTH] IN BLOOD BY AUTOMATED COUNT: 48.6 FL (ref 35.9–50)
GFR SERPLBLD CREATININE-BSD FMLA CKD-EPI: 35 ML/MIN/1.73 M 2
GFR SERPLBLD CREATININE-BSD FMLA CKD-EPI: 40 ML/MIN/1.73 M 2
GFR SERPLBLD CREATININE-BSD FMLA CKD-EPI: 46 ML/MIN/1.73 M 2
GLOBULIN SER CALC-MCNC: 2.9 G/DL (ref 1.9–3.5)
GLUCOSE SERPL-MCNC: 105 MG/DL (ref 65–99)
GLUCOSE SERPL-MCNC: 109 MG/DL (ref 65–99)
GLUCOSE SERPL-MCNC: 82 MG/DL (ref 65–99)
HCT VFR BLD AUTO: 23.2 % (ref 42–52)
HGB BLD-MCNC: 7.8 G/DL (ref 14–18)
IMM GRANULOCYTES # BLD AUTO: 0.01 K/UL (ref 0–0.11)
IMM GRANULOCYTES NFR BLD AUTO: 0.3 % (ref 0–0.9)
LYMPHOCYTES # BLD AUTO: 0.07 K/UL (ref 1–4.8)
LYMPHOCYTES NFR BLD: 1.8 % (ref 22–41)
MAGNESIUM SERPL-MCNC: 1.9 MG/DL (ref 1.5–2.5)
MAGNESIUM SERPL-MCNC: 2.5 MG/DL (ref 1.5–2.5)
MCH RBC QN AUTO: 31.2 PG (ref 27–33)
MCHC RBC AUTO-ENTMCNC: 33.6 G/DL (ref 33.7–35.3)
MCV RBC AUTO: 92.8 FL (ref 81.4–97.8)
MONOCYTES # BLD AUTO: 0.1 K/UL (ref 0–0.85)
MONOCYTES NFR BLD AUTO: 2.6 % (ref 0–13.4)
NEUTROPHILS # BLD AUTO: 3.64 K/UL (ref 1.82–7.42)
NEUTROPHILS NFR BLD: 94.5 % (ref 44–72)
NRBC # BLD AUTO: 0 K/UL
NRBC BLD-RTO: 0 /100 WBC
PHOSPHATE SERPL-MCNC: 2.7 MG/DL (ref 2.5–4.5)
PLATELET # BLD AUTO: 55 K/UL (ref 164–446)
PMV BLD AUTO: 12.2 FL (ref 9–12.9)
POTASSIUM SERPL-SCNC: 3.6 MMOL/L (ref 3.6–5.5)
POTASSIUM SERPL-SCNC: 4.1 MMOL/L (ref 3.6–5.5)
POTASSIUM SERPL-SCNC: 4.1 MMOL/L (ref 3.6–5.5)
PROT SERPL-MCNC: 5.8 G/DL (ref 6–8.2)
RBC # BLD AUTO: 2.5 M/UL (ref 4.7–6.1)
SODIUM SERPL-SCNC: 140 MMOL/L (ref 135–145)
SODIUM SERPL-SCNC: 140 MMOL/L (ref 135–145)
SODIUM SERPL-SCNC: 143 MMOL/L (ref 135–145)
WBC # BLD AUTO: 3.9 K/UL (ref 4.8–10.8)

## 2022-10-06 PROCEDURE — 99291 CRITICAL CARE FIRST HOUR: CPT | Performed by: HOSPITALIST

## 2022-10-06 PROCEDURE — 700111 HCHG RX REV CODE 636 W/ 250 OVERRIDE (IP): Performed by: HOSPITALIST

## 2022-10-06 PROCEDURE — 700111 HCHG RX REV CODE 636 W/ 250 OVERRIDE (IP): Performed by: INTERNAL MEDICINE

## 2022-10-06 PROCEDURE — 80048 BASIC METABOLIC PNL TOTAL CA: CPT

## 2022-10-06 PROCEDURE — 94640 AIRWAY INHALATION TREATMENT: CPT

## 2022-10-06 PROCEDURE — 99233 SBSQ HOSP IP/OBS HIGH 50: CPT | Performed by: INTERNAL MEDICINE

## 2022-10-06 PROCEDURE — 700105 HCHG RX REV CODE 258: Performed by: INTERNAL MEDICINE

## 2022-10-06 PROCEDURE — 83735 ASSAY OF MAGNESIUM: CPT

## 2022-10-06 PROCEDURE — 71045 X-RAY EXAM CHEST 1 VIEW: CPT

## 2022-10-06 PROCEDURE — 85025 COMPLETE CBC W/AUTO DIFF WBC: CPT

## 2022-10-06 PROCEDURE — A9270 NON-COVERED ITEM OR SERVICE: HCPCS | Performed by: HOSPITALIST

## 2022-10-06 PROCEDURE — 700102 HCHG RX REV CODE 250 W/ 637 OVERRIDE(OP): Performed by: INTERNAL MEDICINE

## 2022-10-06 PROCEDURE — A9270 NON-COVERED ITEM OR SERVICE: HCPCS | Performed by: INTERNAL MEDICINE

## 2022-10-06 PROCEDURE — 93005 ELECTROCARDIOGRAM TRACING: CPT | Performed by: HOSPITALIST

## 2022-10-06 PROCEDURE — 80053 COMPREHEN METABOLIC PANEL: CPT

## 2022-10-06 PROCEDURE — 93005 ELECTROCARDIOGRAM TRACING: CPT

## 2022-10-06 PROCEDURE — 770020 HCHG ROOM/CARE - TELE (206)

## 2022-10-06 PROCEDURE — 36415 COLL VENOUS BLD VENIPUNCTURE: CPT

## 2022-10-06 PROCEDURE — 700102 HCHG RX REV CODE 250 W/ 637 OVERRIDE(OP): Performed by: HOSPITALIST

## 2022-10-06 PROCEDURE — 94760 N-INVAS EAR/PLS OXIMETRY 1: CPT

## 2022-10-06 PROCEDURE — 85379 FIBRIN DEGRADATION QUANT: CPT

## 2022-10-06 PROCEDURE — 84100 ASSAY OF PHOSPHORUS: CPT

## 2022-10-06 RX ORDER — ADENOSINE 3 MG/ML
6 INJECTION, SOLUTION INTRAVENOUS ONCE
Status: DISCONTINUED | OUTPATIENT
Start: 2022-10-06 | End: 2022-10-06

## 2022-10-06 RX ORDER — FUROSEMIDE 40 MG/1
40 TABLET ORAL
Status: DISCONTINUED | OUTPATIENT
Start: 2022-10-07 | End: 2022-10-08

## 2022-10-06 RX ORDER — DEXTROSE MONOHYDRATE 50 MG/ML
INJECTION, SOLUTION INTRAVENOUS CONTINUOUS
Status: DISCONTINUED | OUTPATIENT
Start: 2022-10-06 | End: 2022-10-07

## 2022-10-06 RX ORDER — ADENOSINE 3 MG/ML
6 INJECTION, SOLUTION INTRAVENOUS ONCE
Status: COMPLETED | OUTPATIENT
Start: 2022-10-06 | End: 2022-10-06

## 2022-10-06 RX ORDER — ADENOSINE 3 MG/ML
INJECTION, SOLUTION INTRAVENOUS
Status: COMPLETED | OUTPATIENT
Start: 2022-10-06 | End: 2022-10-06

## 2022-10-06 RX ADMIN — OXYMETAZOLINE HCL 2 SPRAY: 0.05 SPRAY NASAL at 05:20

## 2022-10-06 RX ADMIN — ACYCLOVIR 400 MG: 400 TABLET ORAL at 05:19

## 2022-10-06 RX ADMIN — GUAIFENESIN SYRUP AND DEXTROMETHORPHAN 5 ML: 100; 10 SYRUP ORAL at 16:34

## 2022-10-06 RX ADMIN — FUROSEMIDE 40 MG: 10 INJECTION, SOLUTION INTRAMUSCULAR; INTRAVENOUS at 05:20

## 2022-10-06 RX ADMIN — AMIODARONE HYDROCHLORIDE 1 MG/MIN: 1.8 INJECTION, SOLUTION INTRAVENOUS at 14:38

## 2022-10-06 RX ADMIN — PIPERACILLIN SODIUM AND TAZOBACTAM SODIUM 3.38 G: 3; .375 INJECTION, POWDER, FOR SOLUTION INTRAVENOUS at 05:31

## 2022-10-06 RX ADMIN — PIPERACILLIN SODIUM AND TAZOBACTAM SODIUM 3.38 G: 3; .375 INJECTION, POWDER, FOR SOLUTION INTRAVENOUS at 13:48

## 2022-10-06 RX ADMIN — GUAIFENESIN SYRUP AND DEXTROMETHORPHAN 5 ML: 100; 10 SYRUP ORAL at 08:10

## 2022-10-06 RX ADMIN — ADENOSINE 6 MG: 3 INJECTION INTRAVENOUS at 09:10

## 2022-10-06 RX ADMIN — GUAIFENESIN SYRUP AND DEXTROMETHORPHAN 5 ML: 100; 10 SYRUP ORAL at 02:33

## 2022-10-06 RX ADMIN — POTASSIUM CHLORIDE 40 MEQ: 1500 TABLET, EXTENDED RELEASE ORAL at 05:19

## 2022-10-06 RX ADMIN — PIPERACILLIN SODIUM AND TAZOBACTAM SODIUM 3.38 G: 3; .375 INJECTION, POWDER, FOR SOLUTION INTRAVENOUS at 20:07

## 2022-10-06 RX ADMIN — AMIODARONE HYDROCHLORIDE 150 MG: 1.5 INJECTION, SOLUTION INTRAVENOUS at 14:28

## 2022-10-06 RX ADMIN — ADENOSINE 6 MG: 3 INJECTION INTRAVENOUS at 10:46

## 2022-10-06 RX ADMIN — AMIODARONE HYDROCHLORIDE 1 MG/MIN: 1.8 INJECTION, SOLUTION INTRAVENOUS at 20:06

## 2022-10-06 RX ADMIN — Medication 1 LOZENGE: at 21:33

## 2022-10-06 ASSESSMENT — ENCOUNTER SYMPTOMS
DIARRHEA: 0
ABDOMINAL DISTENTION: 0
COUGH: 0
COUGH: 1
EYE PAIN: 0
POLYDIPSIA: 0
PALPITATIONS: 1
SPEECH DIFFICULTY: 0
DIAPHORESIS: 0
APPETITE CHANGE: 0
LIGHT-HEADEDNESS: 0
EYE REDNESS: 0
CHILLS: 0
ADENOPATHY: 0
BACK PAIN: 0
SHORTNESS OF BREATH: 0
NAUSEA: 0
POLYPHAGIA: 0
PALPITATIONS: 0
EYE ITCHING: 0
CARDIOVASCULAR NEGATIVE: 1
TREMORS: 0
STRIDOR: 0
BRUISES/BLEEDS EASILY: 0
CHOKING: 0
CHEST TIGHTNESS: 0
NUMBNESS: 0
WEAKNESS: 0
FLANK PAIN: 0
MYALGIAS: 0
CONSTIPATION: 0
NERVOUS/ANXIOUS: 0
ACTIVITY CHANGE: 0
COLOR CHANGE: 0
SHORTNESS OF BREATH: 1
DECREASED CONCENTRATION: 0
AGITATION: 0
EYE DISCHARGE: 0
HEADACHES: 0
PSYCHIATRIC NEGATIVE: 1
ABDOMINAL PAIN: 0
HALLUCINATIONS: 0
CONFUSION: 0
SEIZURES: 0
JOINT SWELLING: 0

## 2022-10-06 ASSESSMENT — PAIN DESCRIPTION - PAIN TYPE: TYPE: ACUTE PAIN

## 2022-10-06 ASSESSMENT — FIBROSIS 4 INDEX: FIB4 SCORE: 4.29

## 2022-10-06 NOTE — ASSESSMENT & PLAN NOTE
Requiring cardioversion with adenosine 6mg   BPs are soft precluding BB or CCB  Concerned with an LVEF of 20% and severe AS he will not tolerate this dysrythmia well  Cards consulted  Having recurrent episodes with exertion  IV load 500mcg Dig and started renally adjusted po, check level in tomorrow given CKD  Once Amio is completely loaded  Could consider trial off Dig as long term given renal function this would be a dificult medication  Cont po amio load

## 2022-10-06 NOTE — PROGRESS NOTES
Cardiology Follow Up Progress Note    Date of Service  10/6/2022    Attending Physician  Altaf Perdomo, *    Chief Complaint   Narrow complex tachycardia    LIEN Fitch is a 72 y.o. male admitted 9/21/2022 with narrow complex tachycardia    Interim Events  Narrow complex tachycardia broke with adenosine  Continues with SOB  No chest pain  Cr stable      Review of Systems  Review of Systems   Constitutional:  Negative for activity change, appetite change, chills and diaphoresis.   Eyes:  Negative for pain, discharge, redness and itching.   Respiratory:  Positive for shortness of breath. Negative for cough, choking, chest tightness and stridor.    Cardiovascular:  Negative for palpitations.   Gastrointestinal:  Negative for abdominal distention, abdominal pain, constipation, diarrhea and nausea.   Endocrine: Negative for cold intolerance, heat intolerance, polydipsia and polyphagia.   Genitourinary:  Negative for difficulty urinating, dysuria, enuresis, flank pain, frequency, genital sores and hematuria.   Musculoskeletal:  Negative for back pain, gait problem, joint swelling and myalgias.   Skin:  Negative for color change, pallor and rash.   Neurological:  Negative for tremors, seizures, syncope, speech difficulty, weakness, light-headedness, numbness and headaches.   Hematological:  Negative for adenopathy. Does not bruise/bleed easily.   Psychiatric/Behavioral:  Negative for agitation, behavioral problems, confusion, decreased concentration and hallucinations. The patient is not nervous/anxious.      Vital signs in last 24 hours  Temp:  [36.3 °C (97.3 °F)-36.7 °C (98.1 °F)] 36.3 °C (97.3 °F)  Pulse:  [] 106  Resp:  [17-20] 18  BP: ()/(50-68) 94/55  SpO2:  [91 %-100 %] 100 %    Physical Exam  Physical Exam  Vitals and nursing note reviewed.   Constitutional:       General: He is not in acute distress.     Appearance: Normal appearance. He is normal weight. He is not ill-appearing,  toxic-appearing or diaphoretic.   HENT:      Head: Normocephalic and atraumatic.      Right Ear: Ear canal and external ear normal.      Left Ear: Ear canal and external ear normal.      Nose: Nose normal. No congestion or rhinorrhea.      Mouth/Throat:      Mouth: Mucous membranes are moist.      Pharynx: Oropharynx is clear. No oropharyngeal exudate or posterior oropharyngeal erythema.   Eyes:      General: No scleral icterus.        Right eye: No discharge.         Left eye: No discharge.      Extraocular Movements: Extraocular movements intact.      Conjunctiva/sclera: Conjunctivae normal.      Pupils: Pupils are equal, round, and reactive to light.   Neck:      Vascular: No carotid bruit.   Cardiovascular:      Rate and Rhythm: Normal rate and regular rhythm.      Pulses: Normal pulses.      Heart sounds: Normal heart sounds. No murmur heard.    No gallop.   Pulmonary:      Effort: Pulmonary effort is normal. No respiratory distress.      Breath sounds: Normal breath sounds. No stridor. No wheezing, rhonchi or rales.   Abdominal:      General: Abdomen is flat. Bowel sounds are normal. There is no distension.      Palpations: Abdomen is soft. There is no mass.      Tenderness: There is no abdominal tenderness. There is no guarding or rebound.      Hernia: No hernia is present.   Musculoskeletal:         General: No swelling or tenderness. Normal range of motion.      Cervical back: Normal range of motion and neck supple. No rigidity. No muscular tenderness.      Right lower leg: No edema.      Left lower leg: No edema.   Lymphadenopathy:      Cervical: No cervical adenopathy.   Skin:     General: Skin is warm and dry.      Capillary Refill: Capillary refill takes 2 to 3 seconds.      Coloration: Skin is not jaundiced or pale.      Findings: No bruising or lesion.   Neurological:      General: No focal deficit present.      Mental Status: He is alert and oriented to person, place, and time. Mental status is at  baseline.      Cranial Nerves: No cranial nerve deficit.      Motor: No weakness.   Psychiatric:         Mood and Affect: Mood normal.         Behavior: Behavior normal.         Thought Content: Thought content normal.         Judgment: Judgment normal.       Lab Review  Lab Results   Component Value Date/Time    WBC 3.9 (L) 10/06/2022 03:50 AM    RBC 2.50 (L) 10/06/2022 03:50 AM    HEMOGLOBIN 7.8 (L) 10/06/2022 03:50 AM    HEMATOCRIT 23.2 (L) 10/06/2022 03:50 AM    MCV 92.8 10/06/2022 03:50 AM    MCH 31.2 10/06/2022 03:50 AM    MCHC 33.6 (L) 10/06/2022 03:50 AM    MPV 12.2 10/06/2022 03:50 AM      Lab Results   Component Value Date/Time    SODIUM 143 10/06/2022 10:48 AM    POTASSIUM 3.6 10/06/2022 10:48 AM    CHLORIDE 116 (H) 10/06/2022 10:48 AM    CO2 15 (L) 10/06/2022 10:48 AM    GLUCOSE 82 10/06/2022 10:48 AM    BUN 38 (H) 10/06/2022 10:48 AM    CREATININE 1.56 (H) 10/06/2022 10:48 AM      Lab Results   Component Value Date/Time    ASTSGOT 18 10/06/2022 03:50 AM    ALTSGPT 31 10/06/2022 03:50 AM     Lab Results   Component Value Date/Time    CHOLSTRLTOT 144 03/11/2022 08:50 AM    LDL 57 03/11/2022 08:50 AM    HDL 73 (H) 03/11/2022 08:50 AM    TRIGLYCERIDE 112 03/11/2022 08:50 AM    TROPONINT 579 (H) 09/21/2022 10:08 PM       Recent Labs     10/03/22  1425   NTPROBNP 2246*       Cardiac Imaging and Procedures Review  EKG:  My personal interpretation of the EKG dated 10/6/2022 is 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    Echocardiogram: Dated 9/22/2022 personally viewed inter myself showing an EF of 20% inferior infarct possible low-flow low gradient aortic stenosis.    Cardiac Catheterization:  NA    Imaging  Chest X-Ray: Dated 10/6/2022 personally viewed inter myself showing bilateral scarring pulm edema and infiltrates.  No change from prior.    Stress Test: N/A    Assessment/Plan  No new Assessment & Plan notes have been filed under this hospital service since the last note was  generated.  Service: Cardiology  72-year-old male with low-flow low gradient aortic stenosis with an EF of 20% with a recent narrow complex tachycardia.  I will start him on amiodarone IV and transition to oral.    Thank you for allowing me to participate in the care of this patient.  I will continue to follow this patient    Please contact me with any questions.    Joselito Michel M.D.   Cardiologist, [facility]  [phone]

## 2022-10-06 NOTE — CARE PLAN
The patient is Watcher - Medium risk of patient condition declining or worsening    Shift Goals  Clinical Goals: Pt will maintain O2 sat > 89% on current HF settings of 40LPM AT 40%  Patient Goals: Rest and comfort  Family Goals: N/A      Problem: Knowledge Deficit - Standard  Goal: Patient and family/care givers will demonstrate understanding of plan of care, disease process/condition, diagnostic tests and medications  Outcome: Progressing     Problem: Hemodynamics  Goal: Patient's hemodynamics, fluid balance and neurologic status will be stable or improve  Outcome: Progressing     Problem: Fall Risk  Goal: Patient will remain free from falls  Outcome: Progressing     Problem: Respiratory  Goal: Patient will achieve/maintain optimum respiratory ventilation and gas exchange  Outcome: Progressing       Progress made toward(s) clinical / shift goals:  Pt has experienced no falls, currently maintaining O2 sats >90 % on 40% at 40 LPM HF.     Patient is not progressing towards the following goals: N/A

## 2022-10-06 NOTE — CARE PLAN
The patient is Watcher - Medium risk of patient condition declining or worsening    Shift Goals  Clinical Goals: Pt will maintain O2 sat > 89% on current HF settings of 40LPM AT 40%  Patient Goals: Rest and comfort  Family Goals: N/A    Progress made toward(s) clinical / shift goals:    Problem: Knowledge Deficit - Standard  Goal: Patient and family/care givers will demonstrate understanding of plan of care, disease process/condition, diagnostic tests and medications  Outcome: Progressing     Problem: Hemodynamics  Goal: Patient's hemodynamics, fluid balance and neurologic status will be stable or improve  Outcome: Progressing       Patient is not progressing towards the following goals:

## 2022-10-06 NOTE — RESPIRATORY CARE
Respiratory Rapid Response Note    Symptoms: SVT     Breath Sounds  RUL Breath Sounds: Rhonchi (10/06/22 0900)  RML Breath Sounds: Diminished (10/06/22 0900)  RLL Breath Sounds: Diminished (10/06/22 0900)  LEON Breath Sounds: Rhonchi (10/06/22 0900)  LLL Breath Sounds: Diminished (10/06/22 0900)    HHFNC 60L/100%    Events/Summary/Plan: Arrived to pts room, pt satting 100% on 60L/100% HHFNC. Will continue to monitor.

## 2022-10-06 NOTE — PROGRESS NOTES
Pt noted to be in the 160s, SVT sustaining. Pt slightly SOB, otherwise no other symptoms. Awake and alert during run. MD at bedside. Pt continued to sustain so adenosine, 6mg was given. Pt converted to NSR 90-100s with one dose. BP stable at 125/66. EKG ordered.

## 2022-10-06 NOTE — PROGRESS NOTES
"Pulmonary Progress Note    Date of admission  9/21/2022    Chief Complaint  72 y.o. male admitted 9/21/2022 with [facility]    Hospital Course    \" 72-year-old male with a history of recent BRADLEY and COVID-19 infection July, again PCR positive in September, hypertension, aortic stenosis and follicular lymphoma on chemo/immunotherapy with Bendamustine and Obinutuzumab. He gets his oncologic care in [place] at [facility].  He was admitted on 9/21 with an NSTEMI and worsening shortness of breath with an echocardiogram showing ejection fraction of 20% with inferolateral hypokinesis.  He was admitted to the ICU, a PA catheter was placed and he was started on diuretics underwent evaluation for possible TAVR.  Also started on Decadron, doxycycline, and remdesivir for persistent vs reinfection with COVID. On Acyclovir for ppx. In the ICU was on high flow nasal cannula.      No reference to lung parenchymal disease on CT abd/pelvis at [facility] in 4/2022    CXR due to persistent hypoxia was performed 10/1 showed an incidental right apical pneumothorax with right-sided subcutaneous air extending into the neck as well as patchy bilateral alveolar opacities    CT of the chest 10/1 shows extensive pneumomediastinum with small right apical pneumothorax, persistent unchanged patchy bilateral groundglass opacities\"   -From Dr Hutchins's note     10/3/22 -patient continues to be on high flow nasal cannula, up to 40 L at 80%  10/4 -patient reports overall he is improving, he is on high flow nasal cannula 30 L at 50%.  Overall, patient is feeling better.  He states that he has less short of breath and his nosebleed has improved somewhat.  He states that he was using his incentive spirometer, but has not been using it very much in the last day or so.  He endorses that he has not really gotten out of bed yet.  10/6 -patient had 2 episodes of SVT this morning with hypotension.  He received adenosine twice.  On my evaluation, " he states that he is comfortable.  His FiO2 was turned up during his event, however, his saturations are 100%.  He states that his breathing does not feel any worse today.    Interval Problem Update  Reviewed last 24 hour events:  PTX stable on again   HFNC 50L/60%, but satting 100%   Creatinine worsening    Review of Systems  Review of Systems   Constitutional:  Positive for malaise/fatigue.   HENT:  Positive for congestion. Negative for nosebleeds.    Respiratory:  Positive for cough. Negative for shortness of breath.    Cardiovascular: Negative.    Psychiatric/Behavioral: Negative.        Vital Signs for last 24 hours   Temp:  [36.4 °C (97.5 °F)-36.7 °C (98.1 °F)] 36.7 °C (98.1 °F)  Pulse:  [] 107  Resp:  [17-20] 20  BP: ()/(50-68) 93/54  SpO2:  [91 %-98 %] 98 %    Physical Exam   Physical Exam  Vitals and nursing note reviewed.   Constitutional:       Appearance: He is well-developed. He is ill-appearing.   HENT:      Nose: Nose normal.   Eyes:      Conjunctiva/sclera: Conjunctivae normal.   Neck:      Thyroid: No thyromegaly.      Vascular: No JVD.      Trachea: No tracheal deviation.   Cardiovascular:      Rate and Rhythm: Regular rhythm. Tachycardia present.   Pulmonary:      Effort: Pulmonary effort is normal. No respiratory distress.      Breath sounds: Rhonchi present. No wheezing or rales.   Abdominal:      Palpations: Abdomen is soft.   Musculoskeletal:      Cervical back: Neck supple.   Skin:     General: Skin is warm.      Capillary Refill: Capillary refill takes less than 2 seconds.   Neurological:      Mental Status: He is alert and oriented to person, place, and time.      Motor: No abnormal muscle tone.   Psychiatric:         Behavior: Behavior normal.         Thought Content: Thought content normal.         Judgment: Judgment normal.     Medications  Current Facility-Administered Medications   Medication Dose Route Frequency Provider Last Rate Last Admin    [START ON 10/7/2022]  furosemide (LASIX) tablet 40 mg  40 mg Oral Q DAY Altaf Perdomo D.O.        potassium chloride SA (Kdur) tablet 40 mEq  40 mEq Oral DAILY Altaf Perdomo D.O.   40 mEq at 10/06/22 0519    influenza Vac High-Dose Quad (Fluzone) injection 0.7 mL  0.7 mL Intramuscular Once PRN Altaf Perdomo D.O.        piperacillin-tazobactam (Zosyn) 3.375 g in  mL IVPB  3.375 g Intravenous Q8HRS Destinee Simeon M.D.   Stopped at 10/06/22 0931    sodium chloride (OCEAN) 0.65 % nasal spray 2 Spray  2 Spray Nasal Q2HRS PRN Marita Yee D.O.   2 [place] at 10/04/22 0540    [Held by provider] enoxaparin (Lovenox) inj 40 mg  40 mg Subcutaneous DAILY AT 1800 Abisai Valdez M.D.        menthol (Halls) lozenge 1 Lozenge  1 Lozenge Oral Q2HRS PRN Abisai Valdez M.D.        guaiFENesin dextromethorphan (ROBITUSSIN DM) 100-10 MG/5ML syrup 5 mL  5 mL Oral Q6HRS PRN Abisai Valdez M.D.   5 mL at 10/06/22 0810    lidocaine 2 % nebulizer solution 5 mL  5 mL Nebulization Q6HRS PRN (RT) Abisai Valdez M.D.        hydrALAZINE (APRESOLINE) tablet 10 mg  10 mg Oral Q8HRS Marcos Ball M.D.   10 mg at 10/03/22 0513    acyclovir (Zovirax) tablet 400 mg  400 mg Oral DAILY Abisai Valdez M.D.   400 mg at 10/06/22 0519    senna-docusate (PERICOLACE or SENOKOT S) 8.6-50 MG per tablet 2 Tablet  2 Tablet Oral BID Sally Mondragon M.D.   2 Tablet at 10/04/22 0540    And    polyethylene glycol/lytes (MIRALAX) PACKET 1 Packet  1 Packet Oral QDAY PRN Sally Mondragon M.D.        And    magnesium hydroxide (MILK OF MAGNESIA) suspension 30 mL  30 mL Oral QDAY PRN Sally Mondragon M.D.        And    bisacodyl (DULCOLAX) suppository 10 mg  10 mg Rectal QDAY PRN Sally Mondragon M.D.         Fluids    Intake/Output Summary (Last 24 hours) at 10/6/2022 1115  Last data filed at 10/6/2022 0727  Gross per 24 hour   Intake 189.58 ml   Output 750 ml   Net -560.42  ml       Laboratory          Recent Labs     10/04/22  0300 10/05/22  0515 10/06/22  0350 10/06/22  0812   SODIUM 141 144 140 140   POTASSIUM 3.2* 2.9* 4.1 4.1   CHLORIDE 105 108 109 107   CO2 21 20 19* 17*   BUN 59* 51* 46* 45*   CREATININE 2.13* 1.88* 1.77* 1.97*   MAGNESIUM 1.7 1.7 2.5  --    PHOSPHORUS 3.6 3.6 2.7  --    CALCIUM 8.3* 8.4* 8.6 8.5       Recent Labs     10/04/22  0300 10/05/22  0515 10/06/22  0350 10/06/22  0812   ALTSGPT 35 30 31  --    ASTSGOT 13 11* 18  --    ALKPHOSPHAT 294* 269* 243*  --    TBILIRUBIN 0.7 0.7 0.7  --    GLUCOSE 85 97 105* 109*       Recent Labs     10/04/22  0300 10/05/22  0515 10/06/22  0350   WBC 4.1* 4.4* 3.9*   NEUTSPOLYS 95.00* 94.70* 94.50*   LYMPHOCYTES 1.20* 2.00* 1.80*   MONOCYTES 2.40 2.30 2.60   EOSINOPHILS 0.70 0.50 0.80   BASOPHILS 0.20 0.00 0.00   ASTSGOT 13 11* 18   ALTSGPT 35 30 31   ALKPHOSPHAT 294* 269* 243*   TBILIRUBIN 0.7 0.7 0.7       Recent Labs     10/04/22  0300 10/05/22  0515 10/06/22  0350   RBC 2.49* 2.50* 2.50*   HEMOGLOBIN 7.7* 7.8* 7.8*   HEMATOCRIT 23.1* 22.9* 23.2*   PLATELETCT 44* 57* 55*       Imaging  X-Ray:  I have personally reviewed the images and compared with prior images.      Assessment/Plan    * Acute respiratory failure with hypoxia (HCC)  Assessment & Plan  CT showing persistent bilateral patchy ground glass opacities with basilar consolidations w/ bronchiectasis  Has been attributed to persistent vs recurrent COVID with decadron, remdesivir  Also has received empiric abx with doxycycline  Patient has systolic heart failure with an EF of 20%  Pneumomediastinum unlikely contributing to hypoxemia  ID consulted: sputum cultures, PCP DFA, LDH, 13BDG, EBV and CMV PCR pending   ID work-up unrevealing thus far    Overall, patient is improving, he feels less short of breath and his oxygen requirements have come down, favor the diagnosis of bacterial superinfection    Continue zosyn, ID following  May need to hold Lasix as patient is  having SVT, maybe he is volume down, hospitalist has consulted cardiology  Continue saline and Afrin nasal spray for nosebleed  Continue with incentive spirometry and chest physiotherapy  Okay to continue with cough suppression at this time as patient has pneumomediastinum    Hemoptysis  Assessment & Plan  Improving  Transition to oxymask  Heparin and aspirin on hold, would recommend restarting DVT prophylaxis today  Continue saline nasal spray and Afrin today      Secondary spontaneous pneumothorax  Assessment & Plan  Pneumothorax and subcutaneous emphysema  Suspect due to fits of coughing  Minimal pleural air- clinically stable no resp distress  Stable on serial imaging  Manage conservatively, aggressive cough suppression  Daily CXR, no increase in subcutaneous gas, no pneumothorax seen today       VTE:  consider restarting today     Marita Yee, DO   Pulmonary and Critical Care

## 2022-10-07 LAB
ALBUMIN SERPL BCP-MCNC: 2.6 G/DL (ref 3.2–4.9)
ALBUMIN/GLOB SERPL: 1.2 G/DL
ALP SERPL-CCNC: 180 U/L (ref 30–99)
ALT SERPL-CCNC: 24 U/L (ref 2–50)
ANION GAP SERPL CALC-SCNC: 12 MMOL/L (ref 7–16)
ANION GAP SERPL CALC-SCNC: 14 MMOL/L (ref 7–16)
AST SERPL-CCNC: 16 U/L (ref 12–45)
BASOPHILS # BLD AUTO: 0 % (ref 0–1.8)
BASOPHILS # BLD: 0 K/UL (ref 0–0.12)
BILIRUB SERPL-MCNC: 0.4 MG/DL (ref 0.1–1.5)
BUN SERPL-MCNC: 35 MG/DL (ref 8–22)
BUN SERPL-MCNC: 39 MG/DL (ref 8–22)
CALCIUM SERPL-MCNC: 8.1 MG/DL (ref 8.5–10.5)
CALCIUM SERPL-MCNC: 8.1 MG/DL (ref 8.5–10.5)
CHLORIDE SERPL-SCNC: 106 MMOL/L (ref 96–112)
CHLORIDE SERPL-SCNC: 108 MMOL/L (ref 96–112)
CO2 SERPL-SCNC: 19 MMOL/L (ref 20–33)
CO2 SERPL-SCNC: 19 MMOL/L (ref 20–33)
CREAT SERPL-MCNC: 1.83 MG/DL (ref 0.5–1.4)
CREAT SERPL-MCNC: 1.91 MG/DL (ref 0.5–1.4)
EKG IMPRESSION: NORMAL
EKG IMPRESSION: NORMAL
EOSINOPHIL # BLD AUTO: 0.04 K/UL (ref 0–0.51)
EOSINOPHIL NFR BLD: 2.4 % (ref 0–6.9)
ERYTHROCYTE [DISTWIDTH] IN BLOOD BY AUTOMATED COUNT: 49 FL (ref 35.9–50)
GFR SERPLBLD CREATININE-BSD FMLA CKD-EPI: 36 ML/MIN/1.73 M 2
GFR SERPLBLD CREATININE-BSD FMLA CKD-EPI: 38 ML/MIN/1.73 M 2
GLOBULIN SER CALC-MCNC: 2.2 G/DL (ref 1.9–3.5)
GLUCOSE SERPL-MCNC: 140 MG/DL (ref 65–99)
GLUCOSE SERPL-MCNC: 94 MG/DL (ref 65–99)
HCT VFR BLD AUTO: 21 % (ref 42–52)
HGB BLD-MCNC: 7 G/DL (ref 14–18)
IMM GRANULOCYTES # BLD AUTO: 0.02 K/UL (ref 0–0.11)
IMM GRANULOCYTES NFR BLD AUTO: 1.2 % (ref 0–0.9)
LYMPHOCYTES # BLD AUTO: 0.07 K/UL (ref 1–4.8)
LYMPHOCYTES NFR BLD: 4.2 % (ref 22–41)
MAGNESIUM SERPL-MCNC: 1.9 MG/DL (ref 1.5–2.5)
MCH RBC QN AUTO: 31.1 PG (ref 27–33)
MCHC RBC AUTO-ENTMCNC: 33.3 G/DL (ref 33.7–35.3)
MCV RBC AUTO: 93.3 FL (ref 81.4–97.8)
MONOCYTES # BLD AUTO: 0.07 K/UL (ref 0–0.85)
MONOCYTES NFR BLD AUTO: 4.2 % (ref 0–13.4)
NEUTROPHILS # BLD AUTO: 1.47 K/UL (ref 1.82–7.42)
NEUTROPHILS NFR BLD: 88 % (ref 44–72)
NRBC # BLD AUTO: 0 K/UL
NRBC BLD-RTO: 0 /100 WBC
PHOSPHATE SERPL-MCNC: 2.4 MG/DL (ref 2.5–4.5)
PLATELET # BLD AUTO: 56 K/UL (ref 164–446)
PMV BLD AUTO: 13.5 FL (ref 9–12.9)
POTASSIUM SERPL-SCNC: 3.7 MMOL/L (ref 3.6–5.5)
POTASSIUM SERPL-SCNC: 4.1 MMOL/L (ref 3.6–5.5)
PROT SERPL-MCNC: 4.8 G/DL (ref 6–8.2)
RBC # BLD AUTO: 2.25 M/UL (ref 4.7–6.1)
SODIUM SERPL-SCNC: 139 MMOL/L (ref 135–145)
SODIUM SERPL-SCNC: 139 MMOL/L (ref 135–145)
WBC # BLD AUTO: 1.7 K/UL (ref 4.8–10.8)

## 2022-10-07 PROCEDURE — 80048 BASIC METABOLIC PNL TOTAL CA: CPT

## 2022-10-07 PROCEDURE — 84100 ASSAY OF PHOSPHORUS: CPT

## 2022-10-07 PROCEDURE — A9270 NON-COVERED ITEM OR SERVICE: HCPCS | Performed by: INTERNAL MEDICINE

## 2022-10-07 PROCEDURE — 99233 SBSQ HOSP IP/OBS HIGH 50: CPT | Performed by: INTERNAL MEDICINE

## 2022-10-07 PROCEDURE — 700111 HCHG RX REV CODE 636 W/ 250 OVERRIDE (IP): Performed by: INTERNAL MEDICINE

## 2022-10-07 PROCEDURE — 770020 HCHG ROOM/CARE - TELE (206)

## 2022-10-07 PROCEDURE — 83735 ASSAY OF MAGNESIUM: CPT

## 2022-10-07 PROCEDURE — 94760 N-INVAS EAR/PLS OXIMETRY 1: CPT

## 2022-10-07 PROCEDURE — 80053 COMPREHEN METABOLIC PANEL: CPT

## 2022-10-07 PROCEDURE — 700105 HCHG RX REV CODE 258: Performed by: INTERNAL MEDICINE

## 2022-10-07 PROCEDURE — 36415 COLL VENOUS BLD VENIPUNCTURE: CPT

## 2022-10-07 PROCEDURE — 700102 HCHG RX REV CODE 250 W/ 637 OVERRIDE(OP): Performed by: INTERNAL MEDICINE

## 2022-10-07 PROCEDURE — A9270 NON-COVERED ITEM OR SERVICE: HCPCS | Performed by: NURSE PRACTITIONER

## 2022-10-07 PROCEDURE — 700102 HCHG RX REV CODE 250 W/ 637 OVERRIDE(OP): Performed by: HOSPITALIST

## 2022-10-07 PROCEDURE — 99232 SBSQ HOSP IP/OBS MODERATE 35: CPT | Performed by: HOSPITALIST

## 2022-10-07 PROCEDURE — 85025 COMPLETE CBC W/AUTO DIFF WBC: CPT

## 2022-10-07 PROCEDURE — 94640 AIRWAY INHALATION TREATMENT: CPT

## 2022-10-07 PROCEDURE — 93010 ELECTROCARDIOGRAM REPORT: CPT | Performed by: INTERNAL MEDICINE

## 2022-10-07 PROCEDURE — A9270 NON-COVERED ITEM OR SERVICE: HCPCS | Performed by: HOSPITALIST

## 2022-10-07 PROCEDURE — 700102 HCHG RX REV CODE 250 W/ 637 OVERRIDE(OP): Performed by: NURSE PRACTITIONER

## 2022-10-07 RX ORDER — AMIODARONE HYDROCHLORIDE 200 MG/1
200 TABLET ORAL DAILY
Status: DISCONTINUED | OUTPATIENT
Start: 2022-10-22 | End: 2022-10-12 | Stop reason: HOSPADM

## 2022-10-07 RX ORDER — AMIODARONE HYDROCHLORIDE 200 MG/1
400 TABLET ORAL TWICE DAILY
Status: DISCONTINUED | OUTPATIENT
Start: 2022-10-07 | End: 2022-10-12 | Stop reason: HOSPADM

## 2022-10-07 RX ORDER — HEPARIN SODIUM 5000 [USP'U]/ML
5000 INJECTION, SOLUTION INTRAVENOUS; SUBCUTANEOUS EVERY 8 HOURS
Status: DISCONTINUED | OUTPATIENT
Start: 2022-10-07 | End: 2022-10-09

## 2022-10-07 RX ADMIN — AMIODARONE HYDROCHLORIDE 0.5 MG/MIN: 1.8 INJECTION, SOLUTION INTRAVENOUS at 07:10

## 2022-10-07 RX ADMIN — HYDRALAZINE HYDROCHLORIDE 10 MG: 25 TABLET, FILM COATED ORAL at 12:40

## 2022-10-07 RX ADMIN — AMIODARONE HYDROCHLORIDE 400 MG: 200 TABLET ORAL at 12:40

## 2022-10-07 RX ADMIN — HEPARIN SODIUM 5000 UNITS: 5000 INJECTION, SOLUTION INTRAVENOUS; SUBCUTANEOUS at 21:04

## 2022-10-07 RX ADMIN — GUAIFENESIN SYRUP AND DEXTROMETHORPHAN 5 ML: 100; 10 SYRUP ORAL at 03:59

## 2022-10-07 RX ADMIN — POTASSIUM CHLORIDE 40 MEQ: 1500 TABLET, EXTENDED RELEASE ORAL at 05:34

## 2022-10-07 RX ADMIN — FUROSEMIDE 40 MG: 40 TABLET ORAL at 05:34

## 2022-10-07 RX ADMIN — PIPERACILLIN SODIUM AND TAZOBACTAM SODIUM 3.38 G: 3; .375 INJECTION, POWDER, FOR SOLUTION INTRAVENOUS at 05:34

## 2022-10-07 RX ADMIN — PIPERACILLIN SODIUM AND TAZOBACTAM SODIUM 3.38 G: 3; .375 INJECTION, POWDER, FOR SOLUTION INTRAVENOUS at 21:04

## 2022-10-07 RX ADMIN — ACYCLOVIR 400 MG: 400 TABLET ORAL at 05:35

## 2022-10-07 RX ADMIN — PIPERACILLIN SODIUM AND TAZOBACTAM SODIUM 3.38 G: 3; .375 INJECTION, POWDER, FOR SOLUTION INTRAVENOUS at 13:00

## 2022-10-07 RX ADMIN — GUAIFENESIN SYRUP AND DEXTROMETHORPHAN 5 ML: 100; 10 SYRUP ORAL at 16:25

## 2022-10-07 RX ADMIN — HEPARIN SODIUM 5000 UNITS: 5000 INJECTION, SOLUTION INTRAVENOUS; SUBCUTANEOUS at 14:12

## 2022-10-07 ASSESSMENT — ENCOUNTER SYMPTOMS
ACTIVITY CHANGE: 0
LIGHT-HEADEDNESS: 0
LOSS OF CONSCIOUSNESS: 0
PALPITATIONS: 1
EYE PAIN: 0
FLANK PAIN: 0
SORE THROAT: 0
COLOR CHANGE: 0
CARDIOVASCULAR NEGATIVE: 1
COUGH: 0
DOUBLE VISION: 0
COUGH: 1
SEIZURES: 0
HEADACHES: 0
DIZZINESS: 0
TREMORS: 0
DIAPHORESIS: 0
JOINT SWELLING: 0
EYE ITCHING: 0
BACK PAIN: 0
EYE DISCHARGE: 0
SPEECH DIFFICULTY: 0
SPUTUM PRODUCTION: 1
WEAKNESS: 0
VOMITING: 0
SHORTNESS OF BREATH: 0
ADENOPATHY: 0
NAUSEA: 0
ORTHOPNEA: 0
SHORTNESS OF BREATH: 1
DECREASED CONCENTRATION: 0
CHILLS: 0
AGITATION: 0
MYALGIAS: 0
PALPITATIONS: 0
ABDOMINAL PAIN: 0
BRUISES/BLEEDS EASILY: 0
BLURRED VISION: 0
EYE REDNESS: 0
NERVOUS/ANXIOUS: 0
ABDOMINAL DISTENTION: 0
HALLUCINATIONS: 0
PSYCHIATRIC NEGATIVE: 1
CHOKING: 0
POLYPHAGIA: 0
FEVER: 0
CHEST TIGHTNESS: 0
APPETITE CHANGE: 0
POLYDIPSIA: 0
STRIDOR: 0
NUMBNESS: 0
CONFUSION: 0
CONSTIPATION: 0
DIARRHEA: 0

## 2022-10-07 ASSESSMENT — PAIN DESCRIPTION - PAIN TYPE
TYPE: ACUTE PAIN
TYPE: ACUTE PAIN

## 2022-10-07 ASSESSMENT — FIBROSIS 4 INDEX: FIB4 SCORE: 4.26

## 2022-10-07 NOTE — PROGRESS NOTES
"Pulmonary Progress Note    Date of admission  9/21/2022    Chief Complaint  72 y.o. male admitted 9/21/2022 with [facility] [place]    Hospital Course    \" 72-year-old male with a history of recent BRADLEY and COVID-19 infection July, again PCR positive in September, hypertension, aortic stenosis and follicular lymphoma on chemo/immunotherapy with Bendamustine and Obinutuzumab. He gets his oncologic care in [place] at [facility].  He was admitted on 9/21 with an NSTEMI and worsening shortness of breath with an echocardiogram showing ejection fraction of 20% with inferolateral hypokinesis.  He was admitted to the ICU, a PA catheter was placed and he was started on diuretics underwent evaluation for possible TAVR.  Also started on Decadron, doxycycline, and remdesivir for persistent vs reinfection with COVID. On Acyclovir for ppx. In the ICU was on high flow nasal cannula.      No reference to lung parenchymal disease on CT abd/pelvis at [facility] in 4/2022    CXR due to persistent hypoxia was performed 10/1 showed an incidental right apical pneumothorax with right-sided subcutaneous air extending into the neck as well as patchy bilateral alveolar opacities    CT of the chest 10/1 shows extensive pneumomediastinum with small right apical pneumothorax, persistent unchanged patchy bilateral groundglass opacities\"   -From Dr Hutchins's note     10/3/22 -patient continues to be on high flow nasal cannula, up to 40 L at 80%  10/4 -patient reports overall he is improving, he is on high flow nasal cannula 30 L at 50%.  Overall, patient is feeling better.  He states that he has less short of breath and his nosebleed has improved somewhat.  He states that he was using his incentive spirometer, but has not been using it very much in the last day or so.  He endorses that he has not really gotten out of bed yet.  10/6 -patient had 2 episodes of SVT this morning with hypotension.  He received adenosine twice.  On my evaluation, " he states that he is comfortable.  His FiO2 was turned up during his event, however, his saturations are 100%.  He states that his breathing does not feel any worse today.  10/7 -patient reports that he is feeling better today.  He was started on amiodarone yesterday.  He states that he has not had any additional nosebleeding.  He feels that he is getting better slowly.    Interval Problem Update  Reviewed last 24 hour events:  HFNC 40/55%  Creatinine worsening    Review of Systems  Review of Systems   Constitutional:  Positive for malaise/fatigue.   HENT:  Positive for congestion. Negative for nosebleeds.    Respiratory:  Positive for cough. Negative for shortness of breath.    Cardiovascular: Negative.    Psychiatric/Behavioral: Negative.        Vital Signs for last 24 hours   Temp:  [36.3 °C (97.3 °F)-36.7 °C (98.1 °F)] 36.7 °C (98.1 °F)  Pulse:  [] 74  Resp:  [16-20] 16  BP: ()/(41-60) 97/44  SpO2:  [90 %-100 %] 90 %    Physical Exam   Physical Exam  Vitals and nursing note reviewed.   Constitutional:       Appearance: He is well-developed. He is ill-appearing.   HENT:      Nose: Nose normal.   Eyes:      Conjunctiva/sclera: Conjunctivae normal.   Neck:      Thyroid: No thyromegaly.      Vascular: No JVD.      Trachea: No tracheal deviation.   Cardiovascular:      Rate and Rhythm: Normal rate and regular rhythm.   Pulmonary:      Effort: Pulmonary effort is normal. No respiratory distress.      Breath sounds: Rhonchi present. No wheezing or rales.   Abdominal:      Palpations: Abdomen is soft.   Skin:     General: Skin is warm.   Neurological:      Mental Status: He is alert and oriented to person, place, and time.      Motor: No abnormal muscle tone.   Psychiatric:         Behavior: Behavior normal.         Thought Content: Thought content normal.         Judgment: Judgment normal.     Medications  Current Facility-Administered Medications   Medication Dose Route Frequency Provider Last Rate Last  Admin    amiodarone (Cordarone) tablet 400 mg  400 mg Oral TWICE DAILY PACHECO KellerP.RNatyNNaty        [START ON 10/22/2022] amiodarone (Cordarone) tablet 200 mg  200 mg Oral DAILY PACHECO KellerPNatyRBLADIMIR        furosemide (LASIX) tablet 40 mg  40 mg Oral Q DAY VARGAS CullenONaty   40 mg at 10/07/22 0534    amiodarone (Nexterone) 360 mg/200 mL infusion  0.5-1 mg/min Intravenous Continuous PACHECO KellerP.RNatyN. 17 mL/hr at 10/07/22 0710 0.5 mg/min at 10/07/22 0710    potassium chloride SA (Kdur) tablet 40 mEq  40 mEq Oral DAILY VARGAS CullenONaty   40 mEq at 10/07/22 0534    influenza Vac High-Dose Quad (Fluzone) injection 0.7 mL  0.7 mL Intramuscular Once PRN RYLAND Cullen.ONaty        piperacillin-tazobactam (Zosyn) 3.375 g in  mL IVPB  3.375 g Intravenous Q8HRS Destinee Simeon M.D.   Stopped at 10/07/22 0934    sodium chloride (OCEAN) 0.65 % nasal spray 2 Spray  2 Spray Nasal Q2HRS PRN Marita Yee D.O.   2 [place] at 10/04/22 0540    [Held by provider] enoxaparin (Lovenox) inj 40 mg  40 mg Subcutaneous DAILY AT 1800 Abisai Valdez M.D.        menthol ([place]) lozenge 1 Lozenge  1 Lozenge Oral Q2HRS PRN Abisai Valdez M.D.   1 Lozenge at 10/06/22 2133    guaiFENesin dextromethorphan (ROBITUSSIN DM) 100-10 MG/5ML syrup 5 mL  5 mL Oral Q6HRS PRN Abisai Valdez M.D.   5 mL at 10/07/22 0359    lidocaine 2 % nebulizer solution 5 mL  5 mL Nebulization Q6HRS PRN (RT) Abisai Valdez M.D.        hydrALAZINE (APRESOLINE) tablet 10 mg  10 mg Oral Q8HRS Marcos Ball M.D.   10 mg at 10/03/22 0513    acyclovir (Zovirax) tablet 400 mg  400 mg Oral DAILY Abisai Valdez M.D.   400 mg at 10/07/22 0535    senna-docusate (PERICOLACE or SENOKOT S) 8.6-50 MG per tablet 2 Tablet  2 Tablet Oral BID Sally Mondragon M.D.   2 Tablet at 10/04/22 0540    And    polyethylene glycol/lytes (MIRALAX) PACKET 1 Packet  1 Packet Oral QDAY  PRN Sally Mondragon M.D.        And    magnesium hydroxide (MILK OF MAGNESIA) suspension 30 mL  30 mL Oral QDAY PRNELSON Mondragon M.D.        And    bisacodyl (DULCOLAX) suppository 10 mg  10 mg Rectal QDAY PRN Sally Mondragon M.D.         Fluids    Intake/Output Summary (Last 24 hours) at 10/7/2022 1024  Last data filed at 10/7/2022 0936  Gross per 24 hour   Intake 620 ml   Output 1400 ml   Net -780 ml       Laboratory          Recent Labs     10/05/22  0515 10/06/22  0350 10/06/22  0812 10/06/22  1048 10/07/22  0415   SODIUM 144 140 140 143 139   POTASSIUM 2.9* 4.1 4.1 3.6 3.7   CHLORIDE 108 109 107 116* 108   CO2 20 19* 17* 15* 19*   BUN 51* 46* 45* 38* 39*   CREATININE 1.88* 1.77* 1.97* 1.56* 1.91*   MAGNESIUM 1.7 2.5  --  1.9 1.9   PHOSPHORUS 3.6 2.7  --   --  2.4*   CALCIUM 8.4* 8.6 8.5 7.0* 8.1*       Recent Labs     10/05/22  0515 10/06/22  0350 10/06/22  0812 10/06/22  1048 10/07/22  0415   ALTSGPT 30 31  --   --  24   ASTSGOT 11* 18  --   --  16   ALKPHOSPHAT 269* 243*  --   --  180*   TBILIRUBIN 0.7 0.7  --   --  0.4   GLUCOSE 97 105* 109* 82 94       Recent Labs     10/05/22  0515 10/06/22  0350 10/07/22  0415   WBC 4.4* 3.9* 1.7*   NEUTSPOLYS 94.70* 94.50* 88.00*   LYMPHOCYTES 2.00* 1.80* 4.20*   MONOCYTES 2.30 2.60 4.20   EOSINOPHILS 0.50 0.80 2.40   BASOPHILS 0.00 0.00 0.00   ASTSGOT 11* 18 16   ALTSGPT 30 31 24   ALKPHOSPHAT 269* 243* 180*   TBILIRUBIN 0.7 0.7 0.4       Recent Labs     10/05/22  0515 10/06/22  0350 10/07/22  0415   RBC 2.50* 2.50* 2.25*   HEMOGLOBIN 7.8* 7.8* 7.0*   HEMATOCRIT 22.9* 23.2* 21.0*   PLATELETCT 57* 55* 56*       Imaging  X-Ray:  No film today      Assessment/Plan    * Acute respiratory failure with hypoxia (HCC)  Assessment & Plan  Likely due to COVID and superimposed pneumonia     CT showing persistent bilateral patchy ground glass opacities with basilar consolidations w/ bronchiectasis  Patient has systolic heart failure with an EF of  20%  Pneumomediastinum unlikely contributing to hypoxemia    Overall, patient is improving, he feels less short of breath and his oxygen requirements have come down, favor the diagnosis of bacterial superinfection    Continue zosyn, ID following, day 5/7   Defer lasix management to cards and primary   Continue saline and Afrin nasal spray for nosebleed  Continue with incentive spirometry and chest physiotherapy  Okay to continue with cough suppression at this time as patient has pneumomediastinum    Hemoptysis  Assessment & Plan  Due to HFNC   Heparin and aspirin on hold  Restarted DVT prophylaxis yesterday  Continue saline nasal spray and Afrin today      Secondary spontaneous pneumothorax  Assessment & Plan  Pneumothorax and subcutaneous emphysema  Suspect due to fits of coughing  Minimal pleural air- clinically stable no resp distress  Stable on serial imaging  Manage conservatively, aggressive cough suppression  Daily CXR, no increase in subcutaneous gas, no pneumothorax seen today       VTE: Hilda Yee, DO   Pulmonary and Critical Care

## 2022-10-07 NOTE — PROGRESS NOTES
Cardiology Follow Up Progress Note    Date of Service  10/7/2022    Attending Physician  Altaf Perdomo, *    Chief Complaint   AVNRT    LIEN Fitch is a 72 y.o. 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   Eyes:  Negative for pain, discharge, redness and itching.   Respiratory:  Positive for shortness of breath. Negative for cough, choking, chest tightness and stridor.    Cardiovascular:  Negative for palpitations.   Gastrointestinal:  Negative for abdominal distention, abdominal pain, constipation, diarrhea and nausea.   Endocrine: Negative for cold intolerance, heat intolerance, polydipsia and polyphagia.   Genitourinary:  Negative for difficulty urinating, dysuria, enuresis, flank pain, frequency, genital sores and hematuria.   Musculoskeletal:  Negative for back pain, gait problem, joint swelling and myalgias.   Skin:  Negative for color change, pallor and rash.   Neurological:  Negative for tremors, seizures, syncope, speech difficulty, weakness, light-headedness, numbness and headaches.   Hematological:  Negative for adenopathy. Does not bruise/bleed easily.   Psychiatric/Behavioral:  Negative for agitation, behavioral problems, confusion, decreased concentration and hallucinations. The patient is not nervous/anxious.      Vital signs in last 24 hours  Temp:  [36.3 °C (97.3 °F)-36.7 °C (98.1 °F)] 36.7 °C (98.1 °F)  Pulse:  [] 74  Resp:  [16-20] 16  BP: ()/(41-60) 97/44  SpO2:  [90 %-100 %] 90 %    Physical Exam  Physical Exam  Vitals and nursing note reviewed.   Constitutional:       General: He is not in acute distress.     Appearance: Normal appearance. He is normal weight. He is not ill-appearing, toxic-appearing or diaphoretic.   HENT:      Head: Normocephalic  and atraumatic.      Right Ear: Ear canal and external ear normal.      Left Ear: Ear canal and external ear normal.      Nose: Nose normal. No congestion or rhinorrhea.      Mouth/Throat:      Mouth: Mucous membranes are moist.      Pharynx: Oropharynx is clear. No oropharyngeal exudate or posterior oropharyngeal erythema.   Eyes:      General: No scleral icterus.        Right eye: No discharge.         Left eye: No discharge.      Extraocular Movements: Extraocular movements intact.      Conjunctiva/sclera: Conjunctivae normal.      Pupils: Pupils are equal, round, and reactive to light.   Neck:      Vascular: No carotid bruit.   Cardiovascular:      Rate and Rhythm: Normal rate and regular rhythm.      Pulses: Normal pulses.      Heart sounds: Normal heart sounds. No murmur heard.    No gallop.   Pulmonary:      Effort: Pulmonary effort is normal. No respiratory distress.      Breath sounds: Normal breath sounds. No stridor. No wheezing, rhonchi or rales.   Abdominal:      General: Abdomen is flat. Bowel sounds are normal. There is no distension.      Palpations: Abdomen is soft. There is no mass.      Tenderness: There is no abdominal tenderness. There is no guarding or rebound.      Hernia: No hernia is present.   Musculoskeletal:         General: No swelling or tenderness. Normal range of motion.      Cervical back: Normal range of motion and neck supple. No rigidity. No muscular tenderness.      Right lower leg: No edema.      Left lower leg: No edema.   Lymphadenopathy:      Cervical: No cervical adenopathy.   Skin:     General: Skin is warm and dry.      Capillary Refill: Capillary refill takes 2 to 3 seconds.      Coloration: Skin is not jaundiced or pale.      Findings: No bruising or lesion.   Neurological:      General: No focal deficit present.      Mental Status: He is alert and oriented to person, place, and time. Mental status is at baseline.      Cranial Nerves: No cranial nerve deficit.       Motor: No weakness.   Psychiatric:         Mood and Affect: Mood normal.         Behavior: Behavior normal.         Thought Content: Thought content normal.         Judgment: Judgment normal.       Lab Review  Lab Results   Component Value Date/Time    WBC 1.7 (LL) 10/07/2022 04:15 AM    RBC 2.25 (L) 10/07/2022 04:15 AM    HEMOGLOBIN 7.0 (L) 10/07/2022 04:15 AM    HEMATOCRIT 21.0 (L) 10/07/2022 04:15 AM    MCV 93.3 10/07/2022 04:15 AM    MCH 31.1 10/07/2022 04:15 AM    MCHC 33.3 (L) 10/07/2022 04:15 AM    MPV 13.5 (H) 10/07/2022 04:15 AM      Lab Results   Component Value Date/Time    SODIUM 139 10/07/2022 04:15 AM    POTASSIUM 3.7 10/07/2022 04:15 AM    CHLORIDE 108 10/07/2022 04:15 AM    CO2 19 (L) 10/07/2022 04:15 AM    GLUCOSE 94 10/07/2022 04:15 AM    BUN 39 (H) 10/07/2022 04:15 AM    CREATININE 1.91 (H) 10/07/2022 04:15 AM      Lab Results   Component Value Date/Time    ASTSGOT 16 10/07/2022 04:15 AM    ALTSGPT 24 10/07/2022 04:15 AM     Lab Results   Component Value Date/Time    CHOLSTRLTOT 144 03/11/2022 08:50 AM    LDL 57 03/11/2022 08:50 AM    HDL 73 (H) 03/11/2022 08:50 AM    TRIGLYCERIDE 112 03/11/2022 08:50 AM    TROPONINT 579 (H) 09/21/2022 10:08 PM       No results for input(s): NTPROBNP in the last 72 hours.    Cardiac Imaging and Procedures Review  EKG:  My personal interpretation of the EKG dated 10/6/2022 is 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     Echocardiogram: Dated 9/22/2022 personally viewed inter myself showing an EF of 20% inferior infarct possible low-flow low gradient aortic stenosis.     Cardiac Catheterization:  NA     Imaging  Chest X-Ray: Dated 10/6/2022 personally viewed inter myself showing bilateral scarring pulm edema and infiltrates.  No change from prior.     Stress Test: N/A    Assessment/Plan  No new Assessment & Plan notes have been filed under this hospital service since the last note was generated.  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  We will continue him on the amiodarone and stop the drip at 24 hours.  Then he will start on amiodarone 400 twice daily.  Cardiology will sign off at this point.  Please feel free to reconsult should the clinical situation change or you have further questions.    Thank you for allowing me to participate in the care of this patient.  Cardiology will sign off on this patient    Please contact me with any questions.    Joselito Michel M.D.   Cardiologist, [facility]  [phone]

## 2022-10-07 NOTE — CARE PLAN
The patient is Watcher - Medium risk of patient condition declining or worsening    Shift Goals  Clinical Goals: Pt will maintain NSR. Pt will maintain o2 SAT >89% on current HF settings of 60LPM at 100%  Patient Goals: Pt will report improvement in cough  Family Goals: N/A      Problem: Hemodynamics  Goal: Patient's hemodynamics, fluid balance and neurologic status will be stable or improve  Outcome: Progressing     Problem: Infection - Standard  Goal: Patient will remain free from infection  Outcome: Progressing     Problem: Respiratory  Goal: Patient will achieve/maintain optimum respiratory ventilation and gas exchange  Outcome: Progressing       Progress made toward(s) clinical / shift goals: Pt has been NSR since 1900 with no PVCs. Pt remains on amiodarone drip per MAR. Lozenge administered for occasional cough. Pt O2 sat > 90% on 60LPM 100% HF. Will continue to monitor.    Patient is not progressing towards the following goals: N/A

## 2022-10-07 NOTE — DIETARY
"Nutrition Services Brief Update:  Day 16 of admit.  Ceferino Fitch is a 72 y.o. male with admitting DX of NSTEMI (non-ST elevated myocardial infarction) ([facility]) [I21.4]    Current Diet: Regular diet in place, boost VHC BID and boost plus 1x/day.  PO intake has been < 25% of most meals recently.     I visited with patient at bedside this afternoon.  He reports that appetite is okay but that he notes it's \"physically hard to eat\" related to difficulty chewing.  Oxymask in place.  Discussed soft and easy to eat foods and supplements.  Multiple snacks at bedside noted.  He reports that he drinks ~ half of the boost supplements and is agreeable to continue receiving these.  Encouraged PO intake and discussed importance of nutrition.  Patient in agreement.    Discussed patient with RN who confirms patient is eating poorly.  She does suspect that patient would be agreeable to tube feeding.     Problem: Nutritional:  Goal: Achieve adequate nutritional intake  Description: Patient will consume >50% of meals and supplements.  Outcome: No met        RD monitoring.   Recommend consideration for supplemental tube feeds should patients PO intake continue to be poor.   "

## 2022-10-07 NOTE — PROGRESS NOTES
Hospital Medicine Daily Progress Note    Date of Service  10/7/2022    Chief Complaint  Ceferino Fitch is a 72 y.o. male admitted 9/21/2022 with SOB    Hospital Course  73yo PMHx HTN, NHL on immunotherapy ([place], obinutuzumab), gout, HLD, CKD, HTN.  + COVID 9/13.   TTE LVEF 20% inf/inf/lat hypokinesisi, severe AS.  Has been treated with decadron and Remdesivir on this admission    Interval Problem Update  Feeling better today though fatigued this afternoon as he got out of bed to a chair this am.  [place] very SOB at that time    On mask at 6 L  SBP   AFebrile  UOP 1700ml/24hrs on 40mg po lasix, neg 1050ml/24hrs    I have discussed this patient's plan of care and discharge plan at IDT rounds today with Case Management, Nursing, Nursing leadership, and other members of the IDT team.    Consultants/Specialty  cardiology, infectious disease, and pulmonary    Code Status  Full Code    Disposition  Patient is not medically cleared for discharge.   Anticipate discharge to to home with close outpatient follow-up.  I have placed the appropriate orders for post-discharge needs.    Review of Systems  Review of Systems   Constitutional:  Negative for chills and fever.   HENT:  Negative for nosebleeds and sore throat.    Eyes:  Negative for blurred vision and double vision.   Respiratory:  Positive for cough, sputum production and shortness of breath.    Cardiovascular:  Positive for palpitations. Negative for chest pain, orthopnea and leg swelling.   Gastrointestinal:  Negative for abdominal pain, diarrhea, nausea and vomiting.   Genitourinary:  Negative for dysuria and urgency.   Musculoskeletal:  Negative for back pain.   Skin:  Negative for rash.   Neurological:  Negative for dizziness, loss of consciousness and headaches.      Physical Exam  Temp:  [36.2 °C (97.2 °F)-36.7 °C (98.1 °F)] 36.6 °C (97.9 °F)  Pulse:  [74-91] 88  Resp:  [16-18] 16  BP: ()/(44-51) 98/46  SpO2:  [90 %-100 %] 93 %    Physical  Exam  Constitutional:       General: He is not in acute distress.     Appearance: Normal appearance. He is well-developed. He is not diaphoretic.   HENT:      Head: Normocephalic and atraumatic.   Eyes:      Conjunctiva/sclera: Conjunctivae normal.   Neck:      Vascular: No JVD.   Cardiovascular:      Rate and Rhythm: Regular rhythm. Tachycardia present.      Heart sounds: No murmur heard.    No gallop.   Pulmonary:      Effort: Pulmonary effort is normal. No respiratory distress.      Breath sounds: No stridor. Rhonchi present. No wheezing or rales.   Abdominal:      Palpations: Abdomen is soft.      Tenderness: There is no abdominal tenderness. There is no guarding or rebound.   Musculoskeletal:         General: No tenderness.      Right lower leg: No edema.      Left lower leg: No edema.   Skin:     General: Skin is warm and dry.      Capillary Refill: Capillary refill takes less than 2 seconds.      Findings: No rash.   Neurological:      Mental Status: He is alert and oriented to person, place, and time.   Psychiatric:         Mood and Affect: Mood normal.         Behavior: Behavior normal.         Thought Content: Thought content normal.       Fluids    Intake/Output Summary (Last 24 hours) at 10/7/2022 1627  Last data filed at 10/7/2022 1112  Gross per 24 hour   Intake 470 ml   Output 1100 ml   Net -630 ml         Laboratory  Recent Labs     10/05/22  0515 10/06/22  0350 10/07/22  0415   WBC 4.4* 3.9* 1.7*   RBC 2.50* 2.50* 2.25*   HEMOGLOBIN 7.8* 7.8* 7.0*   HEMATOCRIT 22.9* 23.2* 21.0*   MCV 91.6 92.8 93.3   MCH 31.2 31.2 31.1   MCHC 34.1 33.6* 33.3*   RDW 47.9 48.6 49.0   PLATELETCT 57* 55* 56*   MPV 13.2* 12.2 13.5*       Recent Labs     10/06/22  1048 10/07/22  0415 10/07/22  1008   SODIUM 143 139 139   POTASSIUM 3.6 3.7 4.1   CHLORIDE 116* 108 106   CO2 15* 19* 19*   GLUCOSE 82 94 140*   BUN 38* 39* 35*   CREATININE 1.56* 1.91* 1.83*   CALCIUM 7.0* 8.1* 8.1*                      Imaging  DX-CHEST-PORTABLE (1 VIEW)   Final Result         1.  Pulmonary edema and/or infiltrates are identified, which are stable since the prior exam.   2.  Soft tissue gas in the chest wall and neck, somewhat increased since prior study.   3.  Atherosclerosis      DX-CHEST-PORTABLE (1 VIEW)   Final Result         1.  Pulmonary edema and/or infiltrates are identified, which are stable since the prior exam.   2.  Soft tissue gas in the chest wall and neck, increased on the left since prior study..   3.  Atherosclerosis      DX-CHEST-PORTABLE (1 VIEW)   Final Result         1.  Pulmonary edema and/or infiltrates are identified, which are stable since the prior exam.   2.  Right chest wall and neck soft tissue gas.   3.  Atherosclerosis      DX-CHEST-PORTABLE (1 VIEW)   Final Result      1.  No visible pneumothorax. This could be obscured by the patient's pneumomediastinum and soft tissue gas.   2.  Unchanged BILATERAL pulmonary opacities      DX-CHEST-PORTABLE (1 VIEW)   Final Result      1.  No visible pneumothorax. This could be obscured by the patient's pneumomediastinum and soft tissue gas.   2.  Unchanged BILATERAL pulmonary opacities      CT-CHEST (THORAX) W/O   Final Result      1.  Extensive pneumomediastinum with air within the soft tissues of the right neck and chest.   2.  Small right pneumothorax.   3.  Patchy extensive bilateral groundglass opacities with more dependent consolidative opacities are nonspecific, Covid Pneumonia versus other infectious/inflammatory etiology   4.  Sclerotic lesions in the right T11 and T12 levels in keeping with sclerotic osseous metastases   These findings were discussed with PROSPER BOWMAN III on 10/1/2022 4:49 PM.      Fleischner Society pulmonary nodule recommendations:   Not Applicable         DX-CHEST-PORTABLE (1 VIEW)   Final Result      1.  There is a new small right apical pneumothorax with new right-sided subcutaneous air extending into the neck. Question of  a small amount of mediastinal air.   2.  Interval increase in patchy lower lobe opacities which could be atelectasis or pneumonitis or edema.      3.  Findings were discussed with Eleno, the patient's nurse at 2:58 PM on 10/1/2022 on 10/1/2022 2:58 PM. He states he will give this message to the physician taking care of the patient.      DX-CHEST-PORTABLE (1 VIEW)   Final Result      1.  Right-sided Higgins-Frannie catheter tip projects in the distal right main pulmonary artery region. No pneumothorax.   2.  Mild interval worsening in hazy bilateral pulmonary opacities.         DX-CHEST-PORTABLE (1 VIEW)   Final Result      1.  Right-sided Higgins-Frannie catheter tip projects in the distal right main pulmonary artery region. No pneumothorax.   2.  Probable slight improvement in hazy bilateral pulmonary opacities.      DX-CHEST-PORTABLE (1 VIEW)   Final Result      1.  Right-sided PICC line unchanged in position.      2.  Increased interstitial markings throughout the lung fields especially in the perihilar regions consistent with pulmonary edema and/or pneumonitis.      CT-TAVR CHEST-ABD-PELVIS W/WO POST PROCESS   Final Result      1.  Limited by lack of intravenous contrast      2.  Aortic valve Agatston score 4565 and the annulus measures approximately 450 sq mm      3.  Coronary arteries originate over a centimeter above the annulus      4.  Severe atherosclerotic change with marked narrowing of the iliofemoral runoff, right worse than left      5.  Multifocal groundglass greater than bronchial thickening is compatible with known Covid pneumonia. There is also evidence of lower lung zone scarring, possible interstitial lung disease and small pleural fluid      6.  Colonic diverticulosis, emphysema, left heart enlargement      DX-CHEST-PORTABLE (1 VIEW)   Final Result      Unchanged BILATERAL pneumonia      EC-ECHOCARDIOGRAM COMPLETE W/O CONT   Final Result      DX-CHEST-LIMITED (1 VIEW)   Final Result      1.  Multifocal  pneumonia. Pulmonary edema could have a similar appearance.      US-RUQ   Final Result      Unremarkable RIGHT upper quadrant ultrasound             Assessment/Plan  * Acute respiratory failure with hypoxia (HCC)  Assessment & Plan  Respiratory failure in setting of covid -19 vs ? HF   Incentive spirometry  Currently he is on high flow oxygen to maintain oxygen saturation.   Pulm following  Has been treated for COVID with decadron and Remdesivir  ?secondary infectious process: ID consulted and following  ?steroids: defer to pulm  Currently on Pip/Tazo  Acyclovir is for prophylaxis  Has been on IV lasix with significant neg fluid balance.  Now on maintenance po lasix  Now off HFNC    SVT (supraventricular tachycardia) (HCC)  Assessment & Plan  Requiring cardioversion with adenosine 6mg   BPs are soft precluding BB or CCB  Concerned with an LVEF of 20% and severe AS he will not tolerate this dysrythmia well  Cards consulted  On amiodarone load    Secondary spontaneous pneumothorax  Assessment & Plan  He found to have a pneumothorax on chest x-ray October 02, 2022.  Pulmonary and infectious disease consulted.  Continue to monitor closely.  Daily chest x-ray.    Heart failure with reduced ejection fraction (HCC)  Assessment & Plan  Echo showed severely reduced ejection fraction   Cardiology is following him.  Monitor volume status; on IV lasix  Need to be cautious with diuresis given AS  Afterload reduction on hydralazine  Initiate GDMT as pressures allow  Will reconsult Cards for Dobutamine stress test once off HFNC    Advance care planning  Assessment & Plan  Discussed by prior treatment team.  had a prolonged discussion with the patient regarding goals of care, diagnoses, prognosis, and CODE STATUS. We discussed his   prognosis and comorbidities. I spent  16  minutes on advanced care planning in addition to the time spent managing the other medical problems.    He requested to remain full code      Elevated liver  enzymes  Assessment & Plan  Hepatitis panel came back negative.  right upper quadrant ultrasound did not show any acute abnormalities.  Could be secondary to remdesivir or congestive etiology  AST/ALT now in normal range    Elevated brain natriuretic peptide (BNP) level  Assessment & Plan  Likely in setting of COVID-19 infection  Euvolemic on exam  Echo showed reduced ejection fraction.    CKD (chronic kidney disease)  Assessment & Plan  Renal function has been fluctuating.  Increased BUN and creatinine this morning.  Continue to monitor  Renally dose medications as appropriate    Pancytopenia (HCC)  Assessment & Plan  In setting of immunotherapy  White blood cell count has been fluctuating.  Continue to monitor    Hypokalemia  Assessment & Plan  Follow daily  Replacing Mg      Low bicarbonate  Assessment & Plan  In setting of CKD  Continue to monitor.    COVID  Assessment & Plan    Activate sepsis protocol  COVID Isolation  He was placed on oxygen nasal cannula but he developed hypoxia again and he was placed on high flow nasal cannula.  Completed Decadron last dose October 1, 2022  He completed course of remdesivir.  Repeat COVID testing ordered by pulmonologist came back positive.  Monitor oxygen saturation closely      NSTEMI (non-ST elevated myocardial infarction) (HCC)- (present on admission)  Assessment & Plan    Echo showing ef of 20% probable severer Aortic stenosis  Cardiology evaluated him and recommended to reconsult for possible stress echo when he is more stable.  No symptoms of acute chest pain.      Essential hypertension, benign- (present on admission)  Assessment & Plan  Mild hypotension.  Continue to monitor.    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    last immunotherapy infusion to treat his cancer in [place] on 09/09/2022  Follow-up with outpatient       VTE prophylaxis: enoxaparin  ppx    I have performed a physical exam and reviewed and updated ROS and Plan today (10/7/2022). In review of yesterday's note (10/6/2022), there are no changes except as documented above.

## 2022-10-07 NOTE — DISCHARGE PLANNING
Case Management Discharge Planning    Admission Date: 9/21/2022  GMLOS: 5.4  ALOS: 16    6-Clicks ADL Score: 16  6-Clicks Mobility Score: 14  PT and/or OT Eval ordered: Yes  Post-acute Referrals Ordered: Yes  Post-acute Choice Obtained: Yes  Has referral(s) been sent to post-acute provider:  Yes      Anticipated Discharge Dispo: Discharge Disposition: D/T to Medicare cert LTCH w/planned hosp IP readmit (75)  Discharge Address: [address] [place] [address]  Discharge Contact Phone Number: [phone]    DME Needed: No    Action(s) Taken: Case discussed with Dr. Perdomo, LTAC referrals are ok to send now.      RN CM met with the patient at bedside to discuss the discharge plan.  Patient's O2 has been deescalated from high flow to oxy mask.  Bedside MARCOS Carver clarified that the patient still desaturates hard with activity.  RN CM explained that a two prong plan of SNF vs LTAC is appropriate at this time, patient verbalized understanding.  Patient consented to sending referrals to [facility], [facility], and all [place] SNFs.  Patient explained that [facility] would be his first choice since he lives in [place].  Choice forms faxed to MARI Alvarez.    Escalations Completed: Provider    Medically Clear: No    Next Steps: Care coordination to follow up with SNF and LTAC referrals.    Barriers to Discharge: Medical clearance, pending placement    Is the patient up for discharge tomorrow: No

## 2022-10-08 LAB
ABO + RH BLD: NORMAL
ABO GROUP BLD: NORMAL
ALBUMIN SERPL BCP-MCNC: 2.9 G/DL (ref 3.2–4.9)
ALBUMIN/GLOB SERPL: 1.2 G/DL
ALP SERPL-CCNC: 172 U/L (ref 30–99)
ALT SERPL-CCNC: 26 U/L (ref 2–50)
ANION GAP SERPL CALC-SCNC: 11 MMOL/L (ref 7–16)
AST SERPL-CCNC: 18 U/L (ref 12–45)
BARCODED ABORH UBTYP: 9500
BARCODED PRD CODE UBPRD: NORMAL
BARCODED UNIT NUM UBUNT: NORMAL
BASOPHILS # BLD AUTO: 0 % (ref 0–1.8)
BASOPHILS # BLD: 0 K/UL (ref 0–0.12)
BILIRUB SERPL-MCNC: 0.4 MG/DL (ref 0.1–1.5)
BLD GP AB SCN SERPL QL: NORMAL
BUN SERPL-MCNC: 31 MG/DL (ref 8–22)
C IMMITIS AB SER QL ID: NOT DETECTED
CALCIUM SERPL-MCNC: 8.2 MG/DL (ref 8.5–10.5)
CHLORIDE SERPL-SCNC: 107 MMOL/L (ref 96–112)
CMV DNA # SERPL NAA+PROBE: <390 CPY/ML
CMV DNA SERPL NAA+PROBE-ACNC: <227 IU/ML
CMV DNA SERPL NAA+PROBE-LOG IU: <2.4 LOG IU/ML
CMV DNA SERPL NAA+PROBE-LOG#: <2.6 LOG CPY/ML
CMV GENE MUT DET ISLT: NOT DETECTED
CMV PCR SOURCE Q4398: NORMAL
CO2 SERPL-SCNC: 20 MMOL/L (ref 20–33)
COCCIDIOIDES AB TITR SER CF: NORMAL {TITER}
COCCIDIOIDES IGG SER-ACNC: 0.1 IV
COCCIDIOIDES IGM SERPL-ACNC: 0.1 IV
COMPONENT R 8504R: NORMAL
CREAT SERPL-MCNC: 1.8 MG/DL (ref 0.5–1.4)
EOSINOPHIL # BLD AUTO: 0.03 K/UL (ref 0–0.51)
EOSINOPHIL NFR BLD: 1.7 % (ref 0–6.9)
ERYTHROCYTE [DISTWIDTH] IN BLOOD BY AUTOMATED COUNT: 48.7 FL (ref 35.9–50)
GFR SERPLBLD CREATININE-BSD FMLA CKD-EPI: 39 ML/MIN/1.73 M 2
GLOBULIN SER CALC-MCNC: 2.4 G/DL (ref 1.9–3.5)
GLUCOSE SERPL-MCNC: 87 MG/DL (ref 65–99)
HCT VFR BLD AUTO: 20.4 % (ref 42–52)
HEMOCCULT STL QL: NEGATIVE
HGB BLD-MCNC: 6.8 G/DL (ref 14–18)
IMM GRANULOCYTES # BLD AUTO: 0.01 K/UL (ref 0–0.11)
IMM GRANULOCYTES NFR BLD AUTO: 0.6 % (ref 0–0.9)
LYMPHOCYTES # BLD AUTO: 0.06 K/UL (ref 1–4.8)
LYMPHOCYTES NFR BLD: 3.4 % (ref 22–41)
MAGNESIUM SERPL-MCNC: 1.7 MG/DL (ref 1.5–2.5)
MCH RBC QN AUTO: 31.2 PG (ref 27–33)
MCHC RBC AUTO-ENTMCNC: 33.3 G/DL (ref 33.7–35.3)
MCV RBC AUTO: 93.6 FL (ref 81.4–97.8)
MONOCYTES # BLD AUTO: 0.09 K/UL (ref 0–0.85)
MONOCYTES NFR BLD AUTO: 5.1 % (ref 0–13.4)
NEUTROPHILS # BLD AUTO: 1.58 K/UL (ref 1.82–7.42)
NEUTROPHILS NFR BLD: 89.2 % (ref 44–72)
NRBC # BLD AUTO: 0 K/UL
NRBC BLD-RTO: 0 /100 WBC
PHOSPHATE SERPL-MCNC: 2.8 MG/DL (ref 2.5–4.5)
PLATELET # BLD AUTO: 43 K/UL (ref 164–446)
PMV BLD AUTO: 13.1 FL (ref 9–12.9)
POTASSIUM SERPL-SCNC: 3.5 MMOL/L (ref 3.6–5.5)
PRODUCT TYPE UPROD: NORMAL
PROT SERPL-MCNC: 5.3 G/DL (ref 6–8.2)
RBC # BLD AUTO: 2.18 M/UL (ref 4.7–6.1)
RH BLD: NORMAL
SODIUM SERPL-SCNC: 138 MMOL/L (ref 135–145)
UNIT STATUS USTAT: NORMAL
WBC # BLD AUTO: 1.8 K/UL (ref 4.8–10.8)

## 2022-10-08 PROCEDURE — 86900 BLOOD TYPING SEROLOGIC ABO: CPT

## 2022-10-08 PROCEDURE — 700102 HCHG RX REV CODE 250 W/ 637 OVERRIDE(OP): Performed by: NURSE PRACTITIONER

## 2022-10-08 PROCEDURE — A9270 NON-COVERED ITEM OR SERVICE: HCPCS | Performed by: INTERNAL MEDICINE

## 2022-10-08 PROCEDURE — 85025 COMPLETE CBC W/AUTO DIFF WBC: CPT

## 2022-10-08 PROCEDURE — 84100 ASSAY OF PHOSPHORUS: CPT

## 2022-10-08 PROCEDURE — 700111 HCHG RX REV CODE 636 W/ 250 OVERRIDE (IP): Performed by: INTERNAL MEDICINE

## 2022-10-08 PROCEDURE — 30233N1 TRANSFUSION OF NONAUTOLOGOUS RED BLOOD CELLS INTO PERIPHERAL VEIN, PERCUTANEOUS APPROACH: ICD-10-PCS | Performed by: HOSPITALIST

## 2022-10-08 PROCEDURE — 700102 HCHG RX REV CODE 250 W/ 637 OVERRIDE(OP): Performed by: INTERNAL MEDICINE

## 2022-10-08 PROCEDURE — 99233 SBSQ HOSP IP/OBS HIGH 50: CPT | Performed by: INTERNAL MEDICINE

## 2022-10-08 PROCEDURE — 770020 HCHG ROOM/CARE - TELE (206)

## 2022-10-08 PROCEDURE — 86901 BLOOD TYPING SEROLOGIC RH(D): CPT

## 2022-10-08 PROCEDURE — A9270 NON-COVERED ITEM OR SERVICE: HCPCS | Performed by: NURSE PRACTITIONER

## 2022-10-08 PROCEDURE — 83735 ASSAY OF MAGNESIUM: CPT

## 2022-10-08 PROCEDURE — 80053 COMPREHEN METABOLIC PANEL: CPT

## 2022-10-08 PROCEDURE — 700102 HCHG RX REV CODE 250 W/ 637 OVERRIDE(OP): Performed by: HOSPITALIST

## 2022-10-08 PROCEDURE — 700105 HCHG RX REV CODE 258: Performed by: INTERNAL MEDICINE

## 2022-10-08 PROCEDURE — 36430 TRANSFUSION BLD/BLD COMPNT: CPT

## 2022-10-08 PROCEDURE — P9016 RBC LEUKOCYTES REDUCED: HCPCS

## 2022-10-08 PROCEDURE — 700111 HCHG RX REV CODE 636 W/ 250 OVERRIDE (IP): Performed by: HOSPITALIST

## 2022-10-08 PROCEDURE — 99232 SBSQ HOSP IP/OBS MODERATE 35: CPT | Performed by: HOSPITALIST

## 2022-10-08 PROCEDURE — A9270 NON-COVERED ITEM OR SERVICE: HCPCS | Performed by: HOSPITALIST

## 2022-10-08 PROCEDURE — 36415 COLL VENOUS BLD VENIPUNCTURE: CPT

## 2022-10-08 PROCEDURE — 86850 RBC ANTIBODY SCREEN: CPT

## 2022-10-08 PROCEDURE — 82272 OCCULT BLD FECES 1-3 TESTS: CPT

## 2022-10-08 PROCEDURE — 86923 COMPATIBILITY TEST ELECTRIC: CPT

## 2022-10-08 RX ORDER — FUROSEMIDE 10 MG/ML
40 INJECTION INTRAMUSCULAR; INTRAVENOUS ONCE
Status: COMPLETED | OUTPATIENT
Start: 2022-10-08 | End: 2022-10-08

## 2022-10-08 RX ORDER — MAGNESIUM SULFATE HEPTAHYDRATE 40 MG/ML
2 INJECTION, SOLUTION INTRAVENOUS ONCE
Status: COMPLETED | OUTPATIENT
Start: 2022-10-08 | End: 2022-10-08

## 2022-10-08 RX ORDER — ALPRAZOLAM 0.25 MG/1
0.25 TABLET ORAL EVERY 6 HOURS PRN
Status: DISCONTINUED | OUTPATIENT
Start: 2022-10-08 | End: 2022-10-12 | Stop reason: HOSPADM

## 2022-10-08 RX ORDER — LANOLIN ALCOHOL/MO/W.PET/CERES
400 CREAM (GRAM) TOPICAL DAILY
Status: DISCONTINUED | OUTPATIENT
Start: 2022-10-08 | End: 2022-10-12 | Stop reason: HOSPADM

## 2022-10-08 RX ORDER — POTASSIUM CHLORIDE 20 MEQ/1
40 TABLET, EXTENDED RELEASE ORAL 2 TIMES DAILY
Status: DISCONTINUED | OUTPATIENT
Start: 2022-10-08 | End: 2022-10-12 | Stop reason: HOSPADM

## 2022-10-08 RX ADMIN — ALPRAZOLAM 0.25 MG: 0.25 TABLET ORAL at 14:06

## 2022-10-08 RX ADMIN — POTASSIUM CHLORIDE 40 MEQ: 1500 TABLET, EXTENDED RELEASE ORAL at 17:20

## 2022-10-08 RX ADMIN — FUROSEMIDE 40 MG: 40 TABLET ORAL at 04:40

## 2022-10-08 RX ADMIN — POTASSIUM CHLORIDE 40 MEQ: 1500 TABLET, EXTENDED RELEASE ORAL at 04:39

## 2022-10-08 RX ADMIN — ACYCLOVIR 400 MG: 400 TABLET ORAL at 04:41

## 2022-10-08 RX ADMIN — HYDRALAZINE HYDROCHLORIDE 10 MG: 25 TABLET, FILM COATED ORAL at 04:39

## 2022-10-08 RX ADMIN — HYDRALAZINE HYDROCHLORIDE 10 MG: 25 TABLET, FILM COATED ORAL at 12:46

## 2022-10-08 RX ADMIN — HEPARIN SODIUM 5000 UNITS: 5000 INJECTION, SOLUTION INTRAVENOUS; SUBCUTANEOUS at 21:46

## 2022-10-08 RX ADMIN — PIPERACILLIN SODIUM AND TAZOBACTAM SODIUM 3.38 G: 3; .375 INJECTION, POWDER, FOR SOLUTION INTRAVENOUS at 21:46

## 2022-10-08 RX ADMIN — AMIODARONE HYDROCHLORIDE 400 MG: 200 TABLET ORAL at 17:16

## 2022-10-08 RX ADMIN — FUROSEMIDE 40 MG: 10 INJECTION, SOLUTION INTRAMUSCULAR; INTRAVENOUS at 12:46

## 2022-10-08 RX ADMIN — MAGNESIUM OXIDE TAB 400 MG (241.3 MG ELEMENTAL MG) 400 MG: 400 (241.3 MG) TAB at 17:20

## 2022-10-08 RX ADMIN — AMIODARONE HYDROCHLORIDE 400 MG: 200 TABLET ORAL at 04:40

## 2022-10-08 RX ADMIN — PIPERACILLIN SODIUM AND TAZOBACTAM SODIUM 3.38 G: 3; .375 INJECTION, POWDER, FOR SOLUTION INTRAVENOUS at 04:37

## 2022-10-08 RX ADMIN — PIPERACILLIN SODIUM AND TAZOBACTAM SODIUM 3.38 G: 3; .375 INJECTION, POWDER, FOR SOLUTION INTRAVENOUS at 12:47

## 2022-10-08 RX ADMIN — MAGNESIUM SULFATE HEPTAHYDRATE 2 G: 40 INJECTION, SOLUTION INTRAVENOUS at 17:27

## 2022-10-08 RX ADMIN — HYDRALAZINE HYDROCHLORIDE 10 MG: 25 TABLET, FILM COATED ORAL at 21:47

## 2022-10-08 ASSESSMENT — PAIN DESCRIPTION - PAIN TYPE
TYPE: ACUTE PAIN

## 2022-10-08 ASSESSMENT — ENCOUNTER SYMPTOMS
ABDOMINAL PAIN: 0
PSYCHIATRIC NEGATIVE: 1
DIARRHEA: 0
VOMITING: 0
CHILLS: 0
NAUSEA: 0
FEVER: 0
LOSS OF CONSCIOUSNESS: 0
BLURRED VISION: 0
COUGH: 0
COUGH: 1
SHORTNESS OF BREATH: 1
DOUBLE VISION: 0
NERVOUS/ANXIOUS: 1
BACK PAIN: 0
ORTHOPNEA: 0
PALPITATIONS: 1
SPUTUM PRODUCTION: 1
HEADACHES: 0
DIZZINESS: 0
SHORTNESS OF BREATH: 0
CARDIOVASCULAR NEGATIVE: 1

## 2022-10-08 ASSESSMENT — FIBROSIS 4 INDEX: FIB4 SCORE: 5.99

## 2022-10-08 NOTE — PROGRESS NOTES
"Pulmonary Progress Note    Date of admission  9/21/2022    Chief Complaint  72 y.o. male admitted 9/21/2022 with [facility]    Hospital Course    \" 72-year-old male with a history of recent BRADLEY and COVID-19 infection July, again PCR positive in September, hypertension, aortic stenosis and follicular lymphoma on chemo/immunotherapy with Bendamustine and Obinutuzumab. He gets his oncologic care in [place] at [facility].  He was admitted on 9/21 with an NSTEMI and worsening shortness of breath with an echocardiogram showing ejection fraction of 20% with inferolateral hypokinesis.  He was admitted to the ICU, a PA catheter was placed and he was started on diuretics underwent evaluation for possible TAVR.  Also started on Decadron, doxycycline, and remdesivir for persistent vs reinfection with COVID. On Acyclovir for ppx. In the ICU was on high flow nasal cannula.      No reference to lung parenchymal disease on CT abd/pelvis at [facility] in 4/2022    CXR due to persistent hypoxia was performed 10/1 showed an incidental right apical pneumothorax with right-sided subcutaneous air extending into the neck as well as patchy bilateral alveolar opacities    CT of the chest 10/1 shows extensive pneumomediastinum with small right apical pneumothorax, persistent unchanged patchy bilateral groundglass opacities\"   -From Dr Hutchins's note     10/3/22 -patient continues to be on high flow nasal cannula, up to 40 L at 80%  10/4 -patient reports overall he is improving, he is on high flow nasal cannula 30 L at 50%.  Overall, patient is feeling better.  He states that he has less short of breath and his nosebleed has improved somewhat.  He states that he was using his incentive spirometer, but has not been using it very much in the last day or so.  He endorses that he has not really gotten out of bed yet.  10/6 -patient had 2 episodes of SVT this morning with hypotension.  He received adenosine twice.  On my evaluation, " he states that he is comfortable.  His FiO2 was turned up during his event, however, his saturations are 100%.  He states that his breathing does not feel any worse today.  10/7 -patient reports that he is feeling better today.  He was started on amiodarone yesterday.  He states that he has not had any additional nosebleeding.  He feels that he is getting better slowly.  10/8 - he reports that he is feeling much better today, did not sleep well. Breathing feels much improved     Interval Problem Update  Reviewed last 24 hour events:  Simple mask     Review of Systems  Review of Systems   Constitutional:  Positive for malaise/fatigue.   HENT:  Negative for congestion and nosebleeds.    Respiratory:  Negative for cough and shortness of breath.    Cardiovascular: Negative.    Psychiatric/Behavioral: Negative.        Vital Signs for last 24 hours   Temp:  [36.4 °C (97.5 °F)-37.1 °C (98.7 °F)] 36.6 °C (97.9 °F)  Pulse:  [84-97] 86  Resp:  [16-18] 18  BP: ()/(46-78) 110/55  SpO2:  [90 %-98 %] 98 %    Physical Exam   Physical Exam  Vitals and nursing note reviewed.   Constitutional:       Appearance: Normal appearance. He is well-developed.   HENT:      Nose: Nose normal.   Eyes:      Conjunctiva/sclera: Conjunctivae normal.   Neck:      Thyroid: No thyromegaly.      Vascular: No JVD.      Trachea: No tracheal deviation.   Cardiovascular:      Rate and Rhythm: Normal rate and regular rhythm.   Pulmonary:      Effort: Pulmonary effort is normal. No respiratory distress.      Breath sounds: Rhonchi present. No wheezing or rales.   Abdominal:      Palpations: Abdomen is soft.   Skin:     General: Skin is warm.   Neurological:      Mental Status: He is alert and oriented to person, place, and time.      Motor: No abnormal muscle tone.   Psychiatric:         Behavior: Behavior normal.         Thought Content: Thought content normal.         Judgment: Judgment normal.     Medications  Current Facility-Administered  Medications   Medication Dose Route Frequency Provider Last Rate Last Admin    furosemide (LASIX) injection 40 mg  40 mg Intravenous Once Altaf Perdomo D.O.        amiodarone (Cordarone) tablet 400 mg  400 mg Oral TWICE DAILY LALO KellerRBLADIMIR   400 mg at 10/08/22 0440    [START ON 10/22/2022] amiodarone (Cordarone) tablet 200 mg  200 mg Oral DAILY MANISH Keller        heparin injection 5,000 Units  5,000 Units Subcutaneous Q8HRS Marita Yee D.O.   5,000 Units at 10/07/22 2104    potassium chloride SA (Kdur) tablet 40 mEq  40 mEq Oral DAILY Altaf Perdomo D.O.   40 mEq at 10/08/22 0439    influenza Vac High-Dose Quad (Fluzone) injection 0.7 mL  0.7 mL Intramuscular Once PRN Altaf Perdomo D.O.        piperacillin-tazobactam (Zosyn) 3.375 g in  mL IVPB  3.375 g Intravenous Q8HRS Destinee Simeon M.D.   Stopped at 10/08/22 0837    sodium chloride (OCEAN) 0.65 % nasal spray 2 Spray  2 Spray Nasal Q2HRS PRN Marita Yee D.O.   2 Gould at 10/04/22 0540    menthol ([place]alls) lozenge 1 Lozenge  1 Lozenge Oral Q2HRS PRN Abisai Valdez M.D.   1 Lozenge at 10/06/22 2133    guaiFENesin dextromethorphan (ROBITUSSIN DM) 100-10 MG/5ML syrup 5 mL  5 mL Oral Q6HRS PRN Abisai Valdez M.D.   5 mL at 10/07/22 1625    lidocaine 2 % nebulizer solution 5 mL  5 mL Nebulization Q6HRS PRN (RT) Abisai Valdez M.D.        hydrALAZINE (APRESOLINE) tablet 10 mg  10 mg Oral Q8HRS Marcos Ball M.D.   10 mg at 10/08/22 0439    acyclovir (Zovirax) tablet 400 mg  400 mg Oral DAILY Abisai Valdez M.D.   400 mg at 10/08/22 0441    senna-docusate (PERICOLACE or SENOKOT S) 8.6-50 MG per tablet 2 Tablet  2 Tablet Oral BID Sally Mondragon M.D.   2 Tablet at 10/04/22 0540    And    polyethylene glycol/lytes (MIRALAX) PACKET 1 Packet  1 Packet Oral QDAY PRN Sally Mondragon M.D.        And    magnesium hydroxide (MILK OF MAGNESIA)  suspension 30 mL  30 mL Oral QDAY PRN Sally Mondragon M.D.        And    bisacodyl (DULCOLAX) suppository 10 mg  10 mg Rectal QDAY PRN Sally Mondragon M.D.         Fluids    Intake/Output Summary (Last 24 hours) at 10/8/2022 1204  Last data filed at 10/8/2022 0900  Gross per 24 hour   Intake 240 ml   Output 1020 ml   Net -780 ml       Laboratory          Recent Labs     10/06/22  0350 10/06/22  0812 10/06/22  1048 10/07/22  0415 10/07/22  1008 10/08/22  0325   SODIUM 140   < > 143 139 139 138   POTASSIUM 4.1   < > 3.6 3.7 4.1 3.5*   CHLORIDE 109   < > 116* 108 106 107   CO2 19*   < > 15* 19* 19* 20   BUN 46*   < > 38* 39* 35* 31*   CREATININE 1.77*   < > 1.56* 1.91* 1.83* 1.80*   MAGNESIUM 2.5  --  1.9 1.9  --  1.7   PHOSPHORUS 2.7  --   --  2.4*  --  2.8   CALCIUM 8.6   < > 7.0* 8.1* 8.1* 8.2*    < > = values in this interval not displayed.       Recent Labs     10/06/22  0350 10/06/22  0812 10/07/22  0415 10/07/22  1008 10/08/22  0325   ALTSGPT 31  --  24  --  26   ASTSGOT 18  --  16  --  18   ALKPHOSPHAT 243*  --  180*  --  172*   TBILIRUBIN 0.7  --  0.4  --  0.4   GLUCOSE 105*   < > 94 140* 87    < > = values in this interval not displayed.       Recent Labs     10/06/22  0350 10/07/22  0415 10/08/22  0325   WBC 3.9* 1.7* 1.8*   NEUTSPOLYS 94.50* 88.00* 89.20*   LYMPHOCYTES 1.80* 4.20* 3.40*   MONOCYTES 2.60 4.20 5.10   EOSINOPHILS 0.80 2.40 1.70   BASOPHILS 0.00 0.00 0.00   ASTSGOT 18 16 18   ALTSGPT 31 24 26   ALKPHOSPHAT 243* 180* 172*   TBILIRUBIN 0.7 0.4 0.4       Recent Labs     10/06/22  0350 10/07/22  0415 10/08/22  0325   RBC 2.50* 2.25* 2.18*   HEMOGLOBIN 7.8* 7.0* 6.8*   HEMATOCRIT 23.2* 21.0* 20.4*   PLATELETCT 55* 56* 43*       Imaging  X-Ray:  No film today    Assessment/Plan    * Acute respiratory failure with hypoxia (HCC)  Assessment & Plan  Likely due to COVID and superimposed pneumonia -improving, now on 5 lpm face mask     CT showing persistent bilateral patchy ground glass  opacities with basilar consolidations w/ bronchiectasis  Patient has systolic heart failure with an EF of 20%  Pneumomediastinum unlikely contributing to hypoxemia    Overall, patient is improving, he feels less short of breath and his oxygen requirements have come down, favor the diagnosis of bacterial superinfection    Continue zosyn, ID following, 7 days   Defer lasix management to cards and primary   Continue saline and Afrin nasal spray for nosebleed  Continue with incentive spirometry and chest physiotherapy  Okay to continue with cough suppression at this time as patient has pneumomediastinum    Hemoptysis  Assessment & Plan  Resolved   Due to HFNC, now on simple mask   Heparin and aspirin on hold  DVT prophylaxis restarted   Continue saline nasal spray and Afrin      Secondary spontaneous pneumothorax  Assessment & Plan  Pneumothorax and subcutaneous emphysema  Suspect due to fits of coughing  Stable on serial imaging    Repeat CT prior to DC to assess for resolution          VTE: Lovenox    We will sign off. Please do not hesitate to contact us if we can be of any further assistance. I am most easily reached via [employer] secure messaging application.    Marita Yee, DO   Pulmonary and Critical Care

## 2022-10-08 NOTE — CARE PLAN
The patient is Watcher - Medium risk of patient condition declining or worsening    Shift Goals  Clinical Goals: blood tx  Patient Goals: rest  Family Goals: aldo    Progress made toward(s) clinical / shift goals:    Problem: Knowledge Deficit - Standard  Goal: Patient and family/care givers will demonstrate understanding of plan of care, disease process/condition, diagnostic tests and medications  Outcome: Progressing     Problem: Hemodynamics  Goal: Patient's hemodynamics, fluid balance and neurologic status will be stable or improve  Outcome: Progressing     Problem: Fluid Volume  Goal: Fluid volume balance will be maintained  Outcome: Progressing       Patient is not progressing towards the following goals:

## 2022-10-08 NOTE — PROGRESS NOTES
NOC HOSPITALIST CROSS COVER    Notified by RN regarding hemoglobin of 6.8.  The patient has an ejection fraction of 20% and is currently fluid overloaded.  Given the fact that he was just weaned off of high flow nasal cannula, there is some concern that giving blood products will fluid overload the patient more.  Spoke with collaborating MD, Dr. Sanchez regarding the situation.  Plan to hold off on transfusion unless patient becomes symptomatic with shortness of breath, chest pain, dizziness, or becomes hemodynamically unstable.  Bedside RN updated on the plan of care and agreeable.      Vitals:    10/08/22 0400   BP: 115/56   Pulse: 94   Resp: 16   Temp: 36.9 °C (98.4 °F)   SpO2: 96%                -----------------------------------------------------------------------------------------------------------    Electronically signed by:  KRYSTAL Neff [facility]  Hospitalist Services

## 2022-10-08 NOTE — PROGRESS NOTES
Monitor summary:     SR 80-96 with (R) PVCs    CA .18, QRS .10, QT .35 per strip from monitor room.

## 2022-10-08 NOTE — PROGRESS NOTES
Assumed care of patient at bedside report from day RN. Updated on POC. Patient currently A & O x 4; on 4 L O2 via oxy mask; up max assist with complaints of acute pain. Assessment completed.  Call light within reach. Whiteboard updated. Fall precautions in place. Bed locked and in lowest position. All questions answered. No other needs indicated at this time.

## 2022-10-08 NOTE — CARE PLAN
The patient is Stable - Low risk of patient condition declining or worsening    Shift Goals  Clinical Goals: maintain O2 sat greater then 89%  Patient Goals: rest  Family Goals: N/A    Progress made toward(s) clinical / shift goals:    Problem: Knowledge Deficit - Standard  Goal: Patient and family/care givers will demonstrate understanding of plan of care, disease process/condition, diagnostic tests and medications  Outcome: Progressing     Problem: Infection - Standard  Goal: Patient will remain free from infection  Outcome: Progressing       Patient is not progressing towards the following goals:      Problem: Hemodynamics  Goal: Patient's hemodynamics, fluid balance and neurologic status will be stable or improve  Outcome: Not Progressing    PT HGB @ 7.0, current labs pending, pt also having profuse watery diarrhea. Will update care team as needed and transfuse blood if ordered. Pt currently on 4L O2 via oxy mask and maintaining sats in low 90s. Will titrate o2 as needed.

## 2022-10-08 NOTE — CARE PLAN
The patient is Watcher - Medium risk of patient condition declining or worsening    Shift Goals  Clinical Goals: Pt will maintain NSR. Pt will maintain o2 SAT >89% on current HF settings of 60LPM at 100%  Patient Goals: Pt will report improvement in cough  Family Goals: N/A    Progress made toward(s) clinical / shift goals:    Problem: Knowledge Deficit - Standard  Goal: Patient and family/care givers will demonstrate understanding of plan of care, disease process/condition, diagnostic tests and medications  Outcome: Progressing     Problem: Hemodynamics  Goal: Patient's hemodynamics, fluid balance and neurologic status will be stable or improve  Outcome: Progressing       Patient is not progressing towards the following goals:

## 2022-10-08 NOTE — PROGRESS NOTES
Hospital Medicine Daily Progress Note    Date of Service  10/8/2022    Chief Complaint  Ceferino Fitch is a 72 y.o. male admitted 9/21/2022 with SOB    Hospital Course  73yo PMHx HTN, NHL on immunotherapy ([place], obinutuzumab), gout, HLD, CKD, HTN.  + COVID 9/13.   TTE LVEF 20% inf/inf/lat hypokinesisi, severe AS.  Has been treated with decadron and Remdesivir on this admission    Interval Problem Update  Again feeling better today but anxious about getting up and working with therapy and staff    In afternoon pt got up to chair and went into SVT 160s.  SBP 80s.  Converted on his own after a few minutes    DW wife at bedside    On mask at 5 L  SBP   AFebrile  UOP 1700ml/24hrs on 40mg po lasix, neg 1050ml/24hrs    I have discussed this patient's plan of care and discharge plan at IDT rounds today with Case Management, Nursing, Nursing leadership, and other members of the IDT team.    Consultants/Specialty  cardiology, infectious disease, and pulmonary    Code Status  Full Code    Disposition  Patient is not medically cleared for discharge.   Anticipate discharge to to home with close outpatient follow-up.  I have placed the appropriate orders for post-discharge needs.    Review of Systems  Review of Systems   Constitutional:  Negative for chills and fever.   Eyes:  Negative for blurred vision and double vision.   Respiratory:  Positive for cough, sputum production and shortness of breath.    Cardiovascular:  Positive for palpitations. Negative for chest pain, orthopnea and leg swelling.   Gastrointestinal:  Negative for abdominal pain, diarrhea, nausea and vomiting.   Genitourinary:  Negative for dysuria and urgency.   Musculoskeletal:  Negative for back pain.   Skin:  Negative for rash.   Neurological:  Negative for dizziness, loss of consciousness and headaches.   Psychiatric/Behavioral:  The patient is nervous/anxious.       Physical Exam  Temp:  [36.2 °C (97.2 °F)-37.1 °C (98.7 °F)] 36.9 °C (98.4  °F)  Pulse:  [74-97] 94  Resp:  [16-17] 16  BP: ()/(44-56) 115/56  SpO2:  [90 %-96 %] 96 %    Physical Exam  Constitutional:       General: He is not in acute distress.     Appearance: Normal appearance. He is well-developed. He is not diaphoretic.   HENT:      Head: Normocephalic and atraumatic.   Eyes:      Conjunctiva/sclera: Conjunctivae normal.   Neck:      Vascular: No JVD.   Cardiovascular:      Rate and Rhythm: Regular rhythm. Tachycardia present.      Heart sounds: No murmur heard.    No gallop.   Pulmonary:      Effort: Pulmonary effort is normal. No respiratory distress.      Breath sounds: No stridor. Rhonchi present. No wheezing or rales.   Abdominal:      Palpations: Abdomen is soft.      Tenderness: There is no abdominal tenderness. There is no guarding or rebound.   Musculoskeletal:         General: No tenderness.      Right lower leg: No edema.      Left lower leg: No edema.   Skin:     General: Skin is warm and dry.      Capillary Refill: Capillary refill takes less than 2 seconds.      Findings: No rash.   Neurological:      Mental Status: He is alert and oriented to person, place, and time.   Psychiatric:         Mood and Affect: Mood normal.         Behavior: Behavior normal.         Thought Content: Thought content normal.       Fluids    Intake/Output Summary (Last 24 hours) at 10/8/2022 0635  Last data filed at 10/8/2022 0300  Gross per 24 hour   Intake 360 ml   Output 1270 ml   Net -910 ml         Laboratory  Recent Labs     10/06/22  0350 10/07/22  0415 10/08/22  0325   WBC 3.9* 1.7* 1.8*   RBC 2.50* 2.25* 2.18*   HEMOGLOBIN 7.8* 7.0* 6.8*   HEMATOCRIT 23.2* 21.0* 20.4*   MCV 92.8 93.3 93.6   MCH 31.2 31.1 31.2   MCHC 33.6* 33.3* 33.3*   RDW 48.6 49.0 48.7   PLATELETCT 55* 56* 43*   MPV 12.2 13.5* 13.1*       Recent Labs     10/07/22  0415 10/07/22  1008 10/08/22  0325   SODIUM 139 139 138   POTASSIUM 3.7 4.1 3.5*   CHLORIDE 108 106 107   CO2 19* 19* 20   GLUCOSE 94 140* 87   BUN 39*  35* 31*   CREATININE 1.91* 1.83* 1.80*   CALCIUM 8.1* 8.1* 8.2*                     Imaging  DX-CHEST-PORTABLE (1 VIEW)   Final Result         1.  Pulmonary edema and/or infiltrates are identified, which are stable since the prior exam.   2.  Soft tissue gas in the chest wall and neck, somewhat increased since prior study.   3.  Atherosclerosis      DX-CHEST-PORTABLE (1 VIEW)   Final Result         1.  Pulmonary edema and/or infiltrates are identified, which are stable since the prior exam.   2.  Soft tissue gas in the chest wall and neck, increased on the left since prior study..   3.  Atherosclerosis      DX-CHEST-PORTABLE (1 VIEW)   Final Result         1.  Pulmonary edema and/or infiltrates are identified, which are stable since the prior exam.   2.  Right chest wall and neck soft tissue gas.   3.  Atherosclerosis      DX-CHEST-PORTABLE (1 VIEW)   Final Result      1.  No visible pneumothorax. This could be obscured by the patient's pneumomediastinum and soft tissue gas.   2.  Unchanged BILATERAL pulmonary opacities      DX-CHEST-PORTABLE (1 VIEW)   Final Result      1.  No visible pneumothorax. This could be obscured by the patient's pneumomediastinum and soft tissue gas.   2.  Unchanged BILATERAL pulmonary opacities      CT-CHEST (THORAX) W/O   Final Result      1.  Extensive pneumomediastinum with air within the soft tissues of the right neck and chest.   2.  Small right pneumothorax.   3.  Patchy extensive bilateral groundglass opacities with more dependent consolidative opacities are nonspecific, Covid Pneumonia versus other infectious/inflammatory etiology   4.  Sclerotic lesions in the right T11 and T12 levels in keeping with sclerotic osseous metastases   These findings were discussed with PROSPER BOWMAN III on 10/1/2022 4:49 PM.      Fleischner Society pulmonary nodule recommendations:   Not Applicable         DX-CHEST-PORTABLE (1 VIEW)   Final Result      1.  There is a new small right apical  pneumothorax with new right-sided subcutaneous air extending into the neck. Question of a small amount of mediastinal air.   2.  Interval increase in patchy lower lobe opacities which could be atelectasis or pneumonitis or edema.      3.  Findings were discussed with Eleno, the patient's nurse at 2:58 PM on 10/1/2022 on 10/1/2022 2:58 PM. He states he will give this message to the physician taking care of the patient.      DX-CHEST-PORTABLE (1 VIEW)   Final Result      1.  Right-sided [place]-Frannie catheter tip projects in the distal right main pulmonary artery region. No pneumothorax.   2.  Mild interval worsening in hazy bilateral pulmonary opacities.         DX-CHEST-PORTABLE (1 VIEW)   Final Result      1.  Right-sided [place]-Frannie catheter tip projects in the distal right main pulmonary artery region. No pneumothorax.   2.  Probable slight improvement in hazy bilateral pulmonary opacities.      DX-CHEST-PORTABLE (1 VIEW)   Final Result      1.  Right-sided PICC line unchanged in position.      2.  Increased interstitial markings throughout the lung fields especially in the perihilar regions consistent with pulmonary edema and/or pneumonitis.      CT-TAVR CHEST-ABD-PELVIS W/WO POST PROCESS   Final Result      1.  Limited by lack of intravenous contrast      2.  Aortic valve Agatston score 4565 and the annulus measures approximately 450 sq mm      3.  Coronary arteries originate over a centimeter above the annulus      4.  Severe atherosclerotic change with marked narrowing of the iliofemoral runoff, right worse than left      5.  Multifocal groundglass greater than bronchial thickening is compatible with known Covid pneumonia. There is also evidence of lower lung zone scarring, possible interstitial lung disease and small pleural fluid      6.  Colonic diverticulosis, emphysema, left heart enlargement      DX-CHEST-PORTABLE (1 VIEW)   Final Result      Unchanged BILATERAL pneumonia      EC-ECHOCARDIOGRAM COMPLETE W/O  CONT   Final Result      DX-CHEST-LIMITED (1 VIEW)   Final Result      1.  Multifocal pneumonia. Pulmonary edema could have a similar appearance.      US-RUQ   Final Result      Unremarkable RIGHT upper quadrant ultrasound             Assessment/Plan  * Acute respiratory failure with hypoxia (HCC)  Assessment & Plan  Respiratory failure in setting of covid -19 vs ? HF   Incentive spirometry  Currently he is on high flow oxygen to maintain oxygen saturation.   Pulm following  Has been treated for COVID with decadron and Remdesivir  ?secondary infectious process: ID consulted and following  ?steroids: defer to pulm  Currently on Pip/Tazo  Acyclovir is for prophylaxis  Has been on IV lasix with significant neg fluid balance.  Now on maintenance po lasix  Now off HFNC    SVT (supraventricular tachycardia) (HCC)  Assessment & Plan  Requiring cardioversion with adenosine 6mg   BPs are soft precluding BB or CCB  Concerned with an LVEF of 20% and severe AS he will not tolerate this dysrythmia well  Cards consulted  Having recurrent episodes with exertion  Cont po amio load    Hypomagnesemia  Assessment & Plan  1.7 today: giving 2g IV MgSO4  Start po supplementation  Cont to follow    Secondary spontaneous pneumothorax  Assessment & Plan  He found to have a pneumothorax on chest x-ray October 02, 2022.  Pulmonary and infectious disease consulted.  Continue to monitor closely.  Daily chest x-ray.    Heart failure with reduced ejection fraction (HCC)  Assessment & Plan  Echo showed severely reduced ejection fraction   Cardiology is following him.  Monitor volume status; on IV lasix  Need to be cautious with diuresis given AS  Afterload reduction on hydralazine  Initiate GDMT as pressures allow  Will reconsult Cards for Dobutamine stress test once off HFNC    Advance care planning  Assessment & Plan  Discussed by prior treatment team.  had a prolonged discussion with the patient regarding goals of care, diagnoses, prognosis,  and CODE STATUS. We discussed his   prognosis and comorbidities. I spent  16  minutes on advanced care planning in addition to the time spent managing the other medical problems.    He requested to remain full code      Elevated liver enzymes  Assessment & Plan  Hepatitis panel came back negative.  right upper quadrant ultrasound did not show any acute abnormalities.  Could be secondary to remdesivir or congestive etiology  AST/ALT now in normal range    Elevated brain natriuretic peptide (BNP) level  Assessment & Plan  Likely in setting of COVID-19 infection  Euvolemic on exam  Echo showed reduced ejection fraction.    CKD (chronic kidney disease)  Assessment & Plan  Renal function has been fluctuating.  Increased BUN and creatinine this morning.  Continue to monitor  Renally dose medications as appropriate    Pancytopenia (HCC)  Assessment & Plan  In setting of immunotherapy  White blood cell count has been fluctuating.  Continue to monitor    Hypokalemia  Assessment & Plan  Follow daily  Replacing Mg      Low bicarbonate  Assessment & Plan  In setting of CKD  Continue to monitor.    COVID  Assessment & Plan    Activate sepsis protocol  COVID Isolation  He was placed on oxygen nasal cannula but he developed hypoxia again and he was placed on high flow nasal cannula.  Completed Decadron last dose October 1, 2022  He completed course of remdesivir.  Repeat COVID testing ordered by pulmonologist came back positive.  Monitor oxygen saturation closely      NSTEMI (non-ST elevated myocardial infarction) (HCC)- (present on admission)  Assessment & Plan    Echo showing ef of 20% probable severer Aortic stenosis  Cardiology evaluated him and recommended to reconsult for possible stress echo when he is more stable.  No symptoms of acute chest pain.      Essential hypertension, benign- (present on admission)  Assessment & Plan  Mild hypotension.  Continue to monitor.    Follicular lymphoma grade I (HCC)- (present on  admission)  Assessment & Plan  The patient has a history of follicular lymphoma (on immunotherapy).    last immunotherapy infusion to treat his cancer in [place] on 09/09/2022  Follow-up with outpatient       VTE prophylaxis: enoxaparin ppx    I have performed a physical exam and reviewed and updated ROS and Plan today (10/8/2022). In review of yesterday's note (10/7/2022), there are no changes except as documented above.

## 2022-10-08 NOTE — PROGRESS NOTES
Updated on APRN hospitalist Cirilo with lab results of hemoglobin @ 6.8. Her plan is to hold off on blood transfusion at this time due to pts current state of fluid overload and EF of 20%. Cirilo also aware of pt's critical  WBC count @ 1.8. Will update her with any changes in pts status.

## 2022-10-08 NOTE — PROGRESS NOTES
Pt ambulated to chair and was resting when monitors called pt was sustaining 160s SVT, like he had several days before. BP was 85/50. MD called to bedside. Pt transported back to bed and was back in NSR, 100s, /65. No interventions needed at the time.

## 2022-10-09 LAB
ALBUMIN SERPL BCP-MCNC: 2.7 G/DL (ref 3.2–4.9)
ALBUMIN/GLOB SERPL: 1.1 G/DL
ALP SERPL-CCNC: 142 U/L (ref 30–99)
ALT SERPL-CCNC: 17 U/L (ref 2–50)
ANION GAP SERPL CALC-SCNC: 13 MMOL/L (ref 7–16)
AST SERPL-CCNC: 14 U/L (ref 12–45)
BASOPHILS # BLD AUTO: 0 % (ref 0–1.8)
BASOPHILS # BLD: 0 K/UL (ref 0–0.12)
BILIRUB SERPL-MCNC: 0.5 MG/DL (ref 0.1–1.5)
BUN SERPL-MCNC: 26 MG/DL (ref 8–22)
CALCIUM SERPL-MCNC: 8.1 MG/DL (ref 8.5–10.5)
CHLORIDE SERPL-SCNC: 105 MMOL/L (ref 96–112)
CO2 SERPL-SCNC: 19 MMOL/L (ref 20–33)
CREAT SERPL-MCNC: 1.76 MG/DL (ref 0.5–1.4)
EOSINOPHIL # BLD AUTO: 0.04 K/UL (ref 0–0.51)
EOSINOPHIL NFR BLD: 1.9 % (ref 0–6.9)
ERYTHROCYTE [DISTWIDTH] IN BLOOD BY AUTOMATED COUNT: 47.1 FL (ref 35.9–50)
GFR SERPLBLD CREATININE-BSD FMLA CKD-EPI: 40 ML/MIN/1.73 M 2
GLOBULIN SER CALC-MCNC: 2.5 G/DL (ref 1.9–3.5)
GLUCOSE SERPL-MCNC: 77 MG/DL (ref 65–99)
HCT VFR BLD AUTO: 24 % (ref 42–52)
HGB BLD-MCNC: 8.3 G/DL (ref 14–18)
IMM GRANULOCYTES # BLD AUTO: 0.02 K/UL (ref 0–0.11)
IMM GRANULOCYTES NFR BLD AUTO: 0.9 % (ref 0–0.9)
LYMPHOCYTES # BLD AUTO: 0.11 K/UL (ref 1–4.8)
LYMPHOCYTES NFR BLD: 5.1 % (ref 22–41)
MAGNESIUM SERPL-MCNC: 2.1 MG/DL (ref 1.5–2.5)
MCH RBC QN AUTO: 31 PG (ref 27–33)
MCHC RBC AUTO-ENTMCNC: 34.6 G/DL (ref 33.7–35.3)
MCV RBC AUTO: 89.6 FL (ref 81.4–97.8)
MONOCYTES # BLD AUTO: 0.12 K/UL (ref 0–0.85)
MONOCYTES NFR BLD AUTO: 5.6 % (ref 0–13.4)
NEUTROPHILS # BLD AUTO: 1.87 K/UL (ref 1.82–7.42)
NEUTROPHILS NFR BLD: 86.5 % (ref 44–72)
NRBC # BLD AUTO: 0 K/UL
NRBC BLD-RTO: 0 /100 WBC
PHOSPHATE SERPL-MCNC: 2.7 MG/DL (ref 2.5–4.5)
PLATELET # BLD AUTO: 45 K/UL (ref 164–446)
PMV BLD AUTO: 10.4 FL (ref 9–12.9)
POTASSIUM SERPL-SCNC: 3.5 MMOL/L (ref 3.6–5.5)
PROT SERPL-MCNC: 5.2 G/DL (ref 6–8.2)
RBC # BLD AUTO: 2.68 M/UL (ref 4.7–6.1)
SODIUM SERPL-SCNC: 137 MMOL/L (ref 135–145)
WBC # BLD AUTO: 2.2 K/UL (ref 4.8–10.8)

## 2022-10-09 PROCEDURE — A9270 NON-COVERED ITEM OR SERVICE: HCPCS | Performed by: INTERNAL MEDICINE

## 2022-10-09 PROCEDURE — A9270 NON-COVERED ITEM OR SERVICE: HCPCS | Performed by: HOSPITALIST

## 2022-10-09 PROCEDURE — 700111 HCHG RX REV CODE 636 W/ 250 OVERRIDE (IP): Performed by: HOSPITALIST

## 2022-10-09 PROCEDURE — 85025 COMPLETE CBC W/AUTO DIFF WBC: CPT

## 2022-10-09 PROCEDURE — 700105 HCHG RX REV CODE 258: Performed by: INTERNAL MEDICINE

## 2022-10-09 PROCEDURE — 80053 COMPREHEN METABOLIC PANEL: CPT

## 2022-10-09 PROCEDURE — 700111 HCHG RX REV CODE 636 W/ 250 OVERRIDE (IP): Performed by: INTERNAL MEDICINE

## 2022-10-09 PROCEDURE — A9270 NON-COVERED ITEM OR SERVICE: HCPCS | Performed by: NURSE PRACTITIONER

## 2022-10-09 PROCEDURE — 83735 ASSAY OF MAGNESIUM: CPT

## 2022-10-09 PROCEDURE — 700102 HCHG RX REV CODE 250 W/ 637 OVERRIDE(OP): Performed by: INTERNAL MEDICINE

## 2022-10-09 PROCEDURE — 700102 HCHG RX REV CODE 250 W/ 637 OVERRIDE(OP): Performed by: HOSPITALIST

## 2022-10-09 PROCEDURE — 700102 HCHG RX REV CODE 250 W/ 637 OVERRIDE(OP): Performed by: NURSE PRACTITIONER

## 2022-10-09 PROCEDURE — 99232 SBSQ HOSP IP/OBS MODERATE 35: CPT | Performed by: HOSPITALIST

## 2022-10-09 PROCEDURE — 84100 ASSAY OF PHOSPHORUS: CPT

## 2022-10-09 PROCEDURE — 770020 HCHG ROOM/CARE - TELE (206)

## 2022-10-09 RX ORDER — DIGOXIN 125 MCG
62.5 TABLET ORAL DAILY
Status: DISCONTINUED | OUTPATIENT
Start: 2022-10-10 | End: 2022-10-11

## 2022-10-09 RX ORDER — OXYMETAZOLINE HYDROCHLORIDE 0.05 G/100ML
2 SPRAY NASAL 2 TIMES DAILY
Status: DISCONTINUED | OUTPATIENT
Start: 2022-10-09 | End: 2022-10-12 | Stop reason: HOSPADM

## 2022-10-09 RX ORDER — DIGOXIN 0.25 MG/ML
500 INJECTION INTRAMUSCULAR; INTRAVENOUS ONCE
Status: COMPLETED | OUTPATIENT
Start: 2022-10-09 | End: 2022-10-09

## 2022-10-09 RX ADMIN — PIPERACILLIN SODIUM AND TAZOBACTAM SODIUM 3.38 G: 3; .375 INJECTION, POWDER, FOR SOLUTION INTRAVENOUS at 04:14

## 2022-10-09 RX ADMIN — HEPARIN SODIUM 5000 UNITS: 5000 INJECTION, SOLUTION INTRAVENOUS; SUBCUTANEOUS at 04:14

## 2022-10-09 RX ADMIN — POTASSIUM CHLORIDE 40 MEQ: 1500 TABLET, EXTENDED RELEASE ORAL at 04:15

## 2022-10-09 RX ADMIN — HYDRALAZINE HYDROCHLORIDE 10 MG: 25 TABLET, FILM COATED ORAL at 04:16

## 2022-10-09 RX ADMIN — HYDRALAZINE HYDROCHLORIDE 10 MG: 25 TABLET, FILM COATED ORAL at 13:59

## 2022-10-09 RX ADMIN — POTASSIUM CHLORIDE 40 MEQ: 1500 TABLET, EXTENDED RELEASE ORAL at 18:12

## 2022-10-09 RX ADMIN — Medication 1 LOZENGE: at 13:59

## 2022-10-09 RX ADMIN — HYDRALAZINE HYDROCHLORIDE 10 MG: 25 TABLET, FILM COATED ORAL at 21:37

## 2022-10-09 RX ADMIN — DIGOXIN 500 MCG: 0.25 INJECTION INTRAMUSCULAR; INTRAVENOUS at 17:58

## 2022-10-09 RX ADMIN — GUAIFENESIN SYRUP AND DEXTROMETHORPHAN 5 ML: 100; 10 SYRUP ORAL at 21:38

## 2022-10-09 RX ADMIN — PIPERACILLIN SODIUM AND TAZOBACTAM SODIUM 3.38 G: 3; .375 INJECTION, POWDER, FOR SOLUTION INTRAVENOUS at 14:00

## 2022-10-09 RX ADMIN — ACYCLOVIR 400 MG: 400 TABLET ORAL at 05:54

## 2022-10-09 RX ADMIN — AMIODARONE HYDROCHLORIDE 400 MG: 200 TABLET ORAL at 18:13

## 2022-10-09 RX ADMIN — MAGNESIUM OXIDE TAB 400 MG (241.3 MG ELEMENTAL MG) 400 MG: 400 (241.3 MG) TAB at 04:15

## 2022-10-09 RX ADMIN — AMIODARONE HYDROCHLORIDE 400 MG: 200 TABLET ORAL at 04:15

## 2022-10-09 RX ADMIN — OXYMETAZOLINE HCL 2 SPRAY: 0.05 SPRAY NASAL at 18:16

## 2022-10-09 ASSESSMENT — ENCOUNTER SYMPTOMS
DOUBLE VISION: 0
VOMITING: 0
BLURRED VISION: 0
SPUTUM PRODUCTION: 1
HEADACHES: 0
NAUSEA: 0
BACK PAIN: 0
CHILLS: 0
DIZZINESS: 0
ORTHOPNEA: 0
ABDOMINAL PAIN: 0
FEVER: 0
LOSS OF CONSCIOUSNESS: 0
SHORTNESS OF BREATH: 1
DIARRHEA: 0
NERVOUS/ANXIOUS: 1
PALPITATIONS: 1
COUGH: 1

## 2022-10-09 ASSESSMENT — PAIN DESCRIPTION - PAIN TYPE: TYPE: ACUTE PAIN

## 2022-10-09 NOTE — PROGRESS NOTES
Hospital Medicine Daily Progress Note    Date of Service  10/9/2022    Chief Complaint  Ceferino Fitch is a 72 y.o. male admitted 9/21/2022 with SOB    Hospital Course  73yo PMHx HTN, NHL on immunotherapy ([place], obinutuzumab), gout, HLD, CKD, HTN.  + COVID 9/13.   TTE LVEF 20% inf/inf/lat hypokinesisi, severe AS.  Has been treated with decadron and Remdesivir on this admission    Interval Problem Update  Again feeling better today but anxious about getting up and working with therapy and staff  Bloody nose this am    In afternoon pt got up to chair and went into SVT 160s.  SBP 80s.  Converted on his own after a few minutes    DW wife at bedside    On mask at 6 L  Sinus 70-80s: one episode of SVT 1500 yesterday when getting up  [id number]  AFebrile  UOP 1400ml/24hrs on 40mg po lasix, neg 300ml/24hrs    I have discussed this patient's plan of care and discharge plan at IDT rounds today with Case Management, Nursing, Nursing leadership, and other members of the IDT team.    Consultants/Specialty  cardiology, infectious disease, and pulmonary    Code Status  Full Code    Disposition  Patient is not medically cleared for discharge.   Anticipate discharge to to home with close outpatient follow-up.  I have placed the appropriate orders for post-discharge needs.    Review of Systems  Review of Systems   Constitutional:  Negative for chills and fever.   HENT:  Positive for nosebleeds.    Eyes:  Negative for blurred vision and double vision.   Respiratory:  Positive for cough, sputum production and shortness of breath.    Cardiovascular:  Positive for palpitations. Negative for chest pain, orthopnea and leg swelling.   Gastrointestinal:  Negative for abdominal pain, diarrhea, nausea and vomiting.   Genitourinary:  Negative for dysuria and urgency.   Musculoskeletal:  Negative for back pain.   Skin:  Negative for rash.   Neurological:  Negative for dizziness, loss of consciousness and headaches.   Psychiatric/Behavioral:   The patient is nervous/anxious.       Physical Exam  Temp:  [35.9 °C (96.6 °F)-36.8 °C (98.2 °F)] 36.8 °C (98.2 °F)  Pulse:  [75-95] 82  Resp:  [16-18] 16  BP: ()/(52-69) 121/56  SpO2:  [90 %-99 %] 90 %    Physical Exam  Constitutional:       General: He is not in acute distress.     Appearance: Normal appearance. He is well-developed. He is not diaphoretic.   HENT:      Head: Normocephalic and atraumatic.      Nose: Congestion present.   Eyes:      General: No scleral icterus.        Right eye: No discharge.         Left eye: No discharge.      Conjunctiva/sclera: Conjunctivae normal.   Neck:      Vascular: No JVD.   Cardiovascular:      Rate and Rhythm: Regular rhythm. Tachycardia present.      Heart sounds: No murmur heard.    No gallop.   Pulmonary:      Effort: Pulmonary effort is normal. No respiratory distress.      Breath sounds: No stridor. Rhonchi present. No wheezing or rales.   Abdominal:      Palpations: Abdomen is soft.      Tenderness: There is no abdominal tenderness. There is no guarding or rebound.   Musculoskeletal:         General: No tenderness.      Right lower leg: No edema.      Left lower leg: No edema.   Skin:     General: Skin is warm and dry.      Capillary Refill: Capillary refill takes less than 2 seconds.      Findings: No rash.   Neurological:      Mental Status: He is alert and oriented to person, place, and time.   Psychiatric:         Mood and Affect: Mood normal.         Behavior: Behavior normal.         Thought Content: Thought content normal.       Fluids    Intake/Output Summary (Last 24 hours) at 10/9/2022 1041  Last data filed at 10/9/2022 1024  Gross per 24 hour   Intake 1022.5 ml   Output 1375 ml   Net -352.5 ml         Laboratory  Recent Labs     10/07/22  0415 10/08/22  0325 10/09/22  0657   WBC 1.7* 1.8* 2.2*   RBC 2.25* 2.18* 2.68*   HEMOGLOBIN 7.0* 6.8* 8.3*   HEMATOCRIT 21.0* 20.4* 24.0*   MCV 93.3 93.6 89.6   MCH 31.1 31.2 31.0   MCHC 33.3* 33.3* 34.6   RDW  49.0 48.7 47.1   PLATELETCT 56* 43* 45*   MPV 13.5* 13.1* 10.4       Recent Labs     10/07/22  1008 10/08/22  0325 10/09/22  0657   SODIUM 139 138 137   POTASSIUM 4.1 3.5* 3.5*   CHLORIDE 106 107 105   CO2 19* 20 19*   GLUCOSE 140* 87 77   BUN 35* 31* 26*   CREATININE 1.83* 1.80* 1.76*   CALCIUM 8.1* 8.2* 8.1*                     Imaging  DX-CHEST-PORTABLE (1 VIEW)   Final Result         1.  Pulmonary edema and/or infiltrates are identified, which are stable since the prior exam.   2.  Soft tissue gas in the chest wall and neck, somewhat increased since prior study.   3.  Atherosclerosis      DX-CHEST-PORTABLE (1 VIEW)   Final Result         1.  Pulmonary edema and/or infiltrates are identified, which are stable since the prior exam.   2.  Soft tissue gas in the chest wall and neck, increased on the left since prior study..   3.  Atherosclerosis      DX-CHEST-PORTABLE (1 VIEW)   Final Result         1.  Pulmonary edema and/or infiltrates are identified, which are stable since the prior exam.   2.  Right chest wall and neck soft tissue gas.   3.  Atherosclerosis      DX-CHEST-PORTABLE (1 VIEW)   Final Result      1.  No visible pneumothorax. This could be obscured by the patient's pneumomediastinum and soft tissue gas.   2.  Unchanged BILATERAL pulmonary opacities      DX-CHEST-PORTABLE (1 VIEW)   Final Result      1.  No visible pneumothorax. This could be obscured by the patient's pneumomediastinum and soft tissue gas.   2.  Unchanged BILATERAL pulmonary opacities      CT-CHEST (THORAX) W/O   Final Result      1.  Extensive pneumomediastinum with air within the soft tissues of the right neck and chest.   2.  Small right pneumothorax.   3.  Patchy extensive bilateral groundglass opacities with more dependent consolidative opacities are nonspecific, Covid Pneumonia versus other infectious/inflammatory etiology   4.  Sclerotic lesions in the right T11 and T12 levels in keeping with sclerotic osseous metastases   These  findings were discussed with PROSPER BOWMAN III on 10/1/2022 4:49 PM.      Fleischner Society pulmonary nodule recommendations:   Not Applicable         DX-CHEST-PORTABLE (1 VIEW)   Final Result      1.  There is a new small right apical pneumothorax with new right-sided subcutaneous air extending into the neck. Question of a small amount of mediastinal air.   2.  Interval increase in patchy lower lobe opacities which could be atelectasis or pneumonitis or edema.      3.  Findings were discussed with Eleno, the patient's nurse at 2:58 PM on 10/1/2022 on 10/1/2022 2:58 PM. He states he will give this message to the physician taking care of the patient.      DX-CHEST-PORTABLE (1 VIEW)   Final Result      1.  Right-sided [place] catheter tip projects in the distal right main pulmonary artery region. No pneumothorax.   2.  Mild interval worsening in hazy bilateral pulmonary opacities.         DX-CHEST-PORTABLE (1 VIEW)   Final Result      1.  Right-sided [place] catheter tip projects in the distal right main pulmonary artery region. No pneumothorax.   2.  Probable slight improvement in hazy bilateral pulmonary opacities.      DX-CHEST-PORTABLE (1 VIEW)   Final Result      1.  Right-sided PICC line unchanged in position.      2.  Increased interstitial markings throughout the lung fields especially in the perihilar regions consistent with pulmonary edema and/or pneumonitis.      CT-TAVR CHEST-ABD-PELVIS W/WO POST PROCESS   Final Result      1.  Limited by lack of intravenous contrast      2.  Aortic valve Agatston score 4565 and the annulus measures approximately 450 sq mm      3.  Coronary arteries originate over a centimeter above the annulus      4.  Severe atherosclerotic change with marked narrowing of the iliofemoral runoff, right worse than left      5.  Multifocal groundglass greater than bronchial thickening is compatible with known Covid pneumonia. There is also evidence of lower lung zone scarring,  possible interstitial lung disease and small pleural fluid      6.  Colonic diverticulosis, emphysema, left heart enlargement      DX-CHEST-PORTABLE (1 VIEW)   Final Result      Unchanged BILATERAL pneumonia      EC-ECHOCARDIOGRAM COMPLETE W/O CONT   Final Result      DX-CHEST-LIMITED (1 VIEW)   Final Result      1.  Multifocal pneumonia. Pulmonary edema could have a similar appearance.      US-RUQ   Final Result      Unremarkable RIGHT upper quadrant ultrasound             Assessment/Plan  * Acute respiratory failure with hypoxia (HCC)  Assessment & Plan  Respiratory failure in setting of covid -19 vs ? HF   Incentive spirometry  Currently he is on high flow oxygen to maintain oxygen saturation.   Pulm following  Has been treated for COVID with decadron and Remdesivir  ?secondary infectious process: ID consulted and following  Completed 7 days of Pip/Tazo per ID recommendations  Acyclovir is for prophylaxis  Has been on IV lasix with significant neg fluid balance.  Now on maintenance po lasix  Now off HFNC    SVT (supraventricular tachycardia) (HCC)  Assessment & Plan  Requiring cardioversion with adenosine 6mg   BPs are soft precluding BB or CCB  Concerned with an LVEF of 20% and severe AS he will not tolerate this dysrythmia well  Cards consulted  Having recurrent episodes with exertion  IV load 500mcg Dig and start po tomorrow, check level in 48hrs given CKD  Cont po amio load    Hypomagnesemia  Assessment & Plan  1.7 today: giving 2g IV MgSO4  Start po supplementation  Cont to follow    Secondary spontaneous pneumothorax  Assessment & Plan  He found to have a pneumothorax on chest x-ray October 02, 2022.  Pulmonary and infectious disease consulted.  Continue to monitor closely.  Daily chest x-ray.    Heart failure with reduced ejection fraction (HCC)  Assessment & Plan  Echo showed severely reduced ejection fraction   Cardiology is following him.  Monitor volume status; on IV lasix  Need to be cautious with  diuresis given AS  Afterload reduction on hydralazine  Initiate GDMT as pressures allow  Will reconsult Cards for Dobutamine stress test once off HFNC    Advance care planning  Assessment & Plan  Discussed by prior treatment team.  had a prolonged discussion with the patient regarding goals of care, diagnoses, prognosis, and CODE STATUS. We discussed his   prognosis and comorbidities. I spent  16  minutes on advanced care planning in addition to the time spent managing the other medical problems.    He requested to remain full code      Elevated liver enzymes  Assessment & Plan  Hepatitis panel came back negative.  right upper quadrant ultrasound did not show any acute abnormalities.  Could be secondary to remdesivir or congestive etiology  AST/ALT now in normal range    Elevated brain natriuretic peptide (BNP) level  Assessment & Plan  Likely in setting of COVID-19 infection  Euvolemic on exam  Echo showed reduced ejection fraction.    CKD (chronic kidney disease)  Assessment & Plan  Renal function has been fluctuating.  Increased BUN and creatinine this morning.  Continue to monitor  Renally dose medications as appropriate    Pancytopenia (HCC)  Assessment & Plan  In setting of immunotherapy  White blood cell count has been fluctuating.  Continue to monitor  Platelet count<50: stop heparin      Hypokalemia  Assessment & Plan  Follow daily  Replacing Mg      Low bicarbonate  Assessment & Plan  In setting of CKD  Continue to monitor.    COVID  Assessment & Plan    Activate sepsis protocol  COVID Isolation  He was placed on oxygen nasal cannula but he developed hypoxia again and he was placed on high flow nasal cannula.  Completed Decadron last dose October 1, 2022  He completed course of remdesivir.  Repeat COVID testing ordered by pulmonologist came back positive.  Monitor oxygen saturation closely      NSTEMI (non-ST elevated myocardial infarction) (HCC)- (present on admission)  Assessment & Plan    Echo showing  ef of 20% probable severer Aortic stenosis  Cardiology evaluated him and recommended to reconsult for possible stress echo when he is more stable.  No symptoms of acute chest pain.      Essential hypertension, benign- (present on admission)  Assessment & Plan  Mild hypotension.  Continue to monitor.    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    last immunotherapy infusion to treat his cancer in [place] on 09/09/2022  Follow-up with outpatient       VTE prophylaxis: enoxaparin ppx    I have performed a physical exam and reviewed and updated ROS and Plan today (10/9/2022). In review of yesterday's note (10/8/2022), there are no changes except as documented above.

## 2022-10-10 LAB
ANION GAP SERPL CALC-SCNC: 10 MMOL/L (ref 7–16)
BASOPHILS # BLD AUTO: 0 % (ref 0–1.8)
BASOPHILS # BLD: 0 K/UL (ref 0–0.12)
BUN SERPL-MCNC: 22 MG/DL (ref 8–22)
CALCIUM SERPL-MCNC: 8.3 MG/DL (ref 8.5–10.5)
CHLORIDE SERPL-SCNC: 107 MMOL/L (ref 96–112)
CO2 SERPL-SCNC: 18 MMOL/L (ref 20–33)
CREAT SERPL-MCNC: 1.64 MG/DL (ref 0.5–1.4)
EOSINOPHIL # BLD AUTO: 0.06 K/UL (ref 0–0.51)
EOSINOPHIL NFR BLD: 4.5 % (ref 0–6.9)
ERYTHROCYTE [DISTWIDTH] IN BLOOD BY AUTOMATED COUNT: 49.8 FL (ref 35.9–50)
GFR SERPLBLD CREATININE-BSD FMLA CKD-EPI: 44 ML/MIN/1.73 M 2
GLUCOSE SERPL-MCNC: 84 MG/DL (ref 65–99)
HCT VFR BLD AUTO: 24 % (ref 42–52)
HGB BLD-MCNC: 8.3 G/DL (ref 14–18)
IMM GRANULOCYTES # BLD AUTO: 0.02 K/UL (ref 0–0.11)
IMM GRANULOCYTES NFR BLD AUTO: 1.5 % (ref 0–0.9)
LYMPHOCYTES # BLD AUTO: 0.11 K/UL (ref 1–4.8)
LYMPHOCYTES NFR BLD: 8.2 % (ref 22–41)
MCH RBC QN AUTO: 32 PG (ref 27–33)
MCHC RBC AUTO-ENTMCNC: 34.6 G/DL (ref 33.7–35.3)
MCV RBC AUTO: 92.7 FL (ref 81.4–97.8)
MONOCYTES # BLD AUTO: 0.13 K/UL (ref 0–0.85)
MONOCYTES NFR BLD AUTO: 9.7 % (ref 0–13.4)
NEUTROPHILS # BLD AUTO: 1.02 K/UL (ref 1.82–7.42)
NEUTROPHILS NFR BLD: 76.1 % (ref 44–72)
NRBC # BLD AUTO: 0 K/UL
NRBC BLD-RTO: 0 /100 WBC
PLATELET # BLD AUTO: 54 K/UL (ref 164–446)
PMV BLD AUTO: 12.5 FL (ref 9–12.9)
POTASSIUM SERPL-SCNC: 4.3 MMOL/L (ref 3.6–5.5)
RBC # BLD AUTO: 2.59 M/UL (ref 4.7–6.1)
SODIUM SERPL-SCNC: 135 MMOL/L (ref 135–145)
WBC # BLD AUTO: 1.3 K/UL (ref 4.8–10.8)

## 2022-10-10 PROCEDURE — 700102 HCHG RX REV CODE 250 W/ 637 OVERRIDE(OP): Performed by: INTERNAL MEDICINE

## 2022-10-10 PROCEDURE — A9270 NON-COVERED ITEM OR SERVICE: HCPCS | Performed by: HOSPITALIST

## 2022-10-10 PROCEDURE — A9270 NON-COVERED ITEM OR SERVICE: HCPCS | Performed by: INTERNAL MEDICINE

## 2022-10-10 PROCEDURE — 85025 COMPLETE CBC W/AUTO DIFF WBC: CPT

## 2022-10-10 PROCEDURE — 770020 HCHG ROOM/CARE - TELE (206)

## 2022-10-10 PROCEDURE — 99232 SBSQ HOSP IP/OBS MODERATE 35: CPT | Performed by: HOSPITALIST

## 2022-10-10 PROCEDURE — 700102 HCHG RX REV CODE 250 W/ 637 OVERRIDE(OP): Performed by: NURSE PRACTITIONER

## 2022-10-10 PROCEDURE — 80048 BASIC METABOLIC PNL TOTAL CA: CPT

## 2022-10-10 PROCEDURE — A9270 NON-COVERED ITEM OR SERVICE: HCPCS | Performed by: NURSE PRACTITIONER

## 2022-10-10 PROCEDURE — 700102 HCHG RX REV CODE 250 W/ 637 OVERRIDE(OP): Performed by: HOSPITALIST

## 2022-10-10 RX ADMIN — HYDRALAZINE HYDROCHLORIDE 10 MG: 25 TABLET, FILM COATED ORAL at 23:03

## 2022-10-10 RX ADMIN — AMIODARONE HYDROCHLORIDE 400 MG: 200 TABLET ORAL at 17:27

## 2022-10-10 RX ADMIN — OXYMETAZOLINE HCL 2 SPRAY: 0.05 SPRAY NASAL at 17:27

## 2022-10-10 RX ADMIN — HYDRALAZINE HYDROCHLORIDE 10 MG: 25 TABLET, FILM COATED ORAL at 05:10

## 2022-10-10 RX ADMIN — HYDRALAZINE HYDROCHLORIDE 10 MG: 25 TABLET, FILM COATED ORAL at 13:46

## 2022-10-10 RX ADMIN — ALPRAZOLAM 0.25 MG: 0.25 TABLET ORAL at 17:26

## 2022-10-10 RX ADMIN — ACYCLOVIR 400 MG: 400 TABLET ORAL at 05:10

## 2022-10-10 RX ADMIN — POTASSIUM CHLORIDE 40 MEQ: 1500 TABLET, EXTENDED RELEASE ORAL at 05:10

## 2022-10-10 RX ADMIN — MAGNESIUM OXIDE TAB 400 MG (241.3 MG ELEMENTAL MG) 400 MG: 400 (241.3 MG) TAB at 05:10

## 2022-10-10 RX ADMIN — AMIODARONE HYDROCHLORIDE 400 MG: 200 TABLET ORAL at 05:10

## 2022-10-10 RX ADMIN — OXYMETAZOLINE HCL 2 SPRAY: 0.05 SPRAY NASAL at 05:10

## 2022-10-10 RX ADMIN — POTASSIUM CHLORIDE 40 MEQ: 1500 TABLET, EXTENDED RELEASE ORAL at 17:26

## 2022-10-10 RX ADMIN — DIGOXIN 62.5 MCG: 0.12 TABLET ORAL at 17:26

## 2022-10-10 ASSESSMENT — ENCOUNTER SYMPTOMS
NAUSEA: 0
NERVOUS/ANXIOUS: 1
ABDOMINAL PAIN: 0
COUGH: 1
CHILLS: 0
DOUBLE VISION: 0
BACK PAIN: 0
FEVER: 0
DIARRHEA: 0
ORTHOPNEA: 0
SPUTUM PRODUCTION: 1
BLURRED VISION: 0
SHORTNESS OF BREATH: 1
HEADACHES: 0
LOSS OF CONSCIOUSNESS: 0
PALPITATIONS: 1
DIZZINESS: 0
VOMITING: 0

## 2022-10-10 ASSESSMENT — FIBROSIS 4 INDEX: FIB4 SCORE: 4.59

## 2022-10-10 NOTE — DISCHARGE PLANNING
Case Management Discharge Planning    Admission Date: 9/21/2022  GMLOS: 5.4  ALOS: 19    6-Clicks ADL Score: 16  6-Clicks Mobility Score: 14  PT and/or OT Eval ordered: Yes  Post-acute Referrals Ordered: Yes  Post-acute Choice Obtained: Yes  Has referral(s) been sent to post-acute provider:  Yes      Anticipated Discharge Dispo: Discharge Disposition: D/T to Medicare cert LTCH w/planned hosp IP readmit (69)  Discharge Address: [address] [place] [address]  Discharge Contact Phone Number: [phone]    DME Needed: No    Action(s) Taken: Acceptance Received    Clinical Navigator Neisha stated pt is accepted at [facility] and they have a bed available.     Escalations Completed: None    Medically Clear: No    Next Steps: LSW will follow for any additional dc needs.     Barriers to Discharge: Medical clearance and Pending Placement    Is the patient up for discharge tomorrow: No

## 2022-10-10 NOTE — DISCHARGE PLANNING
Case Management Discharge Planning    Admission Date: 9/21/2022  GMLOS: 5.4  ALOS: 19    6-Clicks ADL Score: 16  6-Clicks Mobility Score: 14  PT and/or OT Eval ordered: Yes  Post-acute Referrals Ordered: Yes  Post-acute Choice Obtained: Yes  Has referral(s) been sent to post-acute provider:  Yes      Anticipated Discharge Dispo: Discharge Disposition: D/T to Medicare cert LTCH w/planned hosp IP readmit (13)  Discharge Address: [address] [place] [address]  Discharge Contact Phone Number: [phone]    DME Needed: No    Action(s) Taken: Referral(s) sent and Acceptance Received    Pt accepted at [facility], no bed availability at this time. MARI Vasquez called [facility] and was told they have no beds available at this time either.     MARI Vasquez will follow up with additional pending LTAC and SNFs.     Escalations Completed: None    Medically Clear: No    Next Steps: LSW will continue to follow up regarding placement and any additional dc needs.     Barriers to Discharge: Medical clearance and Pending Placement    Is the patient up for discharge tomorrow: No

## 2022-10-10 NOTE — PROGRESS NOTES
Hospital Medicine Daily Progress Note    Date of Service  10/10/2022    Chief Complaint  Ceferino Fitch is a 72 y.o. male admitted 9/21/2022 with SOB    Hospital Course  71yo PMHx HTN, NHL on immunotherapy ([place], obinutuzumab), gout, HLD, CKD, HTN.  + COVID 9/13.   TTE LVEF 20% inf/inf/lat hypokinesisi, severe AS.  Has been treated with decadron and Remdesivir on this admission    Interval Problem Update  Again feeling better today but anxious about getting up and working with therapy and staff  Epistaxis has resolved    In afternoon yesterdayd pt got up to chair and went into SVT 160s.  SBP 80s.  Converted on his own after a few minutes  Loaded with 500mcg IV Dig    DW wife at bedside    On mask at 6 L  Sinus 70-80s: one episode of SVT 1500 yesterday when getting up  [id number]  AFebrile  UOP 750ml/24hrs on 40mg po lasix, neg 630ml/24hrs    I have discussed this patient's plan of care and discharge plan at IDT rounds today with Case Management, Nursing, Nursing leadership, and other members of the IDT team.    Consultants/Specialty  cardiology, infectious disease, and pulmonary    Code Status  Full Code    Disposition  Patient is not medically cleared for discharge.   Anticipate discharge to to home with close outpatient follow-up.  I have placed the appropriate orders for post-discharge needs.    Review of Systems  Review of Systems   Constitutional:  Negative for chills and fever.   HENT:  Negative for nosebleeds.    Eyes:  Negative for blurred vision and double vision.   Respiratory:  Positive for cough, sputum production and shortness of breath.    Cardiovascular:  Positive for palpitations. Negative for chest pain, orthopnea and leg swelling.   Gastrointestinal:  Negative for abdominal pain, diarrhea, nausea and vomiting.   Genitourinary:  Negative for dysuria and urgency.   Musculoskeletal:  Negative for back pain.   Skin:  Negative for rash.   Neurological:  Negative for dizziness, loss of consciousness  and headaches.   Psychiatric/Behavioral:  The patient is nervous/anxious.       Physical Exam  Temp:  [36.8 °C (98.2 °F)-37 °C (98.6 °F)] 36.9 °C (98.5 °F)  Pulse:  [82-93] 93  Resp:  [16-18] 18  BP: (111-125)/(52-62) 121/55  SpO2:  [90 %-100 %] 96 %    Physical Exam  Constitutional:       General: He is not in acute distress.     Appearance: Normal appearance. He is well-developed. He is not diaphoretic.   HENT:      Head: Normocephalic and atraumatic.      Nose: Congestion present.   Eyes:      General: No scleral icterus.        Right eye: No discharge.         Left eye: No discharge.      Conjunctiva/sclera: Conjunctivae normal.   Neck:      Vascular: No JVD.   Cardiovascular:      Rate and Rhythm: Regular rhythm. Tachycardia present.      Heart sounds: No murmur heard.    No gallop.   Pulmonary:      Effort: Pulmonary effort is normal. No respiratory distress.      Breath sounds: No stridor. Rhonchi present. No wheezing or rales.   Abdominal:      Palpations: Abdomen is soft.      Tenderness: There is no abdominal tenderness. There is no guarding or rebound.   Musculoskeletal:         General: No tenderness.      Right lower leg: No edema.      Left lower leg: No edema.   Skin:     General: Skin is warm and dry.      Capillary Refill: Capillary refill takes less than 2 seconds.      Findings: No rash.   Neurological:      Mental Status: He is alert and oriented to person, place, and time.   Psychiatric:         Mood and Affect: Mood normal.         Behavior: Behavior normal.         Thought Content: Thought content normal.       Fluids    Intake/Output Summary (Last 24 hours) at 10/10/2022 0620  Last data filed at 10/10/2022 0000  Gross per 24 hour   Intake 120 ml   Output 600 ml   Net -480 ml         Laboratory  Recent Labs     10/08/22  0325 10/09/22  0657 10/10/22  0420   WBC 1.8* 2.2* 1.3*   RBC 2.18* 2.68* 2.59*   HEMOGLOBIN 6.8* 8.3* 8.3*   HEMATOCRIT 20.4* 24.0* 24.0*   MCV 93.6 89.6 92.7   MCH 31.2 31.0  32.0   MCHC 33.3* 34.6 34.6   RDW 48.7 47.1 49.8   PLATELETCT 43* 45* 54*   MPV 13.1* 10.4 12.5       Recent Labs     10/08/22  0325 10/09/22  0657 10/10/22  0420   SODIUM 138 137 135   POTASSIUM 3.5* 3.5* 4.3   CHLORIDE 107 105 107   CO2 20 19* 18*   GLUCOSE 87 77 84   BUN 31* 26* 22   CREATININE 1.80* 1.76* 1.64*   CALCIUM 8.2* 8.1* 8.3*                     Imaging  DX-CHEST-PORTABLE (1 VIEW)   Final Result         1.  Pulmonary edema and/or infiltrates are identified, which are stable since the prior exam.   2.  Soft tissue gas in the chest wall and neck, somewhat increased since prior study.   3.  Atherosclerosis      DX-CHEST-PORTABLE (1 VIEW)   Final Result         1.  Pulmonary edema and/or infiltrates are identified, which are stable since the prior exam.   2.  Soft tissue gas in the chest wall and neck, increased on the left since prior study..   3.  Atherosclerosis      DX-CHEST-PORTABLE (1 VIEW)   Final Result         1.  Pulmonary edema and/or infiltrates are identified, which are stable since the prior exam.   2.  Right chest wall and neck soft tissue gas.   3.  Atherosclerosis      DX-CHEST-PORTABLE (1 VIEW)   Final Result      1.  No visible pneumothorax. This could be obscured by the patient's pneumomediastinum and soft tissue gas.   2.  Unchanged BILATERAL pulmonary opacities      DX-CHEST-PORTABLE (1 VIEW)   Final Result      1.  No visible pneumothorax. This could be obscured by the patient's pneumomediastinum and soft tissue gas.   2.  Unchanged BILATERAL pulmonary opacities      CT-CHEST (THORAX) W/O   Final Result      1.  Extensive pneumomediastinum with air within the soft tissues of the right neck and chest.   2.  Small right pneumothorax.   3.  Patchy extensive bilateral groundglass opacities with more dependent consolidative opacities are nonspecific, Covid Pneumonia versus other infectious/inflammatory etiology   4.  Sclerotic lesions in the right T11 and T12 levels in keeping with  sclerotic osseous metastases   These findings were discussed with PROSPER BOWMAN III on 10/1/2022 4:49 PM.      Fleischner Society pulmonary nodule recommendations:   Not Applicable         DX-CHEST-PORTABLE (1 VIEW)   Final Result      1.  There is a new small right apical pneumothorax with new right-sided subcutaneous air extending into the neck. Question of a small amount of mediastinal air.   2.  Interval increase in patchy lower lobe opacities which could be atelectasis or pneumonitis or edema.      3.  Findings were discussed with Eleno, the patient's nurse at 2:58 PM on 10/1/2022 on 10/1/2022 2:58 PM. He states he will give this message to the physician taking care of the patient.      DX-CHEST-PORTABLE (1 VIEW)   Final Result      1.  Right-sided [place] catheter tip projects in the distal right main pulmonary artery region. No pneumothorax.   2.  Mild interval worsening in hazy bilateral pulmonary opacities.         DX-CHEST-PORTABLE (1 VIEW)   Final Result      1.  Right-sided [place] catheter tip projects in the distal right main pulmonary artery region. No pneumothorax.   2.  Probable slight improvement in hazy bilateral pulmonary opacities.      DX-CHEST-PORTABLE (1 VIEW)   Final Result      1.  Right-sided PICC line unchanged in position.      2.  Increased interstitial markings throughout the lung fields especially in the perihilar regions consistent with pulmonary edema and/or pneumonitis.      CT-TAVR CHEST-ABD-PELVIS W/WO POST PROCESS   Final Result      1.  Limited by lack of intravenous contrast      2.  Aortic valve Agatston score 4565 and the annulus measures approximately 450 sq mm      3.  Coronary arteries originate over a centimeter above the annulus      4.  Severe atherosclerotic change with marked narrowing of the iliofemoral runoff, right worse than left      5.  Multifocal groundglass greater than bronchial thickening is compatible with known Covid pneumonia. There is also  evidence of lower lung zone scarring, possible interstitial lung disease and small pleural fluid      6.  Colonic diverticulosis, emphysema, left heart enlargement      DX-CHEST-PORTABLE (1 VIEW)   Final Result      Unchanged BILATERAL pneumonia      EC-ECHOCARDIOGRAM COMPLETE W/O CONT   Final Result      DX-CHEST-LIMITED (1 VIEW)   Final Result      1.  Multifocal pneumonia. Pulmonary edema could have a similar appearance.      US-RUQ   Final Result      Unremarkable RIGHT upper quadrant ultrasound             Assessment/Plan  * Acute respiratory failure with hypoxia (HCC)  Assessment & Plan  Respiratory failure in setting of covid -19 vs ? HF   Incentive spirometry  Currently he is on high flow oxygen to maintain oxygen saturation.   Pulm following  Has been treated for COVID with decadron and Remdesivir  ?secondary infectious process: ID consulted and following  Completed 7 days of Pip/Tazo per ID recommendations  Acyclovir is for prophylaxis  Has been on IV lasix with significant neg fluid balance.  Now on maintenance po lasix  Now off HFNC    SVT (supraventricular tachycardia) ([facility])  Assessment & Plan  Requiring cardioversion with adenosine 6mg   BPs are soft precluding BB or CCB  Concerned with an LVEF of 20% and severe AS he will not tolerate this dysrythmia well  Cards consulted  Having recurrent episodes with exertion  IV load 500mcg Dig and started renally adjusted po, check level in tomorrow given CKD  Once Amio is completely loaded  Could consider trial off Dig as long term given renal function this would be a dificult medication  Cont po amio load    Hypomagnesemia  Assessment & Plan  1.7 today: giving 2g IV MgSO4  Start po supplementation  Cont to follow    Secondary spontaneous pneumothorax  Assessment & Plan  He found to have a pneumothorax on chest x-ray October 02, 2022.  Pulmonary and infectious disease consulted.  Continue to monitor closely.  Daily chest x-ray.    Heart failure with reduced  ejection fraction (HCC)  Assessment & Plan  Echo showed severely reduced ejection fraction   Cardiology is following him.  Monitor volume status; on IV lasix  Need to be cautious with diuresis given AS  Afterload reduction on hydralazine  Initiate GDMT as pressures allow  Will reconsult Cards for Dobutamine stress test once off HFNC    Advance care planning  Assessment & Plan  Discussed by prior treatment team.  had a prolonged discussion with the patient regarding goals of care, diagnoses, prognosis, and CODE STATUS. We discussed his   prognosis and comorbidities. I spent  16  minutes on advanced care planning in addition to the time spent managing the other medical problems.    He requested to remain full code      Elevated liver enzymes  Assessment & Plan  Hepatitis panel came back negative.  right upper quadrant ultrasound did not show any acute abnormalities.  Could be secondary to remdesivir or congestive etiology  AST/ALT now in normal range    Elevated brain natriuretic peptide (BNP) level  Assessment & Plan  Likely in setting of COVID-19 infection  Euvolemic on exam  Echo showed reduced ejection fraction.    CKD (chronic kidney disease)  Assessment & Plan  Renal function has been fluctuating.  Increased BUN and creatinine this morning.  Continue to monitor  Renally dose medications as appropriate    Pancytopenia (HCC)  Assessment & Plan  In setting of immunotherapy  White blood cell count has been fluctuating.  Continue to monitor  Platelet count hovering around 50 and has had epistaxis so off chemical DVT prophylaxis at present      Hypokalemia  Assessment & Plan  Follow daily  Replacing Mg      Low bicarbonate  Assessment & Plan  In setting of CKD  Continue to monitor.    COVID  Assessment & Plan    Activate sepsis protocol  COVID Isolation  He was placed on oxygen nasal cannula but he developed hypoxia again and he was placed on high flow nasal cannula.  Completed Decadron last dose October 1, 2022  He  completed course of remdesivir.  Repeat COVID testing ordered by pulmonologist came back positive.  Monitor oxygen saturation closely      NSTEMI (non-ST elevated myocardial infarction) (HCC)- (present on admission)  Assessment & Plan    Echo showing ef of 20% probable severer Aortic stenosis  Cardiology evaluated him and recommended to reconsult for possible stress echo when he is more stable.  No symptoms of acute chest pain.      Essential hypertension, benign- (present on admission)  Assessment & Plan  Mild hypotension.  Continue to monitor.    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    last immunotherapy infusion to treat his cancer in [place] on 09/09/2022  Follow-up with outpatient       VTE prophylaxis: enoxaparin ppx    I have performed a physical exam and reviewed and updated ROS and Plan today (10/10/2022). In review of yesterday's note (10/9/2022), there are no changes except as documented above.

## 2022-10-10 NOTE — PROGRESS NOTES
Handoff report received from night shift nurse. Pt care assumed. Pt is currently resting in bed. POC discussed with Pt and Pt verbalizes no questions at this time. Pt is AAOx4, on 10L oxy mask, on Tele monitoring, and VSS. Call light and belongings within reach, bed in lowest and locked position, and Pt educated on use of call light.

## 2022-10-10 NOTE — DISCHARGE PLANNING
Agency/Facility Name: [facility]  Spoke To: Cassi  Outcome: DPA called to check on referral status. DPA was notified they are not taking new Pts as there is no bed availability    Agency[facility]Facility Name: [facility]  Outcome: DPA left vmail requesting referral status     Agency/Facility Name: [facility]  Outcome: DPA left vmail requesting referral status     Agency/Facility Name: [facility]  Outcome: DPA left vmail requesting referral status    Agency[facility]Facility Name: [facility]   Outcome: DPA left vmail requesting referral status       [id number]    Agency/Facility Name: [facility]  Spoke To: Carline  Outcome: DPA received call from [facility] requesting referral sent again. DPA sent referral     Agency/Facility Name: [facility]  Spoke To: Yumiko  Outcome: DPA received call from [facility] stating they will leave Pt at pending until Pt is on under 5 liters of O2

## 2022-10-10 NOTE — CARE PLAN
The patient is Watcher - Medium risk of patient condition declining or worsening    Shift Goals  Clinical Goals: Stable O2  Patient Goals: Rest/Cough Less  Family Goals: NA    Progress made toward(s) clinical / shift goals:  Patient remained hemodynamically stable throughout shift. Received medication to assist with cough.        Problem: Knowledge Deficit - Standard  Goal: Patient and family/care givers will demonstrate understanding of plan of care, disease process/condition, diagnostic tests and medications  Outcome: Progressing     Problem: Hemodynamics  Goal: Patient's hemodynamics, fluid balance and neurologic status will be stable or improve  Outcome: Progressing     Problem: Fall Risk  Goal: Patient will remain free from falls  Outcome: Progressing

## 2022-10-11 ENCOUNTER — APPOINTMENT (OUTPATIENT)
Dept: RADIOLOGY | Facility: MEDICAL CENTER | Age: 72
DRG: 177 | End: 2022-10-11
Attending: HOSPITALIST
Payer: MEDICARE

## 2022-10-11 LAB
BASOPHILS # BLD AUTO: 0 % (ref 0–1.8)
BASOPHILS # BLD: 0 K/UL (ref 0–0.12)
CRP SERPL HS-MCNC: 19.83 MG/DL (ref 0–0.75)
D DIMER PPP IA.FEU-MCNC: 1.76 UG/ML (FEU) (ref 0–0.5)
DIGOXIN SERPL-MCNC: 1.4 NG/ML (ref 0.8–2)
EOSINOPHIL # BLD AUTO: 0.07 K/UL (ref 0–0.51)
EOSINOPHIL NFR BLD: 4.7 % (ref 0–6.9)
ERYTHROCYTE [DISTWIDTH] IN BLOOD BY AUTOMATED COUNT: 50.2 FL (ref 35.9–50)
HCT VFR BLD AUTO: 25.1 % (ref 42–52)
HGB BLD-MCNC: 8.5 G/DL (ref 14–18)
IMM GRANULOCYTES # BLD AUTO: 0.02 K/UL (ref 0–0.11)
IMM GRANULOCYTES NFR BLD AUTO: 1.3 % (ref 0–0.9)
LYMPHOCYTES # BLD AUTO: 0.16 K/UL (ref 1–4.8)
LYMPHOCYTES NFR BLD: 10.7 % (ref 22–41)
MCH RBC QN AUTO: 31.4 PG (ref 27–33)
MCHC RBC AUTO-ENTMCNC: 33.9 G/DL (ref 33.7–35.3)
MCV RBC AUTO: 92.6 FL (ref 81.4–97.8)
MONOCYTES # BLD AUTO: 0.15 K/UL (ref 0–0.85)
MONOCYTES NFR BLD AUTO: 10 % (ref 0–13.4)
NEUTROPHILS # BLD AUTO: 1.1 K/UL (ref 1.82–7.42)
NEUTROPHILS NFR BLD: 73.3 % (ref 44–72)
NRBC # BLD AUTO: 0 K/UL
NRBC BLD-RTO: 0 /100 WBC
NT-PROBNP SERPL IA-MCNC: 5374 PG/ML (ref 0–125)
PLATELET # BLD AUTO: 75 K/UL (ref 164–446)
PMV BLD AUTO: 11.8 FL (ref 9–12.9)
RBC # BLD AUTO: 2.71 M/UL (ref 4.7–6.1)
WBC # BLD AUTO: 1.5 K/UL (ref 4.8–10.8)

## 2022-10-11 PROCEDURE — A9270 NON-COVERED ITEM OR SERVICE: HCPCS | Performed by: INTERNAL MEDICINE

## 2022-10-11 PROCEDURE — 71045 X-RAY EXAM CHEST 1 VIEW: CPT

## 2022-10-11 PROCEDURE — 700102 HCHG RX REV CODE 250 W/ 637 OVERRIDE(OP): Performed by: INTERNAL MEDICINE

## 2022-10-11 PROCEDURE — A9270 NON-COVERED ITEM OR SERVICE: HCPCS | Performed by: NURSE PRACTITIONER

## 2022-10-11 PROCEDURE — 85379 FIBRIN DEGRADATION QUANT: CPT

## 2022-10-11 PROCEDURE — A9270 NON-COVERED ITEM OR SERVICE: HCPCS | Performed by: HOSPITALIST

## 2022-10-11 PROCEDURE — 770020 HCHG ROOM/CARE - TELE (206)

## 2022-10-11 PROCEDURE — 80162 ASSAY OF DIGOXIN TOTAL: CPT

## 2022-10-11 PROCEDURE — 99233 SBSQ HOSP IP/OBS HIGH 50: CPT | Performed by: HOSPITALIST

## 2022-10-11 PROCEDURE — 700102 HCHG RX REV CODE 250 W/ 637 OVERRIDE(OP): Performed by: HOSPITALIST

## 2022-10-11 PROCEDURE — 86140 C-REACTIVE PROTEIN: CPT

## 2022-10-11 PROCEDURE — 94760 N-INVAS EAR/PLS OXIMETRY 1: CPT

## 2022-10-11 PROCEDURE — 83880 ASSAY OF NATRIURETIC PEPTIDE: CPT

## 2022-10-11 PROCEDURE — 94640 AIRWAY INHALATION TREATMENT: CPT

## 2022-10-11 PROCEDURE — 700102 HCHG RX REV CODE 250 W/ 637 OVERRIDE(OP): Performed by: NURSE PRACTITIONER

## 2022-10-11 PROCEDURE — 85025 COMPLETE CBC W/AUTO DIFF WBC: CPT

## 2022-10-11 PROCEDURE — 700111 HCHG RX REV CODE 636 W/ 250 OVERRIDE (IP): Performed by: HOSPITALIST

## 2022-10-11 RX ORDER — HEPARIN SODIUM 5000 [USP'U]/ML
5000 INJECTION, SOLUTION INTRAVENOUS; SUBCUTANEOUS EVERY 8 HOURS
Status: DISCONTINUED | OUTPATIENT
Start: 2022-10-11 | End: 2022-10-12 | Stop reason: HOSPADM

## 2022-10-11 RX ORDER — DEXAMETHASONE SODIUM PHOSPHATE 4 MG/ML
6 INJECTION, SOLUTION INTRA-ARTICULAR; INTRALESIONAL; INTRAMUSCULAR; INTRAVENOUS; SOFT TISSUE DAILY
Status: DISCONTINUED | OUTPATIENT
Start: 2022-10-11 | End: 2022-10-12 | Stop reason: HOSPADM

## 2022-10-11 RX ORDER — BUDESONIDE AND FORMOTEROL FUMARATE DIHYDRATE 160; 4.5 UG/1; UG/1
2 AEROSOL RESPIRATORY (INHALATION)
Status: DISCONTINUED | OUTPATIENT
Start: 2022-10-11 | End: 2022-10-12 | Stop reason: HOSPADM

## 2022-10-11 RX ADMIN — HEPARIN SODIUM 5000 UNITS: 5000 INJECTION, SOLUTION INTRAVENOUS; SUBCUTANEOUS at 21:15

## 2022-10-11 RX ADMIN — HYDRALAZINE HYDROCHLORIDE 10 MG: 25 TABLET, FILM COATED ORAL at 06:26

## 2022-10-11 RX ADMIN — HEPARIN SODIUM 5000 UNITS: 5000 INJECTION, SOLUTION INTRAVENOUS; SUBCUTANEOUS at 14:08

## 2022-10-11 RX ADMIN — OXYMETAZOLINE HCL 2 SPRAY: 0.05 SPRAY NASAL at 06:30

## 2022-10-11 RX ADMIN — GUAIFENESIN SYRUP AND DEXTROMETHORPHAN 5 ML: 100; 10 SYRUP ORAL at 23:58

## 2022-10-11 RX ADMIN — AMIODARONE HYDROCHLORIDE 400 MG: 200 TABLET ORAL at 06:27

## 2022-10-11 RX ADMIN — ALPRAZOLAM 0.25 MG: 0.25 TABLET ORAL at 23:58

## 2022-10-11 RX ADMIN — ACYCLOVIR 400 MG: 400 TABLET ORAL at 06:27

## 2022-10-11 RX ADMIN — HYDRALAZINE HYDROCHLORIDE 10 MG: 25 TABLET, FILM COATED ORAL at 14:08

## 2022-10-11 RX ADMIN — POTASSIUM CHLORIDE 40 MEQ: 1500 TABLET, EXTENDED RELEASE ORAL at 18:59

## 2022-10-11 RX ADMIN — AMIODARONE HYDROCHLORIDE 400 MG: 200 TABLET ORAL at 18:59

## 2022-10-11 RX ADMIN — HYDRALAZINE HYDROCHLORIDE 10 MG: 25 TABLET, FILM COATED ORAL at 21:14

## 2022-10-11 RX ADMIN — DEXAMETHASONE SODIUM PHOSPHATE 6 MG: 4 INJECTION, SOLUTION INTRA-ARTICULAR; INTRALESIONAL; INTRAMUSCULAR; INTRAVENOUS; SOFT TISSUE at 10:36

## 2022-10-11 RX ADMIN — POTASSIUM CHLORIDE 40 MEQ: 1500 TABLET, EXTENDED RELEASE ORAL at 06:26

## 2022-10-11 RX ADMIN — MAGNESIUM OXIDE TAB 400 MG (241.3 MG ELEMENTAL MG) 400 MG: 400 (241.3 MG) TAB at 06:26

## 2022-10-11 ASSESSMENT — ENCOUNTER SYMPTOMS
BLURRED VISION: 0
VOMITING: 0
SPUTUM PRODUCTION: 1
BACK PAIN: 0
DOUBLE VISION: 0
PALPITATIONS: 1
HEADACHES: 0
ABDOMINAL PAIN: 0
NAUSEA: 0
ORTHOPNEA: 0
LOSS OF CONSCIOUSNESS: 0
SHORTNESS OF BREATH: 1
CHILLS: 0
DIZZINESS: 0
NERVOUS/ANXIOUS: 1
FEVER: 0
COUGH: 1
DIARRHEA: 0

## 2022-10-11 ASSESSMENT — PAIN DESCRIPTION - PAIN TYPE: TYPE: ACUTE PAIN

## 2022-10-11 ASSESSMENT — FIBROSIS 4 INDEX: FIB4 SCORE: 3.3

## 2022-10-11 NOTE — CARE PLAN
The patient is Watcher - Medium risk of patient condition declining or worsening    Shift Goals  Clinical Goals: pt will maintain SpO2 >92% ON.  Patient Goals: rest, breath easy  Family Goals: NA    Progress made toward(s) clinical / shift goals:  Patient not sustaining adequate oxygen saturations on 10L simple mask, now on 30L 40% FiO2.    Patient is not progressing towards the following goals:      Problem: Respiratory  Goal: Patient will achieve/maintain optimum respiratory ventilation and gas exchange  Outcome: Not Progressing

## 2022-10-11 NOTE — PROGRESS NOTES
Notified MD Kunz that patient was not sustaining sats, placed patient on HHFNC. Patient went from 10L of oxygen via simple mask to 50L 90% FiO2. Orders received for labs, imaging and medications.    @1530: patient titrated down to 30L 50%FiO2

## 2022-10-11 NOTE — CARE PLAN
The patient is Watcher - Medium risk of patient condition declining or worsening    Shift Goals  Clinical Goals: pt will maintain SpO2 >92% ON.  Patient Goals: rest, breath easy  Family Goals: NA    Progress made toward(s) clinical / shift goals:  SpO2 maintaining on 10L oxymask.   Problem: Knowledge Deficit - Standard  Goal: Patient and family/care givers will demonstrate understanding of plan of care, disease process/condition, diagnostic tests and medications  Outcome: Progressing     Problem: Hemodynamics  Goal: Patient's hemodynamics, fluid balance and neurologic status will be stable or improve  Outcome: Progressing     Problem: Fluid Volume  Goal: Fluid volume balance will be maintained  Outcome: Progressing     Problem: Infection - Standard  Goal: Patient will remain free from infection  Outcome: Progressing     Problem: Fall Risk  Goal: Patient will remain free from falls  Outcome: Progressing       Patient is not progressing towards the following goals: Pt is still not able to actively participate in ADLs for both lack of strength and anxiety surrounding arrythmias experienced with activity.      Problem: Self Care  Goal: Patient will have the ability to perform ADLs independently or with assistance (bathe, groom, dress, toilet and feed)  Outcome: Not Met

## 2022-10-11 NOTE — PROGRESS NOTES
Hospital Medicine Daily Progress Note    Date of Service  10/11/2022    Chief Complaint  Ceferino Fitch is a 72 y.o. male admitted 9/21/2022 with SOB    Hospital Course  71yo PMHx HTN, NHL on immunotherapy ([place], obinutuzumab), gout, HLD, CKD, HTN.  + COVID 9/13.   TTE LVEF 20% inf/inf/lat hypokinesisi, severe AS.  Has been treated with decadron and Remdesivir on this admission    Interval Problem Update  10/11: Patient was found to be decompensating with acute hypoxia.  He now requires high flow oxygen.  Have started Decadron and Symbicort.  Chest x-ray found improved infiltrates bilaterally    I have discussed this patient's plan of care and discharge plan at IDT rounds today with Case Management, Nursing, Nursing leadership, and other members of the IDT team.    Consultants/Specialty  cardiology, infectious disease, and pulmonary    Code Status  Full Code    Disposition  Patient is not medically cleared for discharge.   Anticipate discharge to to home with close outpatient follow-up.  I have placed the appropriate orders for post-discharge needs.    Review of Systems  Review of Systems   Constitutional:  Negative for chills and fever.   HENT:  Negative for nosebleeds.    Eyes:  Negative for blurred vision and double vision.   Respiratory:  Positive for cough, sputum production and shortness of breath.    Cardiovascular:  Positive for palpitations. Negative for chest pain, orthopnea and leg swelling.   Gastrointestinal:  Negative for abdominal pain, diarrhea, nausea and vomiting.   Genitourinary:  Negative for dysuria and urgency.   Musculoskeletal:  Negative for back pain.   Skin:  Negative for rash.   Neurological:  Negative for dizziness, loss of consciousness and headaches.   Psychiatric/Behavioral:  The patient is nervous/anxious.       Physical Exam  Temp:  [36.6 °C (97.9 °F)-37.2 °C (99 °F)] 36.8 °C (98.3 °F)  Pulse:  [72-99] 84  Resp:  [16-24] 20  BP: (117-142)/(55-77) 117/55  SpO2:  [78 %-98 %] 96  %    Physical Exam  Constitutional:       General: He is not in acute distress.     Appearance: Normal appearance. He is well-developed. He is not diaphoretic.   HENT:      Head: Normocephalic and atraumatic.      Nose: Congestion present.   Eyes:      General: No scleral icterus.        Right eye: No discharge.         Left eye: No discharge.      Conjunctiva/sclera: Conjunctivae normal.   Neck:      Vascular: No JVD.   Cardiovascular:      Rate and Rhythm: Regular rhythm. Tachycardia present.      Heart sounds: No murmur heard.    No gallop.   Pulmonary:      Effort: Pulmonary effort is normal. No respiratory distress.      Breath sounds: No stridor. Rhonchi present. No wheezing or rales.   Abdominal:      Palpations: Abdomen is soft.      Tenderness: There is no abdominal tenderness. There is no guarding or rebound.   Musculoskeletal:         General: No tenderness.      Right lower leg: No edema.      Left lower leg: No edema.   Skin:     General: Skin is warm and dry.      Capillary Refill: Capillary refill takes less than 2 seconds.      Findings: No rash.   Neurological:      Mental Status: He is alert and oriented to person, place, and time.   Psychiatric:         Mood and Affect: Mood normal.         Behavior: Behavior normal.         Thought Content: Thought content normal.       Fluids    Intake/Output Summary (Last 24 hours) at 10/11/2022 1549  Last data filed at 10/11/2022 1008  Gross per 24 hour   Intake 480 ml   Output 300 ml   Net 180 ml       Laboratory  Recent Labs     10/09/22  0657 10/10/22  0420 10/11/22  0402   WBC 2.2* 1.3* 1.5*   RBC 2.68* 2.59* 2.71*   HEMOGLOBIN 8.3* 8.3* 8.5*   HEMATOCRIT 24.0* 24.0* 25.1*   MCV 89.6 92.7 92.6   MCH 31.0 32.0 31.4   MCHC 34.6 34.6 33.9   RDW 47.1 49.8 50.2*   PLATELETCT 45* 54* 75*   MPV 10.4 12.5 11.8     Recent Labs     10/09/22  0657 10/10/22  0420   SODIUM 137 135   POTASSIUM 3.5* 4.3   CHLORIDE 105 107   CO2 19* 18*   GLUCOSE 77 84   BUN 26* 22    CREATININE 1.76* 1.64*   CALCIUM 8.1* 8.3*                   Imaging  DX-CHEST-LIMITED (1 VIEW)   Final Result      1.  Stable patchy bilateral airspace opacities.   2.  Decreased soft tissue emphysema.      DX-CHEST-PORTABLE (1 VIEW)   Final Result         1.  Pulmonary edema and/or infiltrates are identified, which are stable since the prior exam.   2.  Soft tissue gas in the chest wall and neck, somewhat increased since prior study.   3.  Atherosclerosis      DX-CHEST-PORTABLE (1 VIEW)   Final Result         1.  Pulmonary edema and/or infiltrates are identified, which are stable since the prior exam.   2.  Soft tissue gas in the chest wall and neck, increased on the left since prior study..   3.  Atherosclerosis      DX-CHEST-PORTABLE (1 VIEW)   Final Result         1.  Pulmonary edema and/or infiltrates are identified, which are stable since the prior exam.   2.  Right chest wall and neck soft tissue gas.   3.  Atherosclerosis      DX-CHEST-PORTABLE (1 VIEW)   Final Result      1.  No visible pneumothorax. This could be obscured by the patient's pneumomediastinum and soft tissue gas.   2.  Unchanged BILATERAL pulmonary opacities      DX-CHEST-PORTABLE (1 VIEW)   Final Result      1.  No visible pneumothorax. This could be obscured by the patient's pneumomediastinum and soft tissue gas.   2.  Unchanged BILATERAL pulmonary opacities      CT-CHEST (THORAX) W/O   Final Result      1.  Extensive pneumomediastinum with air within the soft tissues of the right neck and chest.   2.  Small right pneumothorax.   3.  Patchy extensive bilateral groundglass opacities with more dependent consolidative opacities are nonspecific, Covid Pneumonia versus other infectious/inflammatory etiology   4.  Sclerotic lesions in the right T11 and T12 levels in keeping with sclerotic osseous metastases   These findings were discussed with PROSPER BOWMAN III on 10/1/2022 4:49 PM.      Fleischner Society pulmonary nodule recommendations:    Not Applicable         DX-CHEST-PORTABLE (1 VIEW)   Final Result      1.  There is a new small right apical pneumothorax with new right-sided subcutaneous air extending into the neck. Question of a small amount of mediastinal air.   2.  Interval increase in patchy lower lobe opacities which could be atelectasis or pneumonitis or edema.      3.  Findings were discussed with Eleno, the patient's nurse at 2:58 PM on 10/1/2022 on 10/1/2022 2:58 PM. He states he will give this message to the physician taking care of the patient.      DX-CHEST-PORTABLE (1 VIEW)   Final Result      1.  Right-sided [place]-Frannie catheter tip projects in the distal right main pulmonary artery region. No pneumothorax.   2.  Mild interval worsening in hazy bilateral pulmonary opacities.         DX-CHEST-PORTABLE (1 VIEW)   Final Result      1.  Right-sided [place]-Frannie catheter tip projects in the distal right main pulmonary artery region. No pneumothorax.   2.  Probable slight improvement in hazy bilateral pulmonary opacities.      DX-CHEST-PORTABLE (1 VIEW)   Final Result      1.  Right-sided PICC line unchanged in position.      2.  Increased interstitial markings throughout the lung fields especially in the perihilar regions consistent with pulmonary edema and/or pneumonitis.      CT-TAVR CHEST-ABD-PELVIS W/WO POST PROCESS   Final Result      1.  Limited by lack of intravenous contrast      2.  Aortic valve Agatston score 4565 and the annulus measures approximately 450 sq mm      3.  Coronary arteries originate over a centimeter above the annulus      4.  Severe atherosclerotic change with marked narrowing of the iliofemoral runoff, right worse than left      5.  Multifocal groundglass greater than bronchial thickening is compatible with known Covid pneumonia. There is also evidence of lower lung zone scarring, possible interstitial lung disease and small pleural fluid      6.  Colonic diverticulosis, emphysema, left heart enlargement       DX-CHEST-PORTABLE (1 VIEW)   Final Result      Unchanged BILATERAL pneumonia      EC-ECHOCARDIOGRAM COMPLETE W/O CONT   Final Result      DX-CHEST-LIMITED (1 VIEW)   Final Result      1.  Multifocal pneumonia. Pulmonary edema could have a similar appearance.      US-RUQ   Final Result      Unremarkable RIGHT upper quadrant ultrasound             Assessment/Plan  * Acute respiratory failure with hypoxia (HCC)  Assessment & Plan  Respiratory failure in setting of covid -19 vs ? HF   Incentive spirometry  Currently he is on high flow oxygen to maintain oxygen saturation.   Pulm following  Has been treated for COVID with decadron and Remdesivir  ?secondary infectious process: ID consulted and following  Completed 7 days of Pip/Tazo per ID recommendations  Acyclovir is for prophylaxis    SVT (supraventricular tachycardia) (HCC)  Assessment & Plan  Requiring cardioversion with adenosine 6mg   BPs are soft precluding BB or CCB  Concerned with an LVEF of 20% and severe AS he will not tolerate this dysrythmia well  Cards consulted  Having recurrent episodes with exertion  IV load 500mcg Dig and started renally adjusted po, check level in tomorrow given CKD  Once Amio is completely loaded  Could consider trial off Dig as long term given renal function this would be a dificult medication  Cont po amio load    Hypomagnesemia  Assessment & Plan  1.7 today: giving 2g IV MgSO4  Start po supplementation  Cont to follow    Secondary spontaneous pneumothorax  Assessment & Plan  He found to have a pneumothorax on chest x-ray October 02, 2022.  Pulmonary and infectious disease consulted.  Continue to monitor closely.  Daily chest x-ray.    Heart failure with reduced ejection fraction (HCC)  Assessment & Plan  Echo showed severely reduced ejection fraction   Cardiology is following him.  Monitor volume status; on IV lasix  Need to be cautious with diuresis given AS  Afterload reduction on hydralazine  Initiate GDMT as pressures  allow  Will reconsult Cards for Dobutamine stress test once off HFNC    Advance care planning  Assessment & Plan  Discussed by prior treatment team.  had a prolonged discussion with the patient regarding goals of care, diagnoses, prognosis, and CODE STATUS. We discussed his   prognosis and comorbidities. I spent  16  minutes on advanced care planning in addition to the time spent managing the other medical problems.    He requested to remain full code      Elevated liver enzymes  Assessment & Plan  Hepatitis panel came back negative.  right upper quadrant ultrasound did not show any acute abnormalities.  Could be secondary to remdesivir or congestive etiology  AST/ALT now in normal range    Elevated brain natriuretic peptide (BNP) level  Assessment & Plan  Likely in setting of COVID-19 infection  Euvolemic on exam  Echo showed reduced ejection fraction.    CKD (chronic kidney disease)  Assessment & Plan  Renal function has been fluctuating.  Increased BUN and creatinine this morning.  Continue to monitor  Renally dose medications as appropriate    Pancytopenia (HCC)  Assessment & Plan  In setting of immunotherapy  White blood cell count has been fluctuating.  Continue to monitor  Platelet count hovering around 50 and has had epistaxis so off chemical DVT prophylaxis at present      Hypokalemia  Assessment & Plan  Follow daily  Replacing Mg      Low bicarbonate  Assessment & Plan  In setting of CKD  Continue to monitor.    COVID  Assessment & Plan    Activate sepsis protocol  COVID Isolation  He was placed on oxygen nasal cannula but he developed hypoxia again and he was placed on high flow nasal cannula.  Completed Decadron last dose October 1, 2022  He completed course of remdesivir.  Repeat COVID testing ordered by pulmonologist came back positive.  Monitor oxygen saturation closely      NSTEMI (non-ST elevated myocardial infarction) ([facility])- (present on admission)  Assessment & Plan    Echo showing ef of 20%  probable severer Aortic stenosis  Cardiology evaluated him and recommended to reconsult for possible stress echo when he is more stable.  No symptoms of acute chest pain.      Essential hypertension, benign- (present on admission)  Assessment & Plan  Mild hypotension.  Continue to monitor.    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    last immunotherapy infusion to treat his cancer in [place] on 09/09/2022  Follow-up with outpatient       VTE prophylaxis: enoxaparin ppx    I have performed a physical exam and reviewed and updated ROS and Plan today (10/11/2022). In review of yesterday's note (10/10/2022), there are no changes except as documented above.

## 2022-10-11 NOTE — PROGRESS NOTES
Dina Cee from Lab called with critical result of WBC 1.5 at 0434. Critical lab result read back to Abram Lema RN.   Bhavya Garrett NP notified of critical lab result at 0438.  Critical lab result read back by Bhavya Garrett NP.

## 2022-10-12 ENCOUNTER — PHARMACY VISIT (OUTPATIENT)
Dept: PHARMACY | Facility: MEDICAL CENTER | Age: 72
End: 2022-10-12
Payer: COMMERCIAL

## 2022-10-12 VITALS
HEART RATE: 76 BPM | HEIGHT: 71 IN | RESPIRATION RATE: 18 BRPM | WEIGHT: 125.88 LBS | OXYGEN SATURATION: 95 % | DIASTOLIC BLOOD PRESSURE: 50 MMHG | TEMPERATURE: 97.9 F | SYSTOLIC BLOOD PRESSURE: 115 MMHG | BODY MASS INDEX: 17.62 KG/M2

## 2022-10-12 LAB
ALBUMIN SERPL BCP-MCNC: 2.8 G/DL (ref 3.2–4.9)
ALBUMIN/GLOB SERPL: 1.2 G/DL
ALP SERPL-CCNC: 116 U/L (ref 30–99)
ALT SERPL-CCNC: 26 U/L (ref 2–50)
ANION GAP SERPL CALC-SCNC: 13 MMOL/L (ref 7–16)
AST SERPL-CCNC: 21 U/L (ref 12–45)
BASOPHILS # BLD AUTO: 0.5 % (ref 0–1.8)
BASOPHILS # BLD AUTO: 0.7 % (ref 0–1.8)
BASOPHILS # BLD: 0.01 K/UL (ref 0–0.12)
BASOPHILS # BLD: 0.01 K/UL (ref 0–0.12)
BILIRUB SERPL-MCNC: 0.3 MG/DL (ref 0.1–1.5)
BUN SERPL-MCNC: 25 MG/DL (ref 8–22)
CALCIUM SERPL-MCNC: 8.5 MG/DL (ref 8.5–10.5)
CHLORIDE SERPL-SCNC: 104 MMOL/L (ref 96–112)
CO2 SERPL-SCNC: 15 MMOL/L (ref 20–33)
CREAT SERPL-MCNC: 1.42 MG/DL (ref 0.5–1.4)
EOSINOPHIL # BLD AUTO: 0 K/UL (ref 0–0.51)
EOSINOPHIL # BLD AUTO: 0 K/UL (ref 0–0.51)
EOSINOPHIL NFR BLD: 0 % (ref 0–6.9)
EOSINOPHIL NFR BLD: 0 % (ref 0–6.9)
ERYTHROCYTE [DISTWIDTH] IN BLOOD BY AUTOMATED COUNT: 48.1 FL (ref 35.9–50)
ERYTHROCYTE [DISTWIDTH] IN BLOOD BY AUTOMATED COUNT: 49.1 FL (ref 35.9–50)
GFR SERPLBLD CREATININE-BSD FMLA CKD-EPI: 52 ML/MIN/1.73 M 2
GLOBULIN SER CALC-MCNC: 2.4 G/DL (ref 1.9–3.5)
GLUCOSE SERPL-MCNC: 110 MG/DL (ref 65–99)
HCT VFR BLD AUTO: 23.1 % (ref 42–52)
HCT VFR BLD AUTO: 23.4 % (ref 42–52)
HGB BLD-MCNC: 7.9 G/DL (ref 14–18)
HGB BLD-MCNC: 8.1 G/DL (ref 14–18)
IMM GRANULOCYTES # BLD AUTO: 0.02 K/UL (ref 0–0.11)
IMM GRANULOCYTES # BLD AUTO: 0.03 K/UL (ref 0–0.11)
IMM GRANULOCYTES NFR BLD AUTO: 1.5 % (ref 0–0.9)
IMM GRANULOCYTES NFR BLD AUTO: 1.6 % (ref 0–0.9)
LYMPHOCYTES # BLD AUTO: 0.05 K/UL (ref 1–4.8)
LYMPHOCYTES # BLD AUTO: 0.1 K/UL (ref 1–4.8)
LYMPHOCYTES NFR BLD: 2.7 % (ref 22–41)
LYMPHOCYTES NFR BLD: 7.3 % (ref 22–41)
MCH RBC QN AUTO: 31 PG (ref 27–33)
MCH RBC QN AUTO: 31.4 PG (ref 27–33)
MCHC RBC AUTO-ENTMCNC: 34.2 G/DL (ref 33.7–35.3)
MCHC RBC AUTO-ENTMCNC: 34.6 G/DL (ref 33.7–35.3)
MCV RBC AUTO: 90.6 FL (ref 81.4–97.8)
MCV RBC AUTO: 90.7 FL (ref 81.4–97.8)
MONOCYTES # BLD AUTO: 0.12 K/UL (ref 0–0.85)
MONOCYTES # BLD AUTO: 0.18 K/UL (ref 0–0.85)
MONOCYTES NFR BLD AUTO: 13.1 % (ref 0–13.4)
MONOCYTES NFR BLD AUTO: 6.5 % (ref 0–13.4)
NEUTROPHILS # BLD AUTO: 1.06 K/UL (ref 1.82–7.42)
NEUTROPHILS # BLD AUTO: 1.65 K/UL (ref 1.82–7.42)
NEUTROPHILS NFR BLD: 77.4 % (ref 44–72)
NEUTROPHILS NFR BLD: 88.7 % (ref 44–72)
NRBC # BLD AUTO: 0 K/UL
NRBC # BLD AUTO: 0 K/UL
NRBC BLD-RTO: 0 /100 WBC
NRBC BLD-RTO: 0 /100 WBC
PLATELET # BLD AUTO: 82 K/UL (ref 164–446)
PLATELET # BLD AUTO: 92 K/UL (ref 164–446)
PMV BLD AUTO: 11.7 FL (ref 9–12.9)
PMV BLD AUTO: 11.7 FL (ref 9–12.9)
POTASSIUM SERPL-SCNC: 6.4 MMOL/L (ref 3.6–5.5)
PROT SERPL-MCNC: 5.2 G/DL (ref 6–8.2)
RBC # BLD AUTO: 2.55 M/UL (ref 4.7–6.1)
RBC # BLD AUTO: 2.58 M/UL (ref 4.7–6.1)
SODIUM SERPL-SCNC: 132 MMOL/L (ref 135–145)
WBC # BLD AUTO: 1.4 K/UL (ref 4.8–10.8)
WBC # BLD AUTO: 1.9 K/UL (ref 4.8–10.8)

## 2022-10-12 PROCEDURE — 700111 HCHG RX REV CODE 636 W/ 250 OVERRIDE (IP): Performed by: HOSPITALIST

## 2022-10-12 PROCEDURE — A9270 NON-COVERED ITEM OR SERVICE: HCPCS | Performed by: INTERNAL MEDICINE

## 2022-10-12 PROCEDURE — 94640 AIRWAY INHALATION TREATMENT: CPT

## 2022-10-12 PROCEDURE — 90662 IIV NO PRSV INCREASED AG IM: CPT | Performed by: HOSPITALIST

## 2022-10-12 PROCEDURE — 700102 HCHG RX REV CODE 250 W/ 637 OVERRIDE(OP): Performed by: HOSPITALIST

## 2022-10-12 PROCEDURE — 700102 HCHG RX REV CODE 250 W/ 637 OVERRIDE(OP): Performed by: INTERNAL MEDICINE

## 2022-10-12 PROCEDURE — RXMED WILLOW AMBULATORY MEDICATION CHARGE: Performed by: HOSPITALIST

## 2022-10-12 PROCEDURE — A9270 NON-COVERED ITEM OR SERVICE: HCPCS | Performed by: HOSPITALIST

## 2022-10-12 PROCEDURE — 3E02340 INTRODUCTION OF INFLUENZA VACCINE INTO MUSCLE, PERCUTANEOUS APPROACH: ICD-10-PCS | Performed by: HOSPITALIST

## 2022-10-12 PROCEDURE — A9270 NON-COVERED ITEM OR SERVICE: HCPCS | Performed by: NURSE PRACTITIONER

## 2022-10-12 PROCEDURE — A9270 NON-COVERED ITEM OR SERVICE: HCPCS | Performed by: STUDENT IN AN ORGANIZED HEALTH CARE EDUCATION/TRAINING PROGRAM

## 2022-10-12 PROCEDURE — 700102 HCHG RX REV CODE 250 W/ 637 OVERRIDE(OP): Performed by: NURSE PRACTITIONER

## 2022-10-12 PROCEDURE — 700102 HCHG RX REV CODE 250 W/ 637 OVERRIDE(OP): Performed by: STUDENT IN AN ORGANIZED HEALTH CARE EDUCATION/TRAINING PROGRAM

## 2022-10-12 PROCEDURE — 99239 HOSP IP/OBS DSCHRG MGMT >30: CPT | Performed by: HOSPITALIST

## 2022-10-12 PROCEDURE — 90471 IMMUNIZATION ADMIN: CPT

## 2022-10-12 PROCEDURE — 80053 COMPREHEN METABOLIC PANEL: CPT

## 2022-10-12 PROCEDURE — 85025 COMPLETE CBC W/AUTO DIFF WBC: CPT

## 2022-10-12 RX ORDER — DEXAMETHASONE SODIUM PHOSPHATE 4 MG/ML
6 INJECTION, SOLUTION INTRA-ARTICULAR; INTRALESIONAL; INTRAMUSCULAR; INTRAVENOUS; SOFT TISSUE DAILY
Refills: 0 | Status: SHIPPED
Start: 2022-10-13 | End: 2022-10-22

## 2022-10-12 RX ORDER — GUAIFENESIN/DEXTROMETHORPHAN 100-10MG/5
5 SYRUP ORAL EVERY 6 HOURS PRN
Qty: 840 ML | Status: SHIPPED
Start: 2022-10-12

## 2022-10-12 RX ORDER — HYDRALAZINE HYDROCHLORIDE 10 MG/1
10 TABLET, FILM COATED ORAL EVERY 8 HOURS
Qty: 90 TABLET | Status: SHIPPED
Start: 2022-10-12

## 2022-10-12 RX ORDER — AMIODARONE HYDROCHLORIDE 200 MG/1
200 TABLET ORAL DAILY
Qty: 30 TABLET | Status: SHIPPED
Start: 2022-10-22

## 2022-10-12 RX ORDER — AMIODARONE HYDROCHLORIDE 400 MG/1
400 TABLET ORAL 2 TIMES DAILY
Qty: 30 TABLET | Status: SHIPPED
Start: 2022-10-12

## 2022-10-12 RX ADMIN — MAGNESIUM OXIDE TAB 400 MG (241.3 MG ELEMENTAL MG) 400 MG: 400 (241.3 MG) TAB at 04:44

## 2022-10-12 RX ADMIN — ALPRAZOLAM 0.25 MG: 0.25 TABLET ORAL at 08:08

## 2022-10-12 RX ADMIN — ALPRAZOLAM 0.25 MG: 0.25 TABLET ORAL at 14:33

## 2022-10-12 RX ADMIN — BUDESONIDE AND FORMOTEROL FUMARATE DIHYDRATE 2 PUFF: 160; 4.5 AEROSOL RESPIRATORY (INHALATION) at 08:22

## 2022-10-12 RX ADMIN — SENNOSIDES AND DOCUSATE SODIUM 2 TABLET: 50; 8.6 TABLET ORAL at 04:44

## 2022-10-12 RX ADMIN — POTASSIUM CHLORIDE 40 MEQ: 1500 TABLET, EXTENDED RELEASE ORAL at 04:44

## 2022-10-12 RX ADMIN — INFLUENZA A VIRUS A/VICTORIA/2570/2019 IVR-215 (H1N1) ANTIGEN (FORMALDEHYDE INACTIVATED), INFLUENZA A VIRUS A/DARWIN/9/2021 SAN-010 (H3N2) ANTIGEN (FORMALDEHYDE INACTIVATED), INFLUENZA B VIRUS B/PHUKET/3073/2013 ANTIGEN (FORMALDEHYDE INACTIVATED), AND INFLUENZA B VIRUS B/MICHIGAN/01/2021 ANTIGEN (FORMALDEHYDE INACTIVATED) 0.7 ML: 60; 60; 60; 60 INJECTION, SUSPENSION INTRAMUSCULAR at 14:31

## 2022-10-12 RX ADMIN — HYDRALAZINE HYDROCHLORIDE 10 MG: 25 TABLET, FILM COATED ORAL at 04:44

## 2022-10-12 RX ADMIN — HEPARIN SODIUM 5000 UNITS: 5000 INJECTION, SOLUTION INTRAVENOUS; SUBCUTANEOUS at 04:44

## 2022-10-12 RX ADMIN — AMIODARONE HYDROCHLORIDE 400 MG: 200 TABLET ORAL at 04:44

## 2022-10-12 RX ADMIN — DEXAMETHASONE SODIUM PHOSPHATE 6 MG: 4 INJECTION, SOLUTION INTRA-ARTICULAR; INTRALESIONAL; INTRAMUSCULAR; INTRAVENOUS; SOFT TISSUE at 04:44

## 2022-10-12 RX ADMIN — HYDRALAZINE HYDROCHLORIDE 10 MG: 25 TABLET, FILM COATED ORAL at 14:29

## 2022-10-12 RX ADMIN — ACYCLOVIR 400 MG: 400 TABLET ORAL at 04:47

## 2022-10-12 RX ADMIN — HEPARIN SODIUM 5000 UNITS: 5000 INJECTION, SOLUTION INTRAVENOUS; SUBCUTANEOUS at 14:29

## 2022-10-12 ASSESSMENT — PAIN DESCRIPTION - PAIN TYPE
TYPE: ACUTE PAIN
TYPE: ACUTE PAIN

## 2022-10-12 NOTE — HEART FAILURE PROGRAM
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  EF 20%.    Discharge Order Quality Check-Up - HFrEF    Patient is discharging to LTAC today.    HF Education Documented? no messaged bedside RNSocorro    Where we stand right now with HFrEF MEASURES per review of most recent notes and discharge med rec:    Evidence Based Beta Blocker (bisoprolol, carvedilol, or toprol xl), for EF of 40% or less: messaged Dr. Kunz asking that he consider prescribing this med or documenting why not prescribing  JESSICA - I, for EF of 40% or less ARNI is preferred If not cost prohibitive for patient: messaged Dr. Kunz asking that he consider prescribing this med or documenting why not prescribing  SGLT2 inhibitor with proven ASCVD, HF, or DKD benefit (Jardiance/empagliflozin): If not cost prohibitive for patient   Aldosterone antagonist, for EF of 40% or less: messaged Dr. Kunz asking that he consider prescribing this med or documenting why not prescribing  Anticoagulation for atrial arrhythmia: not diagnosed  Glycemic control for DM + HF: DM not diagnosed  Lipid lowering medication for DM + HF: DM not diagnosed  Hydralazine Hydrochloride/Isosorbide Dinitrate if indicated per facesheet: no  Pneumococcal vaccine: previous  Influenza vaccine for current season: is on the MAR under PRN's messaged MARCOS Quintanilla asking that the vaccine be offered to the patient and 'administered' as given or refused.  Nicotine cessation counseling documented: not actively using per h/p  Device screening: n/a new reduced EF  Valve team alert: yes, severe AS  Referral to disease management program specializing in heart failure care see appointment  Referral to Cardiac Rehab to be addressed in outpatient setting      Thank you, Adelia, Cardio RN Navigator [phone]

## 2022-10-12 NOTE — DISCHARGE PLANNING
Case Management Discharge Planning    Admission Date: 9/21/2022  GMLOS: 5.2  ALOS: 21    6-Clicks ADL Score: 16  6-Clicks Mobility Score: 14  PT and/or OT Eval ordered: Yes  Post-acute Referrals Ordered: Yes  Post-acute Choice Obtained: Yes  Has referral(s) been sent to post-acute provider:  Yes      Anticipated Discharge Dispo: Discharge Disposition: D/T to Medicare cert LTCH w/planned hosp IP readmit (72)  Discharge Address: [address] [place] [address]  Discharge Contact Phone Number: [phone]    DME Needed: No    Action(s) Taken: [facility] LT has a bed for the patient today, transport with [facility] is set up at 1100.  RN CM updated the patient and his wife Jenniffer at bedside, patient consented to transfer.  Transfer packet completed and given to [facility] .    Escalations Completed: Ride Line    Medically Clear: Yes    Next Steps: Care coordination available to discuss any discharge barriers.    Barriers to Discharge: None    Is the patient up for discharge tomorrow: Discharging today    @1115- [facility] sent the ALS crew.  However, the critical care crew is need to transport a patient on high flow O2.  [facility] ride was rescheduled for 1430.  Patient and family updated at bedside.  RN CM updated [facility] liaison Carline.

## 2022-10-12 NOTE — CARE PLAN
The patient is Watcher - Medium risk of patient condition declining or worsening    Shift Goals  Clinical Goals: monitor resp status, rest  Patient Goals: rest  Family Goals: n/a    Progress made toward(s) clinical / shift goals:      Problem: Knowledge Deficit - Standard  Goal: Patient and family/care givers will demonstrate understanding of plan of care, disease process/condition, diagnostic tests and medications  Outcome: Progressing     Problem: Hemodynamics  Goal: Patient's hemodynamics, fluid balance and neurologic status will be stable or improve  Outcome: Progressing     Problem: Infection - Standard  Goal: Patient will remain free from infection  Outcome: Progressing     Problem: Fall Risk  Goal: Patient will remain free from falls  Outcome: Progressing     Problem: Psychosocial  Goal: Patient's level of anxiety will decrease  Outcome: Progressing       Patient is not progressing towards the following goals:

## 2022-10-12 NOTE — CARE PLAN
Problem: Humidified High Flow Nasal Cannula  Goal: Maintain adequate oxygenation dependent on patient condition  Description: Target End Date:  resolve prior to discharge or when underlying condition is resolved/stabilized    1.  Implement humidified high flow oxygen therapy  2.  Titrate high flow oxygen to maintain appropriate SpO2  Outcome: Not Progressing  Flowsheets  Taken 10/12/2022 1353  O2 (LPM): 50  FiO2%: 70 %  Taken 10/11/2022 0900  Indication: All other patients: SpO2 less than 90% despite conventional supplemental oxygen devices  Outcome: Normoxia       Respiratory Update    Treatment modality / Frequency    HHFNC  / Continuous     Pt tolerating current treatments well with no adverse reactions.

## 2022-10-12 NOTE — DISCHARGE SUMMARY
Discharge Summary    CHIEF COMPLAINT ON ADMISSION  No chief complaint on file.      Reason for Admission  elevated troponin     Admission Date  9/21/2022    CODE STATUS  Full Code    HPI & HOSPITAL COURSE  This is a 72 y.o. male here with past medical history of follicular lymphoma on immunotherapy comes into the hospital for COVID-pneumonia.  Associated symptoms include fatigue, loss of appetite, nausea, confusion, unsteady gait and decreased p.o. intake.  The patient has been fully vaccinated against COVID-19.  He came to the hospital because he was feeling short of breath.  His last immunotherapy was 2 weeks ago prior to admission.  Lab work found pancytopenia, sodium 130, potassium 3.3 and acute on chronic renal failure.  His troponin down trended from 6.6-5.4.  Cardiac echo found EF to be 20% with severe aortic stenosis.  Hypokinesis of the inferior and inferior lateral segments.  Mild AI.  Cardiology was consulted since this was new onset of heart failure and may be due to myocarditis.  Patient was started on IV Lasix.  She was placed on Decadron and remdesivir which he completed a course of.  Patient's creatinine improved after starting IV Lasix and he was started on metolazone.  Patient underwent a right heart catheterization on 9/25.  Cardiac output was 4 L/min, cardiac index was 2.1 L/min, CVP pressures 1mmHg, pulmonary arterial mean pressure 15 mmHg, capillary wedge pressure 6 mmHg.  Patient's diuretics were discontinued.  Patient respiratory status continued to deteriorate and he was placed on high flow oxygen.  Patient underwent a narrow complex tachycardia.  He was started on IV amiodarone and then transition to oral.  Patient is respiratory status did improve to 10 L of oxygen.  Patient's respiratory status continued to deteriorate and his CRP levels were in 20s.  He was placed back on IV Decadron.  Patient be transferred to LTAC.      Therefore, he is discharged in good and stable condition [facility].    The patient met 2-midnight criteria for an inpatient stay at the time of discharge.    Discharge Date  10/12/2022    FOLLOW UP ITEMS POST DISCHARGE  Follow up with [facility]    DISCHARGE DIAGNOSES  Principal Problem:    Acute respiratory failure with hypoxia (HCC) POA: Unknown  Active Problems:    Follicular lymphoma grade I (HCC) POA: Yes    Essential hypertension, benign POA: Yes      Overview: Last Assessment & Plan:       Formatting of this note might be different from the original.      Known white-coat syndrome, BP at home 120's/80's    NSTEMI (non-ST elevated myocardial infarction) (HCC) POA: Yes    COVID POA: Unknown    Low bicarbonate POA: Unknown    Hypokalemia POA: Unknown    Pancytopenia (HCC) POA: Unknown    CKD (chronic kidney disease) POA: Unknown    Elevated brain natriuretic peptide (BNP) level POA: Unknown    Elevated liver enzymes POA: Unknown    Advance care planning POA: Unknown    Heart failure with reduced ejection fraction (HCC) POA: Unknown    Secondary spontaneous pneumothorax POA: Unknown    Hemoptysis POA: Unknown    Hypomagnesemia POA: Unknown      Overview: 1.7      Giving 2 IV MgSO4      Repeat lab tomorrow    SVT (supraventricular tachycardia) (HCC) POA: Unknown  Resolved Problems:    * No resolved hospital problems. *      FOLLOW UP  Future Appointments   Date Time Provider Department Center   10/31/2022 11:00 AM MANISH Bal [facility] None   11/2/2022  3:20 PM Vic Escobar M.D. [facility] None     No follow-up provider specified.    MEDICATIONS ON DISCHARGE     Medication List        START taking these medications        Instructions   * amiodarone 400 MG tablet  Commonly known as: PACERONE   Take 1 Tablet by mouth 2 times a day.  Dose: 400 mg     * amiodarone 200 MG Tabs  Start taking on: October 22, 2022  Commonly known as: Cordarone   Take 1 Tablet by mouth every day.  Dose: 200 mg     dexamethasone 4 MG/ML Soln  Start taking on: October 13,  2022  Commonly known as: DECADRON   Infuse 1.5 mL into a venous catheter every day for 9 days.  Dose: 6 mg     guaiFENesin dextromethorphan 100-10 MG/5ML Syrp syrup  Commonly known as: ROBITUSSIN DM   Take 5 mL by mouth every 6 hours as needed for Cough.  Dose: 5 mL     hydrALAZINE 10 MG Tabs  Commonly known as: APRESOLINE   Take 1 Tablet by mouth every 8 hours.  Dose: 10 mg           * This list has 2 medication(s) that are the same as other medications prescribed for you. Read the directions carefully, and ask your doctor or other care provider to review them with you.                CONTINUE taking these medications        Instructions   acetaminophen 325 MG Tabs  Commonly known as: Tylenol   Take 325 mg by mouth one time as needed.  Dose: 325 mg     acyclovir 400 MG tablet  Commonly known as: Zovirax   Take 400 mg by mouth every day.  Dose: 400 mg     aspirin 81 MG EC tablet   Take 81 mg by mouth every day.  Dose: 81 mg     Cyanocobalamin 2000 MCG Tabs   Take 1 Tablet by mouth every day.  Dose: 1 Tablet     rosuvastatin 20 MG Tabs  Commonly known as: CRESTOR   Take 20 mg by mouth every day.  Dose: 20 mg     vitamin D 1000 UNIT Tabs  Commonly known as: VITAMIND D3   Take 2,000 Units by mouth every day.  Dose: 2,000 Units            STOP taking these medications      allopurinol 100 MG Tabs  Commonly known as: ZYLOPRIM              Allergies  Allergies   Allergen Reactions    Penicillins Rash and Unspecified     Hoarseness  Tolerated zosyn 10/2022       DIET  Orders Placed This Encounter   Procedures    Diet Order Diet: Regular     Standing Status:   Standing     Number of Occurrences:   1     Order Specific Question:   Diet:     Answer:   Regular [1]       ACTIVITY  As tolerated.  Weight bearing as tolerated    CONSULTATIONS  Cardiology    PROCEDURES  Right heart cath 9/25    LABORATORY  Lab Results   Component Value Date    SODIUM 135 10/10/2022    POTASSIUM 4.3 10/10/2022    CHLORIDE 107 10/10/2022    CO2 18  (L) 10/10/2022    GLUCOSE 84 10/10/2022    BUN 22 10/10/2022    CREATININE 1.64 (H) 10/10/2022        Lab Results   Component Value Date    WBC 1.4 (LL) 10/12/2022    HEMOGLOBIN 7.9 (L) 10/12/2022    HEMATOCRIT 23.1 (L) 10/12/2022    PLATELETCT 82 (L) 10/12/2022        Total time of the discharge process exceeds 50 minutes.

## 2022-10-12 NOTE — DISCHARGE PLANNING
DC Transport Scheduled    Received request at: 10/12/2022 0930    Transport Company Scheduled:  [facility]  Spoke with Haley at [facility] to schedule transport.    Scheduled Date: 10/12/2022  Scheduled Time: 1430    Destination: [facility]  [address] [facility] [address] [place]    Notified care team of scheduled transport via [employer].     If there are any changes needed to the DC transportation scheduled, please contact [facility] at ext. [phone] between the hours of [phone] Mon-Fri. If outside those hours, contact the ED Case Manager at ext. [phone].

## 2022-10-12 NOTE — DIETARY
Nutrition Services Brief Update:    Day 21 of admit.  Ceferino Fitch is a 72 y.o. male with admitting DX of NSTEMI (non-ST elevated myocardial infarction)     Current Diet: Regular with supplements. Pt continues with variable and overall poor intake with most intake of meals and Boosts 0/<25%. Of note pt is on HFNC, suspect impacting intake. RD continues to encourage intake of all meals and supplements.     Problem: Nutritional:  Goal: Achieve adequate nutritional intake  Description: Patient will consume >50% of meals and supplements   Outcome: Not progressing       RD following

## 2022-10-12 NOTE — DISCHARGE INSTRUCTIONS
HF Patient Discharge Instructions  Monitor your weight daily, and maintain a weight chart, to track your weight changes.   Activity as tolerated, unless your Doctor has ordered otherwise.   Follow a low fat, low cholesterol, low salt diet unless instructed otherwise by your Doctor. Read the labels on the back of food products and track your intake of fat, cholesterol and salt.   Fluid Restriction No. If a Fluid Restriction has been ordered by your Doctor, measure fluids with a measuring cup to ensure that you are not exceeding the restriction.   No smoking.  Oxygen Yes. If your Doctor has ordered that you wear Oxygen at home, it is important to wear it as ordered.  Did you receive an explanation from staff on the importance of taking each of your medications and why it is necessary to keep taking them unless your doctor says to stop? Yes  Were all of your questions answered about how to manage your heart failure and what to do if you have increased signs and symptoms after you go home? Yes  Do you feel like your heart failure care team involved you in the care treatment plan and allowed you to make decisions regarding your care while in the hospital and addressed any discharge needs you might have? Yes    See the educational handout provided at discharge for more information on monitoring your daily weight, activity and diet. This also explains more about Heart Failure, symptoms of a flare-up and some of the tests that you have undergone.     Warning Signs of a Flare-Up include:  Swelling in the ankles or lower legs.  Shortness of breath, while at rest, or while doing normal activities.   Shortness of breath at night when in bed, or coughing in bed.   Requiring more pillows to sleep at night, or needing to sit up at night to sleep.  Feeling weak, dizzy or fatigued.     When to call your Doctor:  Call [facility] seven days a week from 8:00 a.m. to 8:00 p.m. for medical questions [phone].  Call  your Primary Care Physician or Cardiologist if:   You experience any pain radiating to your jaw or neck.  You have any difficulty breathing.  You experience weight gain of 3 lbs in a day or 5 lbs in a week.   You feel any palpitations or irregular heartbeats.  You become dizzy or lose consciousness.   If you have had an angiogram or had a pacemaker or AICD placed, and experience:  Bleeding, drainage or swelling at the surgical / puncture site.  Fever greater than 100.0 F  Shock from internal defibrillator.  Cool and / or numb extremities.  Diagnosis:  Acute Coronary Syndrome (ACS) is a diagnosis that encompasses cardiac-related chest pain and heart attack. ACS occurs when the blood flow to the heart muscle is severely reduced or cut off completely due to a slow process called atherosclerosis.  Atherosclerosis is a disease in which the coronary arteries become narrow from a buildup of fat, cholesterol, and other substances that combine to form plaque. If the plaque breaks, a blood clot will form and block the blood flow to the heart muscle. This lack of blood flow can cause damage or death to the heart muscle which is called a heart attack or Myocardial Infarction (MI). There are two different types of MIs:  ST Elevation Myocardial Infarction or STEMI (the most severe type of heart attack) and Non-ST Elevation Myocardial Infarction or NSTEMI.    Treatment Plan:  Cardiac Diet  - Low fat, low salt, low cholesterol   Cardiac Rehab  - Your doctor has ordered you a referral to [facility].  Call [phone] to schedule an appointment.  Attend my follow-up appointment with my Cardiologist.  Take my medications as prescribed by my doctor  Exercise daily  Lower my bad cholesterol and raise my good cholesterol, lower my blood pressure, and Reduce stress    Medications:  Certain medications are used to treat ACS.  Remember to always take medications as prescribed and never stop talking medications unless told by your  doctor.    You have been prescribed the following medicatons:    Aspirin - Aspirin is used as a blood thinning medication and you will require this medication indefinitely.  Statin - Statin rosuvastatin is used to lower cholesterol.

## 2022-10-12 NOTE — DISCHARGE PLANNING
Agency/Facility Name: [facility]  Spoke To: Carline  Outcome: Facility has bed available for Pt today around [id number] preference. DPA to call back with time confirmation

## 2022-10-18 NOTE — TELEPHONE ENCOUNTER
LVM for patient to call back and confirm.    Called [facility] to confirm appt and transportatioin and was advised from Sia the patient passed away 10/14/2022. Updated chart and cancelled upcoming appts.

## 2022-11-01 LAB
FUNGUS SPEC CULT: NORMAL
FUNGUS SPEC FUNGUS STN: NORMAL
GRAM STN SPEC: NORMAL
SIGNIFICANT IND 70042: NORMAL
SITE SITE: NORMAL
SOURCE SOURCE: NORMAL

## 2022-11-02 ENCOUNTER — APPOINTMENT (OUTPATIENT)
Dept: CARDIOLOGY | Facility: MEDICAL CENTER | Age: 72
End: 2022-11-02
Payer: MEDICARE

## 2022-11-16 LAB
MYCOBACTERIUM SPEC CULT: NORMAL
RHODAMINE-AURAMINE STN SPEC: NORMAL
SIGNIFICANT IND 70042: NORMAL
SITE SITE: NORMAL
SOURCE SOURCE: NORMAL